# Patient Record
Sex: FEMALE | Race: WHITE | NOT HISPANIC OR LATINO | Employment: UNEMPLOYED | URBAN - METROPOLITAN AREA
[De-identification: names, ages, dates, MRNs, and addresses within clinical notes are randomized per-mention and may not be internally consistent; named-entity substitution may affect disease eponyms.]

---

## 2017-07-12 ENCOUNTER — TELEPHONE (OUTPATIENT)
Dept: FAMILY MEDICINE | Facility: CLINIC | Age: 42
End: 2017-07-12

## 2017-07-12 NOTE — LETTER
July 12, 2017      Elly Ballard  [address] [place] [address]    Dear Ballard,      This letter is to remind you that you are due for your PAP smear.    Please call [phone] to schedule your appointment at your earliest convenience.     If you have completed the tests outside of [facility], please have the results forwarded to our office so we can update our records. You could also send us this info via EasyLink, we typically need the month and year it was done, clinic name and result.     Sincerely,      Care team for HERMINIO Bhatt CNP

## 2017-07-12 NOTE — TELEPHONE ENCOUNTER
Panel Management Review      Patient has the following on her problem list: None      Composite cancer screening  Chart review shows that this patient is due/due soon for the following Pap Smear  Summary:    Patient is due/failing the following:   PAP    Action needed:   Patient needs office visit for pap.    Type of outreach:    Sent letter.    Questions for provider review:    None                                                                                                                                    [facility]         Chart routed to none.

## 2017-08-05 ENCOUNTER — HEALTH MAINTENANCE LETTER (OUTPATIENT)
Age: 42
End: 2017-08-05

## 2018-04-19 ENCOUNTER — OFFICE VISIT (OUTPATIENT)
Dept: FAMILY MEDICINE | Facility: CLINIC | Age: 43
End: 2018-04-19
Payer: COMMERCIAL

## 2018-04-19 VITALS
HEART RATE: 79 BPM | BODY MASS INDEX: 19.62 KG/M2 | SYSTOLIC BLOOD PRESSURE: 115 MMHG | TEMPERATURE: 97.7 F | HEIGHT: 67 IN | WEIGHT: 125 LBS | OXYGEN SATURATION: 99 % | DIASTOLIC BLOOD PRESSURE: 74 MMHG

## 2018-04-19 DIAGNOSIS — Z13.220 LIPID SCREENING: ICD-10-CM

## 2018-04-19 DIAGNOSIS — E55.9 VITAMIN D DEFICIENCY: ICD-10-CM

## 2018-04-19 DIAGNOSIS — F43.9 SITUATIONAL STRESS: Primary | ICD-10-CM

## 2018-04-19 LAB
CHOLEST SERPL-MCNC: 160 MG/DL
ERYTHROCYTE [DISTWIDTH] IN BLOOD BY AUTOMATED COUNT: 12.4 % (ref 10–15)
HCT VFR BLD AUTO: 39.1 % (ref 35–47)
HDLC SERPL-MCNC: 60 MG/DL
HGB BLD-MCNC: 13.1 G/DL (ref 11.7–15.7)
IRON SATN MFR SERPL: 28 % (ref 15–46)
IRON SERPL-MCNC: 87 UG/DL (ref 35–180)
LDLC SERPL CALC-MCNC: 88 MG/DL
MCH RBC QN AUTO: 30.5 PG (ref 26.5–33)
MCHC RBC AUTO-ENTMCNC: 33.5 G/DL (ref 31.5–36.5)
MCV RBC AUTO: 91 FL (ref 78–100)
NONHDLC SERPL-MCNC: 100 MG/DL
PLATELET # BLD AUTO: 206 10E9/L (ref 150–450)
RBC # BLD AUTO: 4.3 10E12/L (ref 3.8–5.2)
TIBC SERPL-MCNC: 315 UG/DL (ref 240–430)
TRIGL SERPL-MCNC: 61 MG/DL
TSH SERPL DL<=0.005 MIU/L-ACNC: 3.38 MU/L (ref 0.4–4)
WBC # BLD AUTO: 9.3 10E9/L (ref 4–11)

## 2018-04-19 PROCEDURE — 83540 ASSAY OF IRON: CPT | Performed by: FAMILY MEDICINE

## 2018-04-19 PROCEDURE — 84443 ASSAY THYROID STIM HORMONE: CPT | Performed by: FAMILY MEDICINE

## 2018-04-19 PROCEDURE — 82306 VITAMIN D 25 HYDROXY: CPT | Performed by: FAMILY MEDICINE

## 2018-04-19 PROCEDURE — 85027 COMPLETE CBC AUTOMATED: CPT | Performed by: FAMILY MEDICINE

## 2018-04-19 PROCEDURE — 99213 OFFICE O/P EST LOW 20 MIN: CPT | Performed by: FAMILY MEDICINE

## 2018-04-19 PROCEDURE — 80061 LIPID PANEL: CPT | Performed by: FAMILY MEDICINE

## 2018-04-19 PROCEDURE — 36415 COLL VENOUS BLD VENIPUNCTURE: CPT | Performed by: FAMILY MEDICINE

## 2018-04-19 PROCEDURE — 83550 IRON BINDING TEST: CPT | Performed by: FAMILY MEDICINE

## 2018-04-19 NOTE — PROGRESS NOTES
HPI      ROS      Physical Exam      SUBJECTIVE:   Elly Ballard is a 42 year old female who presents to clinic today for the following health issues:    Abnormal Mood Symptoms      Duration: x2-3 weeks     Description:  trouble focusing and sleeping   Depression: YES  Anxiety: YES  Panic attacks: no      Accompanying signs and symptoms: see PHQ-9 and JORDON scores    Therapies tried and outcome: none    Additional: pt will like to get some blood work done: Vitamin D and iron.  Reports a h/o anemia many years ago.  Does eat meat.  No dizziness, severe fatigue or palpitations.      Elly discusses ongoing challenges at home with her two daughters, one of which has high-functioning autism and the other of which is dealing with bullying, changing schools and ptsd.  Elly has been struggling with her attention at work, feeling stressed and sleeping.  She denies panic attacks, PTSD symptoms, significant depression or any SI/HI.  In the past she has had some mild mood symptoms that she has managed with lifestyle.  She feels that if she can get back into running, that will help a lot.  In the meantime, she is interested in pursuing therapy.         Problem list and histories reviewed & adjusted, as indicated.  Additional history: as documented    There is no problem list on file for this patient.    History reviewed. No pertinent surgical history.    Social History   Substance Use Topics     Smoking status: Never Smoker     Smokeless tobacco: Never Used     Alcohol use Yes      Comment: 3/week     Family History   Problem Relation Age of Onset     Pulmonary Embolism Mother      Depression Mother      Anxiety Disorder Mother      Hypertension Mother          No current outpatient prescriptions on file.     No Known Allergies    Reviewed and updated as needed this visit by clinical staff  Tobacco  Allergies  Meds  Med Hx  Surg Hx  Fam Hx  Soc Hx      Reviewed and updated as needed this visit by Provider      "    ROS:  Constitutional, HEENT, cardiovascular, pulmonary, gi and gu systems are negative, except as otherwise noted.    OBJECTIVE:     /74 (BP Location: Right arm, Patient Position: Sitting, Cuff Size: Adult Regular)  Pulse 79  Temp 97.7  F (36.5  C) (Tympanic)  Ht 5' 7\" (1.702 m)  Wt 125 lb (56.7 kg)  LMP 04/19/2018 (Exact Date)  SpO2 99%  BMI 19.58 kg/m2  Body mass index is 19.58 kg/(m^2).  GENERAL APPEARANCE: healthy, alert and no distress  EYES: Eyes grossly normal to inspection, PERRL and conjunctivae and sclerae normal  NECK: no adenopathy, no asymmetry, masses, or scars and thyroid normal to palpation  RESP: lungs clear to auscultation - no rales, rhonchi or wheezes  CV: regular rates and rhythm, normal S1 S2, no S3 or S4 and no murmur, click or rub  PSYCH: mentation appears normal and affect flat, mood is anxious, normal eye contact, speech with normal rate and rhythm though sparse.          ASSESSMENT/PLAN:             1. Situational stress  Agree with utilizing exercise, good diet and regular sleep patterns to help manage stress, referral done for therapy, f/u if needed or if wanting to start a medication; at this point I do not think medication is completely necessary.  - MENTAL HEALTH REFERRAL  - Adult; Outpatient Treatment; Individual/Couples/Family/Group Therapy/Health Psychology; [facility]: [facility] [phone]; We will contact you to schedule the appointment or please call with any questions  - CBC with platelets  - TSH with free T4 reflex  - Iron and iron binding capacity--this is more for her h/o anemia and her request that I check this; she appears to be asymptomatic.     2. Vitamin D deficiency    - Vitamin D Deficiency    3. Lipid screening    - Lipid Profile        Quyen Faye MD  [facility]    "

## 2018-04-19 NOTE — MR AVS SNAPSHOT
After Visit Summary   4/19/2018    Elly Ballard    MRN: [id number]           Patient Information     Date Of Birth          1975        Visit Information        Provider Department      4/19/2018 10:00 AM Quyen Faye MD [facility]        Today's Diagnoses     Situational stress    -  1    Vitamin D deficiency        Lipid screening           Follow-ups after your visit        Additional Services     MENTAL HEALTH REFERRAL  - Adult; Outpatient Treatment; Individual/Couples/Family/Group Therapy/Health Psychology; G: [facility] [phone]; We will contact you to schedule the appointment or please call with any questions       All scheduling is subject to the client's specific insurance plan & benefits, provider/location availability, and provider clinical specialities.  Please arrive 15 minutes early for your first appointment and bring your completed paperwork.    Please be aware that coverage of these services is subject to the terms and limitations of your health insurance plan.  Call member services at your health plan with any benefit or coverage questions.                            Your next 10 appointments already scheduled     May 04, 2018  9:00 AM CDT   PHYSICAL with Quyen Faye MD   [facility]    [address] [place] [address] [phone]              Who to contact     If you have questions or need follow up information about today's clinic visit or your schedule please contact [facility] directly at [phone].  Normal or non-critical lab and imaging results will be communicated to you by MyChart, letter or phone within 4 business days after the clinic has received the results. If you do not hear from us within 7 days, please contact the clinic through MyChart or phone. If you have a critical or abnormal lab result, we will notify you by  "phone as soon as possible.  Submit refill requests through [employer] or call your pharmacy and they will forward the refill request to us. Please allow 3 business days for your refill to be completed.          Additional Information About Your Visit        [employer] Information     [employer] gives you secure access to your electronic health record. If you see a primary care provider, you can also send messages to your care team and make appointments. If you have questions, please call your primary care clinic.  If you do not have a primary care provider, please call [phone] and they will assist you.        Care EveryWhere ID     This is your Care EveryWhere ID. This could be used by other organizations to access your [facility] medical records  [id number]        Your Vitals Were     Pulse Temperature Height Last Period Pulse Oximetry BMI (Body Mass Index)    79 97.7  F (36.5  C) (Tympanic) 5' 7\" (1.702 m) 04/19/2018 (Exact Date) 99% 19.58 kg/m2       Blood Pressure from Last 3 Encounters:   04/19/18 115/74   12/20/16 122/72   12/11/16 113/67    Weight from Last 3 Encounters:   04/19/18 125 lb (56.7 kg)   12/20/16 124 lb (56.2 kg)   12/11/16 125 lb (56.7 kg)              We Performed the Following     CBC with platelets     Iron and iron binding capacity     Lipid Profile     MENTAL HEALTH REFERRAL  - Adult; Outpatient Treatment; Individual/Couples/Family/Group Therapy/Health Psychology; [facility]: [facility] [phone]; We will contact you to schedule the appointment or please call with any questions     TSH with free T4 reflex     Vitamin D Deficiency          Today's Medication Changes          These changes are accurate as of 4/19/18 10:30 AM.  If you have any questions, ask your nurse or doctor.               Stop taking these medicines if you haven't already. Please contact your care team if you have questions.     ibuprofen 200 MG capsule   Stopped by:  Quyen Faye MD           " ondansetron 4 MG tablet   Commonly known as:  ZOFRAN   Stopped by:  Quyen Faye MD           oxyCODONE IR 5 MG tablet   Commonly known as:  ROXICODONE   Stopped by:  Quyen Faye MD           polyethylene glycol Packet   Commonly known as:  MIRALAX/GLYCOLAX   Stopped by:  Quyen Faye MD                    Primary Care Provider Office Phone # Fax #    HERMINIO Bhatt [facility] [phone]       [address] [facility] [address]  [facility] [address]        Equal Access to Services     [facility]: Hadii aad ku hadasho Soomaali, waaxda luqadaha, qaybta kaalmada adeegyada, waxay idiin hayaan aditya hill . So [facility] [phone].    ATENCIÓN: Si habla español, tiene a funk disposición servicios gratuitos de asistencia lingüística. [facility] [phone].    We comply with applicable federal civil rights laws and [place] laws. We do not discriminate on the basis of race, color, national origin, age, disability, sex, sexual orientation, or gender identity.            Thank you!     Thank you for choosing [facility]  for your care. Our goal is always to provide you with excellent care. Hearing back from our patients is one way we can continue to improve our services. Please take a few minutes to complete the written survey that you may receive in the mail after your visit with us. Thank you!             Your Updated Medication List - Protect others around you: Learn how to safely use, store and throw away your medicines at www.disposemymeds.org.      Notice  As of 4/19/2018 10:30 AM    You have not been prescribed any medications.

## 2018-04-20 LAB — DEPRECATED CALCIDIOL+CALCIFEROL SERPL-MC: 23 UG/L (ref 20–75)

## 2018-05-04 ENCOUNTER — OFFICE VISIT (OUTPATIENT)
Dept: FAMILY MEDICINE | Facility: CLINIC | Age: 43
End: 2018-05-04
Payer: COMMERCIAL

## 2018-05-04 VITALS
WEIGHT: 124 LBS | BODY MASS INDEX: 19.42 KG/M2 | DIASTOLIC BLOOD PRESSURE: 64 MMHG | SYSTOLIC BLOOD PRESSURE: 101 MMHG | RESPIRATION RATE: 18 BRPM | HEART RATE: 72 BPM | TEMPERATURE: 97.9 F

## 2018-05-04 DIAGNOSIS — Z00.00 ROUTINE GENERAL MEDICAL EXAMINATION AT A HEALTH CARE FACILITY: Primary | ICD-10-CM

## 2018-05-04 PROCEDURE — 87624 HPV HI-RISK TYP POOLED RSLT: CPT | Performed by: FAMILY MEDICINE

## 2018-05-04 PROCEDURE — G0145 SCR C/V CYTO,THINLAYER,RESCR: HCPCS | Performed by: FAMILY MEDICINE

## 2018-05-04 PROCEDURE — 99396 PREV VISIT EST AGE 40-64: CPT | Performed by: FAMILY MEDICINE

## 2018-05-04 NOTE — PROGRESS NOTES
SUBJECTIVE:   CC: Elly Ballard is an 42 year old woman who presents for preventive health visit.     Healthy Habits:    Do you get at least three servings of calcium containing foods daily (dairy, green leafy vegetables, etc.)? yes    Amount of exercise or daily activities, outside of work: 2-3 day(s) per week    Problems taking medications regularly No    Medication side effects: No    Have you had an eye exam in the past two years? no    Do you see a dentist twice per year? yes    Do you have sleep apnea, excessive snoring or daytime drowsiness?no      CV: her mother had a PE; Elly is not sure why but notes an unhealthy lifestyle.  No early CAD.  Elly plans to get back into running this spring  Malignancy: due for a pap.  No breast or colon cancer in the family  No h/o abnormal pap.  Bone health: no risk factors  Immunizations: up to date  Sexual health: no concerns, but notes pms  Depression screen: I recently saw the patient with regards to some mood changes related to stresses at home.  She has an appt with psychology later this month.           Today's PHQ-2 Score: No flowsheet data found.    Abuse: Current or Past(Physical, Sexual or Emotional)- No  Do you feel safe in your environment - Yes    Social History   Substance Use Topics     Smoking status: Never Smoker     Smokeless tobacco: Never Used     Alcohol use Yes      Comment: 3/week     If you drink alcohol do you typically have >3 drinks per day or >7 drinks per week? No                     Reviewed orders with patient.  Reviewed health maintenance and updated orders accordingly - Yes  There is no problem list on file for this patient.    History reviewed. No pertinent surgical history.    Social History   Substance Use Topics     Smoking status: Never Smoker     Smokeless tobacco: Never Used     Alcohol use Yes      Comment: 3/week     Family History   Problem Relation Age of Onset     Pulmonary Embolism Mother      Depression Mother       Anxiety Disorder Mother      Hypertension Mother          Current Outpatient Prescriptions   Medication Sig Dispense Refill     cholecalciferol (VITAMIN D3) 17910 units capsule Take 1 capsule (50,000 Units) by mouth once a week 8 capsule 0     No Known Allergies    Patient under age 50, mutual decision reflected in health maintenance.      Pertinent mammograms are reviewed under the imaging tab.  History of abnormal Pap smear: NO - age 30-65 PAP every 5 years with negative HPV co-testing recommended    Reviewed and updated as needed this visit by clinical staff  Meds         Reviewed and updated as needed this visit by Provider            ROS:  CONSTITUTIONAL: NEGATIVE for fever, chills, change in weight  INTEGUMENTARU/SKIN: NEGATIVE for worrisome rashes, moles or lesions  EYES: NEGATIVE for vision changes or irritation  ENT: NEGATIVE for ear, mouth and throat problems  RESP: NEGATIVE for significant cough or SOB  BREAST: NEGATIVE for masses, tenderness or discharge  CV: NEGATIVE for chest pain, palpitations or peripheral edema  GI: NEGATIVE for nausea, abdominal pain, heartburn, or change in bowel habits  : NEGATIVE for unusual urinary or vaginal symptoms. Periods are regular.  MUSCULOSKELETAL: NEGATIVE for significant arthralgias or myalgia  NEURO: NEGATIVE for weakness, dizziness or paresthesias  PSYCHIATRIC: NEGATIVE for changes in mood or affect    OBJECTIVE:   LMP 04/19/2018 (Exact Date) /64 (BP Location: Left arm, Patient Position: Sitting, Cuff Size: Adult Regular)  Pulse 72  Temp 97.9  F (36.6  C) (Oral)  Resp 18  Wt 124 lb (56.2 kg)  LMP 04/19/2018 (Exact Date)  BMI 19.42 kg/m2    EXAM:  GENERAL: healthy, alert and no distress  EYES: Eyes grossly normal to inspection, PERRL and conjunctivae and sclerae normal  HENT: ear canals and TM's normal, nose and mouth without ulcers or lesions  NECK: no adenopathy, no asymmetry, masses, or scars and thyroid normal to palpation  RESP: lungs clear to  "auscultation - no rales, rhonchi or wheezes  BREAST: normal without masses, tenderness or nipple discharge and no palpable axillary masses or adenopathy  CV: regular rate and rhythm, normal S1 S2, no S3 or S4, no murmur, click or rub, no peripheral edema and peripheral pulses strong  ABDOMEN: soft, nontender, no hepatosplenomegaly, no masses and bowel sounds normal   (female): normal female external genitalia, normal urethral meatus, vaginal mucosa pink, moist, well rugated, and normal cervix/adnexa/uterus without masses or discharge  MS: no gross musculoskeletal defects noted, no edema  SKIN: no suspicious lesions or rashes  NEURO: Normal strength and tone, mentation intact and speech normal  PSYCH: mentation appears normal, affect flat, occasionally distracted or inattentive.    ASSESSMENT/PLAN:   1. Routine general medical examination at a health care facility  CV: no risk factors  Malignancy: pap/hpv done today.  Reviewed recs regarding first mammo at 50.  Colonoscopy at 50  Bone health: no risk factors  Immunizations: up to date  Sexual health: some pms, discussed as needed ssri and/or hormonal treatment; she will consider.    - Pap imaged thin layer screen with HPV - recommended age 30 - 65  - HPV High Risk Types DNA Cervical    COUNSELING:   Reviewed preventive health counseling, as reflected in patient instructions         reports that she has never smoked. She has never used smokeless tobacco.    Estimated body mass index is 19.58 kg/(m^2) as calculated from the following:    Height as of 4/19/18: 5' 7\" (1.702 m).    Weight as of 4/19/18: 125 lb (56.7 kg).       Counseling Resources:  ATP IV Guidelines  Pooled Cohorts Equation Calculator  Breast Cancer Risk Calculator  FRAX Risk Assessment  ICSI Preventive Guidelines  Dietary Guidelines for Americans, 2010  USDA's MyPlate  ASA Prophylaxis  Lung CA Screening    Quyen Faye MD  [facility]  "

## 2018-05-04 NOTE — MR AVS SNAPSHOT
After Visit Summary   5/4/2018    Elly Ballard    MRN: [id number]           Patient Information     Date Of Birth          1975        Visit Information        Provider Department      5/4/2018 9:00 AM Quyen Faye MD [facility]        Today's Diagnoses     Routine general medical examination at a health care facility    -  1      Care Instructions      Preventive Health Recommendations  Female Ages 40 to 49    Yearly exam:     See your health care provider every year in order to  1. Review health changes.   2. Discuss preventive care.    3. Review your medicines if your doctor prescribed any.      Get a Pap test every three years (unless you have an abnormal result and your provider advises testing more often).      If you get Pap tests with HPV test, you only need to test every 5 years, unless you have an abnormal result. You do not need a Pap test if your uterus was removed (hysterectomy) and you have not had cancer.      You should be tested each year for STDs (sexually transmitted diseases), if you're at risk.       Ask your doctor if you should have a mammogram.      Have a colonoscopy (test for colon cancer) if someone in your family has had colon cancer or polyps before age 50.       Have a cholesterol test every 5 years.       Have a diabetes test (fasting glucose) after age 45. If you are at risk for diabetes, you should have this test every 3 years.    Shots: Get a flu shot each year. Get a tetanus shot every 10 years.     Nutrition:     Eat at least 5 servings of fruits and vegetables each day.    Eat whole-grain bread, whole-wheat pasta and brown rice instead of white grains and rice.    Talk to your provider about Calcium and Vitamin D.     Lifestyle    Exercise at least 150 minutes a week (an average of 30 minutes a day, 5 days a week). This will help you control your weight and prevent disease.    Limit alcohol to one drink per day.    No smoking.      Wear sunscreen to prevent skin cancer.    See your dentist every six months for an exam and cleaning.          Follow-ups after your visit        Your next 10 appointments already scheduled     May 31, 2018  1:00 PM CDT   (Arrive by 12:30 PM)   New Visit with [facility] ([facility]  [address]  [facility] [address]   [phone]            Jun 07, 2018 11:00 AM CDT   Return Visit with [facility] ([facility]  [address] [facility]  [facility] [address]   [phone]              Who to contact     If you have questions or need follow up information about today's clinic visit or your schedule please contact [facility] directly at [phone].  Normal or non-critical lab and imaging results will be communicated to you by MyChart, letter or phone within 4 business days after the clinic has received the results. If you do not hear from us within 7 days, please contact the clinic through [employer] or phone. If you have a critical or abnormal lab result, we will notify you by phone as soon as possible.  Submit refill requests through [employer] or call your pharmacy and they will forward the refill request to us. Please allow 3 business days for your refill to be completed.          Additional Information About Your Visit        MyChart Information     [employer] gives you secure access to your electronic health record. If you see a primary care provider, you can also send messages to your care team and make appointments. If you have questions, please call your primary care clinic.  If you do not have a primary care provider, please call [phone] and they will assist you.        Care EveryWhere ID     This is your Care EveryWhere ID. This could be used by other organizations to access your [facility]  records  [id number]        Your Vitals Were     Pulse Temperature Respirations Last Period BMI (Body Mass Index)       72 97.9  F (36.6  C) (Oral) 18 04/19/2018 (Exact Date) 19.42 kg/m2        Blood Pressure from Last 3 Encounters:   05/04/18 101/64   04/19/18 115/74   12/20/16 122/72    Weight from Last 3 Encounters:   05/04/18 124 lb (56.2 kg)   04/19/18 125 lb (56.7 kg)   12/20/16 124 lb (56.2 kg)              We Performed the Following     HPV High Risk Types DNA Cervical     Pap imaged thin layer screen with HPV - recommended age 30 - 65        Primary Care Provider Office Phone # Fax #    Nika LeroyHERMINIO Rios [facility] [phone]       [address] [facility] [address]  [facility] [address]        Equal Access to Services     [facility] : Hadii aad ku hadasho Sobetsy, waaxda luqadaha, qaybta kaalmada adegildayada, [place] . So [facility] [phone].    ATENCIÓN: Si habla español, tiene a funk disposición servicios gratuitos de asistencia lingüística. Llame al [phone].    We comply with applicable federal civil rights laws and [place] laws. We do not discriminate on the basis of race, color, national origin, age, disability, sex, sexual orientation, or gender identity.            Thank you!     Thank you for choosing [facility]  for your care. Our goal is always to provide you with excellent care. Hearing back from our patients is one way we can continue to improve our services. Please take a few minutes to complete the written survey that you may receive in the mail after your visit with us. Thank you!             Your Updated Medication List - Protect others around you: Learn how to safely use, store and throw away your medicines at www.disposemymeds.org.          This list is accurate as of 5/4/18 11:59 PM.  Always use your most recent med list.                   Brand Name Dispense Instructions for use Diagnosis    cholecalciferol 26431 units capsule     VITAMIN D3    8 capsule    Take 1 capsule (50,000 Units) by mouth once a week    Vitamin D deficiency

## 2018-05-08 LAB
COPATH REPORT: NORMAL
PAP: NORMAL

## 2018-05-10 LAB
FINAL DIAGNOSIS: NORMAL
HPV HR 12 DNA CVX QL NAA+PROBE: NEGATIVE
HPV16 DNA SPEC QL NAA+PROBE: NEGATIVE
HPV18 DNA SPEC QL NAA+PROBE: NEGATIVE
SPECIMEN DESCRIPTION: NORMAL
SPECIMEN SOURCE CVX/VAG CYTO: NORMAL

## 2018-06-07 ENCOUNTER — OFFICE VISIT (OUTPATIENT)
Dept: PSYCHOLOGY | Facility: CLINIC | Age: 43
End: 2018-06-07
Attending: FAMILY MEDICINE
Payer: COMMERCIAL

## 2018-06-07 DIAGNOSIS — F41.1 GENERALIZED ANXIETY DISORDER: Primary | ICD-10-CM

## 2018-06-07 DIAGNOSIS — F33.1 MAJOR DEPRESSIVE DISORDER, RECURRENT, MODERATE (H): ICD-10-CM

## 2018-06-07 PROCEDURE — 90834 PSYTX W PT 45 MINUTES: CPT | Performed by: COUNSELOR

## 2018-06-07 ASSESSMENT — ANXIETY QUESTIONNAIRES
2. NOT BEING ABLE TO STOP OR CONTROL WORRYING: SEVERAL DAYS
1. FEELING NERVOUS, ANXIOUS, OR ON EDGE: SEVERAL DAYS
7. FEELING AFRAID AS IF SOMETHING AWFUL MIGHT HAPPEN: NOT AT ALL
6. BECOMING EASILY ANNOYED OR IRRITABLE: SEVERAL DAYS
3. WORRYING TOO MUCH ABOUT DIFFERENT THINGS: SEVERAL DAYS
GAD7 TOTAL SCORE: 6
IF YOU CHECKED OFF ANY PROBLEMS ON THIS QUESTIONNAIRE, HOW DIFFICULT HAVE THESE PROBLEMS MADE IT FOR YOU TO DO YOUR WORK, TAKE CARE OF THINGS AT HOME, OR GET ALONG WITH OTHER PEOPLE: SOMEWHAT DIFFICULT
5. BEING SO RESTLESS THAT IT IS HARD TO SIT STILL: NOT AT ALL

## 2018-06-07 ASSESSMENT — PATIENT HEALTH QUESTIONNAIRE - PHQ9: 5. POOR APPETITE OR OVEREATING: MORE THAN HALF THE DAYS

## 2018-06-07 NOTE — PROGRESS NOTES
Progress Note - Initial Session    Client Name:  Elly Ballard Date: 6/7/2018         Service Type: Individual      Session Start Time: 11:05a  Session End Time: 11:50a      Session Length: 38 - 52      Session #: 1     Attendees: Client attended alone         Diagnostic Assessment in progress.  Unable to complete documentation at the conclusion of the first session due to addressing mental health symptoms as well as life stressors.      Mental Status Assessment:  Appearance:   Appropriate   Eye Contact:   Fair   Psychomotor Behavior: Normal   Attitude:   Cooperative   Orientation:   All  Speech   Rate / Production: Normal    Volume:  Normal   Mood:    Anxious  Depressed  Sad  Tearful   Affect:    Appropriate  Mood congruent   Thought Content:  Clear   Thought Form:  Coherent  Logical  Circumstantial  Insight:    Fair       Safety Issues and Plan for Safety and Risk Management:  Client denies current fears or concerns for personal safety.  Client denies current or recent suicidal ideation or behaviors.  Client denies current or recent homicidal ideation or behaviors.  Client denies current or recent self injurious behavior or ideation.  Client denies other safety concerns.  A safety and risk management plan has not been developed at this time, however client was given the after-hours number / 911 should there be a change in any of these risk factors.  Client reports there are no firearms in the house.      Diagnostic Criteria:  A. Excessive anxiety and worry about a number of events or activities (such as work or school performance).   B. The person finds it difficult to control the worry.  C. Select 3 or more symptoms (required for diagnosis). Only one item is required in children.   - Restlessness or feeling keyed up or on edge.    - Being easily fatigued.    - Difficulty concentrating or mind going blank.    - Irritability.    - Sleep disturbance (difficulty falling or staying asleep, or  restless unsatisfying sleep).   D. The focus of the anxiety and worry is not confined to features of an Axis I disorder.  E. The anxiety, worry, or physical symptoms cause clinically significant distress or impairment in social, occupational, or other important areas of functioning.   F. The disturbance is not due to the direct physiological effects of a substance (e.g., a drug of abuse, a medication) or a general medical condition (e.g., hyperthyroidism) and does not occur exclusively during a Mood Disorder, a Psychotic Disorder, or a Pervasive Developmental Disorder.  A) Recurrent episode(s) - symptoms have been present during the same 2-week period and represent a change from previous functioning 5 or more symptoms (required for diagnosis)   - Depressed mood. Note: In children and adolescents, can be irritable mood.     - Diminished interest or pleasure in all, or almost all, activities.    - Poor appetite.    - Sleep issues.    - Feelings of worthlessness or inappropriate and excessive guilt.    - Diminished ability to think or concentrate, or indecisiveness.   B) The symptoms cause clinically significant distress or impairment in social, occupational, or other important areas of functioning  C) The episode is not attributable to the physiological effects of a substance or to another medical condition  D) The occurence of major depressive episode is not better explained by other thought / psychotic disorders  E) There has never been a manic episode or hypomanic episode      DSM5 Diagnoses: (Sustained by DSM5 Criteria Listed Above)  Diagnoses: 296.32 (F33.1) Major Depressive Disorder, Recurrent Episode, Moderate & 300.02 (F41.1) Generalized Anxiety Disorder  Psychosocial & Contextual Factors: Limited social support, care giving for daughter with MI issues  WHODAS 2.0 (12 item)            This questionnaire asks about difficulties due to health conditions. Health conditions  include  disease or illnesses, other  health problems that may be short or long lasting,  injuries, mental health or emotional problems, and problems with alcohol or drugs.                     Think back over the past 30 days and answer these questions, thinking about how much  difficulty you had doing the following activities. For each question, please [place] only  one response.    S1 Standing for long periods such as 30 minutes? None =         1   S2 Taking care of household responsibilities? Mild =           2   S3 Learning a new task, for example, learning how to get to a new place? None =         1   S4 How much of a problem do you have joining community activities (for example, festivals, [employer] or other activities) in the same way as anyone else can? Mild =           2   S5 How much have you been emotionally affected by your health problems? Moderate =   3     In the past 30 days, how much difficulty did you have in:   S6 Concentrating on doing something for ten minutes? Moderate =   3   S7 Walking a long distance such as a kilometer (or equivalent)? None =         1   S8 Washing your whole body? None =         1   S9 Getting dressed? None =         1   S10 Dealing with people you do not know? Mild =           2   S11 Maintaining a friendship? Mild =           2   S12 Your day to day work? Mild =           2     H1 Overall, in the past 30 days, how many days were these difficulties present? Record number of days 20   H2 In the past 30 days, for how many days were you totally unable to carry out your usual activities or work because of any health condition? Record number of days 2   H3 In the past 30 days, not counting the days that you were totally unable, for how many days did you cut back or reduce your usual activities or work because of any health condition? Record number of days 13       Collateral Reports Completed:  Routed note to PCP      PLAN: (Homework, other):  Client stated that she may follow up for ongoing services with [facility].  Continue to assess mental health symptoms, life stressors, and complete intake.      Wayne Proctor, [facility]

## 2018-06-07 NOTE — MR AVS SNAPSHOT
MRN:[id number]                      After Visit Summary   6/7/2018    Elly Ballard    MRN: [id number]           Visit Information        Provider Department      6/7/2018 11:00 AM Hitesh [facility] Generic      Your next 10 appointments already scheduled     Jun 13, 2018  4:30 PM CDT   Return Visit with Wayne Proctor [facility]  [address] [facility] [address]  [facility] [id number] [phone]            Jun 26, 2018 11:00 AM CDT   Return Visit with Wayne Proctor [facility] ([facility]  [address]  [facility] [id number] [phone]              MyChart Information     [facility] gives you secure access to your electronic health record. If you see a primary care provider, you can also send messages to your care team and make appointments. If you have questions, please call your primary care clinic.  If you do not have a primary care provider, please call [phone] and they will assist you.        Care EveryWhere ID     This is your Care EveryWhere ID. This could be used by other organizations to access your [place] medical records  [id number]        Equal Access to Services     VIC COTTRELL : Sarah lestero Marya, waaxda luqadaha, qaybta kaalmada adeegyada, marino sow. So [facility] [phone].    ATENCIÓN: Si habla español, tiene a funk disposición servicios gratuitos de asistencia lingüística. Llkan al [phone].    We comply with applicable federal civil rights laws and [place] laws. We do not discriminate on the basis of race, color, national origin, age, disability, sex, sexual orientation, or gender identity.

## 2018-06-07 NOTE — Clinical Note
Elly Carey completed first intake appt for therapy. She currently meets criteria for Major Depressive Disorder, Recurrent Episode, Moderate & Generalized Anxiety Disorder. Please contact me with any questions or concerns.   Thank you, Wayne Proctor MA, [facility]

## 2018-06-08 ASSESSMENT — ANXIETY QUESTIONNAIRES: GAD7 TOTAL SCORE: 6

## 2018-06-08 ASSESSMENT — PATIENT HEALTH QUESTIONNAIRE - PHQ9: SUM OF ALL RESPONSES TO PHQ QUESTIONS 1-9: 8

## 2018-06-13 ENCOUNTER — OFFICE VISIT (OUTPATIENT)
Dept: PSYCHOLOGY | Facility: CLINIC | Age: 43
End: 2018-06-13
Payer: COMMERCIAL

## 2018-06-13 DIAGNOSIS — F41.1 GENERALIZED ANXIETY DISORDER: ICD-10-CM

## 2018-06-13 DIAGNOSIS — F33.1 MAJOR DEPRESSIVE DISORDER, RECURRENT, MODERATE (H): Primary | ICD-10-CM

## 2018-06-13 DIAGNOSIS — F43.9 TRAUMA AND STRESSOR-RELATED DISORDER: ICD-10-CM

## 2018-06-13 PROBLEM — T14.90XA TRAUMA: Status: ACTIVE | Noted: 2018-06-13

## 2018-06-13 PROCEDURE — 90791 PSYCH DIAGNOSTIC EVALUATION: CPT | Performed by: COUNSELOR

## 2018-06-13 NOTE — PROGRESS NOTES
"                                                                                                                                                                      Adult Intake Structured Interview  Standard Diagnostic Assessment      CLIENT'S NAME: Elly Ballard  MRN:   [id number]  :   1975  ACCT. NUMBER: [id number]  DATE OF SERVICE: 18      Identifying Information:  Client is a 42 year old, ,  female. Client was referred for counseling by Dr. Faye at [facility]. Client is currently employed part time. Client attended the session alone.       Client's Statement of Presenting Concern:  Client reports the reason for seeking therapy at this time as \"anxiety attacks, trouble focusing/concentrating\". Reports waking up and feeling shaky and crying, racing heart, difficulty breathing, feeling scared, powerless, \"not being able to act on rational brain\". Also reports having premenstrual dysphoric disorder symptoms - \"super on edge\", irritable, trouble concentrating, tearfulness, difficulty letting go of negative interactions 2 weeks prior to getting period. Reports vicarious trauma from 17 yo's life experiences as well (daughter has complex trauma from experiences of bullying, sensory processing issues - difficulties with gait and feeling comfortable in clothes - and food intolerance). Client stated that her symptoms have resulted in the following functional impairments: childcare / parenting, management of the household and or completion of tasks, organization, relationship(s), self-care, social interactions and work / vocational responsibilities.      History of Presenting Concern:  Client reports that these problem(s) began end of May after learning about daughter's bf's trauma history, but client also acknowledged ongoing stress re: daughter's mental health issues for the past 9-10 years. Client has attempted to resolve these concerns in the past " "through trying to increase self-care, some exercising, running. Client reports that other professional(s) are not involved in providing support / services.       Social History:  Client reported she grew up in [place], [place] - attended [employer] for college then decided to stay in [place]. They were the 7 born of 7 children. This is an intact family and parents remain  until mother passes away about 13 years ago from pulmonary embolism. Client reported that her childhood was \"good overall, but I had some invisible special needs not understood at the time\". Recalls being a high achiever, ruler follower, introvert, and bored a lot due to parents dismissing her interests/activities. Mother described her to be \"easy to raise/care for\". States family is conflict and emotionally avoidant and states she is more direct than family. Client described her current relationships with family of origin as supportive overall. States mother struggled with depression which caused anxiety and insecurity for her and father was and continues to be emotionally distant. Reports things are \"very good overall\" with spouse and children although there is less time/energy for younger daughter due to needs of older daughter.     Client reported a history of 1 committed relationships. Client has been  for 20 years. Client reported having 2 children - both described to be on the autism spectrum. Client identified few stable and meaningful social connections. Client reported that she has not been involved with the legal system. Client's highest education level was college graduate. Client did not identify any learning problems. There are no ethnic, cultural or Taoist factors that may be relevant for therapy. Client identified her preferred language to be English. Client reported she does not need the assistance of an  or other support involved in therapy. Modifications will not be used to assist communication in therapy. " Client did not serve in the .     Client reports family history includes Anxiety Disorder in her mother; Depression in her mother; Hypertension in her mother; Pulmonary Embolism in her mother.      Mental Health History:  Client reported the following biological family members or relatives with mental health issues: Mother experienced Bipolar Disorder and Depression, Brothers experienced Depression and Sisters experienced Depression.  Client exhibited symptoms of Anxiety and Depression but had not been formally diagnosed.  Client has not received mental health services in the past.  Hospitalizations: None.  Client is not currently receiving any mental health services.      Chemical Health History:  Client reported the following biological family members or relatives with chemical health issues: Brothers reportedly used alcohol, Mother reportedly used alcohol, Sisters reportedly used alcohol . Client has not received chemical dependency treatment in the past. Client is not currently receiving any chemical dependency treatment. Client reports no problems as a result of their drinking / drug use.      Client Reports:  Client reports using alcohol 1-2 times per day and has 1-2 drinks at a time. Client first started drinking at age 20.  Client denies using tobacco.  Client denies using marijuana.  Client reports using caffeine 2 times per day and drinks 1 at a time. Client started using caffeine at age 22.  Client denies using street drugs.  Client denies the non-medical use of prescription or over the counter drugs.    CAGE: None of the patient's responses to the CAGE screening were positive / Negative CAGE score   Based on the negative Cage-Aid score and clinical interview there are not indications of drug or alcohol abuse.    Discussed the general effects of drugs and alcohol on health and well-being. Therapist gave client printed information about the effects of chemical use on her health and well  being.      Significant Losses / Trauma / Abuse / Neglect Issues:  There are indications or report of significant loss, trauma, abuse or neglect issues related to: mother's sudden death 2014, children having invisible special needs, witnessing their constant experiences of discrimination, bullying, and psychological distress, experiencing backlash for advocating for her daughter by school administrators and other parents, Worship politics conflict co occurring during daughter's school stress.     Issues of possible neglect are not present.      Medical Issues:  Client has had a physical exam to rule out medical causes for current symptoms. Date of last physical exam was within the past year. Symptoms have developed since last physical exam and client was encouraged to follow up with PCP.  . The client has a [place] Primary Care Provider, who is named Quyen Faye. The client reports not having a psychiatrist. Client reports no current medical concerns. The client denies the presence of chronic or episodic pain. There are not significant nutritional concerns.     Client reports current meds as:   Current Outpatient Prescriptions   Medication Sig     cholecalciferol (VITAMIN D3) 85655 units capsule Take 1 capsule (50,000 Units) by mouth once a week     No current facility-administered medications for this visit.        Client Allergies:  No Known Allergies      Medical History:  No past medical history on file.      Medication Adherence:  N/A - Client does not have prescribed psychiatric medications.    Client was provided recommendation to follow-up with prescribing physician.      Mental Status Assessment:  Appearance:   Appropriate   Eye Contact:   Good   Psychomotor Behavior: Normal   Attitude:   Cooperative   Orientation:   All  Speech   Rate / Production: Normal    Volume:  Soft   Mood:    Anxious  Depressed  Sad Tearful  Affect:    Appropriate  Worrisome   Thought Content:  Clear  Rumination   Thought  Form:  Coherent  Logical  Circumstantial  Insight:    Fair       Review of Symptoms:  Depression: Sleep Interest Guilt Concentration Appetite Hopeless Helpless Worthless Ruminations Irritability  Hilaria:  No symptoms  Psychosis: No symptoms  Anxiety: Worries Nervousness  Panic:  Palpitations Tremors Shortness of Breath  Post Traumatic Stress Disorder: Re-experiencing of Trauma Avoid Traumatic Stimuli Increased Arousal Impaired Function Trauma  Obsessive Compulsive Disorder: No symptoms  Eating Disorder: No symptoms  Oppositional Defiant Disorder: No symptoms  ADD / ADHD: No symptoms  Conduct Disorder: No symptoms      Safety Assessment:    History of Safety Concerns:   Client denied a history of suicidal ideation.    Client denied a history of suicide attempts.    Client denied a history of homicidal ideation.    Client denied a history of self-injurious ideation and behaviors.    Client denied a history of personal safety concerns.    Client denied a history of assaultive behaviors.        Current Safety Concerns:  Client denies current suicidal ideation.    Client denies current homicidal ideation and behaviors.  Client denies current self-injurious ideation and behaviors.    Client denies current concerns for personal safety.      Client reports there are no firearms in the house.       Plan for Safety and Risk Management:  A safety and risk management plan has not been developed at this time, however client was given the after-hours number / 911 should there be a change in any of these risk factors.      Client's Strengths and Limitations:  Client identified the following strengths or resources that will help her succeed in counseling: Mormonism, family support, intelligence and self-aware. Client identified the following supports: family, Restorationist / spirituality and sisters. Things that may interfere with the client's success in counseling include: lack of social support, regular crisies with daughter, choppy work  schedule.      Diagnostic Criteria:  A. Excessive anxiety and worry about a number of events or activities (such as work or school performance).   B. The person finds it difficult to control the worry.  C. Select 3 or more symptoms (required for diagnosis). Only one item is required in children.   - Restlessness or feeling keyed up or on edge.    - Being easily fatigued.    - Difficulty concentrating or mind going blank.    - Irritability.    - Sleep disturbance (difficulty falling or staying asleep, or restless unsatisfying sleep).   D. The focus of the anxiety and worry is not confined to features of an Axis I disorder.  E. The anxiety, worry, or physical symptoms cause clinically significant distress or impairment in social, occupational, or other important areas of functioning.   F. The disturbance is not due to the direct physiological effects of a substance (e.g., a drug of abuse, a medication) or a general medical condition (e.g., hyperthyroidism) and does not occur exclusively during a Mood Disorder, a Psychotic Disorder, or a Pervasive Developmental Disorder.  A) Recurrent episode(s) - symptoms have been present during the same 2-week period and represent a change from previous functioning 5 or more symptoms (required for diagnosis)   - Depressed mood. Note: In children and adolescents, can be irritable mood.     - Diminished interest or pleasure in all, or almost all, activities.    - Poor appetite.    - Sleep issues.    - Feelings of worthlessness or inappropriate and excessive guilt.    - Diminished ability to think or concentrate, or indecisiveness.   B) The symptoms cause clinically significant distress or impairment in social, occupational, or other important areas of functioning  C) The episode is not attributable to the physiological effects of a substance or to another medical condition  D) The occurence of major depressive episode is not better explained by other thought / psychotic disorders  E)  There has never been a manic episode or hypomanic episode  A. The person has been exposed to a traumatic event in which both of the following were present:     (1) the person experienced, witnessed, or was confronted with an event or events that involved actual or threatened death or serious injury, or a threat to the physical integrity of self or others  B. The traumatic event is persistently reexperienced in one (or more) of the following ways:     - Recurrent and intrusive distressing recollections of the event, including images, thoughts, or perceptions. Note: In young children, repetitive play may occur in which themes or aspects of the trauma are expressed.      - Recurrent distressing dreams of the event. Note: In children, there may be frightening dreams without recognizable content.      - Physiological reactivity on exposure to internal or external cues that symbolize or resemble an aspect of the traumatic event.   C. Persistent avoidance of stimuli associated with the trauma and numbing of general responsiveness (not present before the trauma), as indicated by three (or more) of the following:     - Efforts to avoid thoughts, feelings, or conversations associated with the trauma.      - Markedly diminished interest or participation in significant activities.   D. Persistent symptoms of increased arousal (not present before the trauma), as indicated by two (or more) of the following:     - Difficulty falling or staying asleep.      - Irritability or outbursts of anger.      - Difficulty concentrating.      - Hypervigilance.   E. Duration of the disturbance is more than 1 month.  F. The disturbance causes clinically significant distress or impairment in social, occupational, or other important areas of functioning.      Functional Status:  Client's symptoms have caused reduced functional status in the following areas: Activities of Daily Living, Occupational / Vocational, Social / Relational.      DSM5  Diagnoses: (Sustained by DSM5 Criteria Listed Above)  Diagnoses: PROVISIONAL 296.32 (F33.1) Major Depressive Disorder, Recurrent Episode, Moderate, 300.02 (F41.1) Generalized Anxiety Disorder, Unspecified Trauma/Stress; RULE OUT Posttraumatic Stress Disorder  Psychosocial & Contextual Factors: Limited social support, care giving for daughter with MI issues, both daughters are on the spectrum, some family stress  WHODAS 2.0 (12 item)                          This questionnaire asks about difficulties due to health conditions. Health conditions                   include                        disease or illnesses, other health problems that may be short or long lasting,                    injuries, mental health or emotional problems, and problems with alcohol or drugs.                              Think back over the past 30 days and answer these questions, thinking about how much              difficulty you had doing the following activities. For each question, please Andreafski only                   one response.     S1 Standing for long periods such as 30 minutes? None =         1   S2 Taking care of household responsibilities? Mild =           2   S3 Learning a new task, for example, learning how to get to a new place? None =         1   S4 How much of a problem do you have joining community activities (for example, festivals, Faith or other activities) in the same way as anyone else can? Mild =           2   S5 How much have you been emotionally affected by your health problems? Moderate =   3      In the past 30 days, how much difficulty did you have in:   S6 Concentrating on doing something for ten minutes? Moderate =   3   S7 Walking a long distance such as a kilometer (or equivalent)? None =         1   S8 Washing your whole body? None =         1   S9 Getting dressed? None =         1   S10 Dealing with people you do not know? Mild =           2   S11 Maintaining a friendship? Mild =           2   S12 Your  day to day work? Mild =           2      H1 Overall, in the past 30 days, how many days were these difficulties present? Record number of days 20   H2 In the past 30 days, for how many days were you totally unable to carry out your usual activities or work because of any health condition? Record number of days 2   H3 In the past 30 days, not counting the days that you were totally unable, for how many days did you cut back or reduce your usual activities or work because of any health condition? Record number of days 13         Attendance Agreement:  Client has signed Attendance Agreement:Yes      Collaboration:  Collaboration with other professionals is not indicated at this time.        Preliminary Treatment Plan:  The client reports no currently identified Evangelical, ethnic or cultural issues relevant to therapy.     services are not indicated.    Modifications to assist communication are not indicated.    The concerns identified by the client will be addressed in therapy.    Initial Treatment will focus on: Depressed Mood, Anxiety, and Trauma.    As a preliminary treatment goal, client will experience a reduction in depressed mood, will develop more effective coping skills to manage depressive symptoms, will develop healthy cognitive patterns and beliefs and will increase ability to function adaptively and will experience a reduction in anxiety, will develop more effective coping skills to manage anxiety symptoms, will develop healthy cognitive patterns and beliefs and will increase ability to function adaptively.    The focus of initial interventions will be to alleviate anxiety, alleviate depressed mood, alleviate lability of mood, facilitate appropriate expression of feelings, increase ability to function adaptively, increase coping skills, process traumas, provide homework to reinforce skill development, teach CBT skills, teach communication skills, teach conflict management skills, teach DBT  skills, teach distress tolerance skills, teach emotional regulation, teach mindfulness skills, teach problem-solving skills, teach relaxation strategies, teach sleep hygiene, teach social skills and teach stress mangement techniques.    Referral to another professional/service is not indicated at this time.    A Release of Information is not needed at this time.    Report to child / adult protection services was NA.    Client will have access to their [facility]' medical record.    TRACY Fritz  June 13, 2018

## 2018-06-13 NOTE — Clinical Note
Hi Elly Sibley completed intake appt for therapy. We will be working on depression, anxiety, and trauma. Please contact me with any questions or concerns.   Thank you, Wayne Proctor MA, [facility]

## 2018-06-26 ENCOUNTER — OFFICE VISIT (OUTPATIENT)
Dept: PSYCHOLOGY | Facility: CLINIC | Age: 43
End: 2018-06-26
Payer: COMMERCIAL

## 2018-06-26 DIAGNOSIS — F41.1 GENERALIZED ANXIETY DISORDER: ICD-10-CM

## 2018-06-26 DIAGNOSIS — F43.9 TRAUMA AND STRESSOR-RELATED DISORDER: ICD-10-CM

## 2018-06-26 DIAGNOSIS — F33.1 MAJOR DEPRESSIVE DISORDER, RECURRENT, MODERATE (H): Primary | ICD-10-CM

## 2018-06-26 PROCEDURE — 90834 PSYTX W PT 45 MINUTES: CPT | Performed by: COUNSELOR

## 2018-06-26 ASSESSMENT — ANXIETY QUESTIONNAIRES
6. BECOMING EASILY ANNOYED OR IRRITABLE: SEVERAL DAYS
IF YOU CHECKED OFF ANY PROBLEMS ON THIS QUESTIONNAIRE, HOW DIFFICULT HAVE THESE PROBLEMS MADE IT FOR YOU TO DO YOUR WORK, TAKE CARE OF THINGS AT HOME, OR GET ALONG WITH OTHER PEOPLE: SOMEWHAT DIFFICULT
1. FEELING NERVOUS, ANXIOUS, OR ON EDGE: SEVERAL DAYS
2. NOT BEING ABLE TO STOP OR CONTROL WORRYING: MORE THAN HALF THE DAYS
7. FEELING AFRAID AS IF SOMETHING AWFUL MIGHT HAPPEN: SEVERAL DAYS
3. WORRYING TOO MUCH ABOUT DIFFERENT THINGS: SEVERAL DAYS
5. BEING SO RESTLESS THAT IT IS HARD TO SIT STILL: SEVERAL DAYS
GAD7 TOTAL SCORE: 9

## 2018-06-26 ASSESSMENT — PATIENT HEALTH QUESTIONNAIRE - PHQ9: 5. POOR APPETITE OR OVEREATING: MORE THAN HALF THE DAYS

## 2018-06-26 NOTE — PROGRESS NOTES
"                                           Progress Note    Client Name: Elly Ballard  Date: 6/25/2018         Service Type: Individual      Session Start Time: 11:05a  Session End Time: 11:50a      Session Length: 45 minutes     Session #: 3     Attendees: Client attended alone    Treatment Plan Last Reviewed: In progress  PHQ-9 / JORDON-7 : 5 & 9       DATA      Progress Since Last Session (Related to Symptoms / Goals / Homework):   Symptoms: Stable, see Epic for PHQ 9 and JORDON 7 updates    Homework: Client will notice and attend to emotion intensity and engage in an action step to alleviate emotion intensity (e.g., feeling upset towards [employer] staff for not following through with daughter's dietary needs vs preparing premade/nonparishable meals for daughter to bring with her).      Episode of Care Goals: Minimal progress - PREPARATION (Decided to change - considering how); Intervened by negotiating a change plan and determining options / strategies for behavior change, identifying triggers, exploring social supports, and working towards setting a date to begin behavior change     Current / Ongoing Stressors and Concerns:   Ongoing stress re: caring and advocating for daughter; expressed feeling like daughter is targeted in all community settings (school, [employer], [employer] group...) and identified feeling hurt, helpless, and hopeless, cleo since adults/parents are not stepping in to support daughter; reported feeling like daughter is not behaving in a way that would make her a target, but acknowledged some oddities about daughter (described daughter to have \"invisble special needs\", but is not formally on the spectrum); acknowledged that she sometimes over worries about daughter and sets high ideals for how her daughter needs should be accommodated; verbalized a need to channel distress and worries into more pragmatic action steps that could improve daughter and her own quality of life.      Treatment " Objective(s) Addressed in This Session:   In progress     Intervention:   CBT: identify self-defeating thoughts and behaviors; challenge and replace with more adaptable thoughts; identify emotions and function of emotions; reinforce here and now living and proactive leisure planning; teach effective/proactive communication/conflict management skills; DBT: teach and reinforce interpersonal effectiveness and boundary setting; teach and reinforce effective communication and self care, teach self-validation skills, teach perspective taking for interpersonal effectiveness; MI: point out discrepancies; evoke change talk, challenge sustain talk, and gauge confidence for change; build on client's self-efficacy        ASSESSMENT: Current Emotional / Mental Status (status of significant symptoms):   Risk status (Self / Other harm or suicidal ideation)   Client denies current fears or concerns for personal safety.   Client denies current or recent suicidal ideation or behaviors.   Client denies current or recent homicidal ideation or behaviors.   Client denies current or recent self injurious behavior or ideation.   Client denies other safety concerns.   A safety and risk management plan has not been developed at this time, however client was given the after-hours number / 911 should there be a change in any of these risk factors.     Appearance:   Appropriate    Eye Contact:   Good    Psychomotor Behavior: Normal    Attitude:   Cooperative    Orientation:   All   Speech    Rate / Production: Normal     Volume:  Normal    Mood:    Depressed  Sad  Tearful    Affect:    Appropriate  Worrisome    Thought Content:  Clear  Rumination    Thought Form:  Coherent  Logical  Circumstantial   Insight:    Good      Medication Review:   No current psychiatric medications prescribed     Medication Compliance:   NA     Changes in Health Issues:   None reported     Chemical Use Review:   Substance Use: Chemical use reviewed, no active  concerns identified      Tobacco Use: No current tobacco use     Collateral Reports Completed:   Not Applicable      PLAN: (Client Tasks / Therapist Tasks / Other)  Start addressing issues identified on treatment plan.        Wayne Proctor, [facility]                                                         ________________________________________________________________________    Treatment Plan    Client's Name: Elly Ballard  YOB: 1975    Date: 6/26/2018    Diagnoses: PROVISIONAL 296.32 (F33.1) Major Depressive Disorder, Recurrent Episode, Moderate, 300.02 (F41.1) Generalized Anxiety Disorder, Unspecified Trauma/Stress; RULE OUT Posttraumatic Stress Disorder  Psychosocial & Contextual Factors: Limited social support, care giving for daughter with MI issues, both daughters are on the spectrum, some family stress  WHODAS: 21    Referral / Collaboration:  Referral to another professional/service is not indicated at this time.    Anticipated number of session or this episode of care: 12      MeasurableTreatment Goal(s) related to diagnosis / functional impairment(s)  Goal 1: Client will improved mood and trauma/stress related symptoms as evidenced by decreased score on PHQ 9 from 8 to 0.    I will know I've met my goal when I am more happy and can form positive memories.      Objective #A (Client Action)    Client will practice emotion regulation skills at least 2x week.  Status: New - Date: 6/26/2018     Intervention(s)  Therapist will assign homework of skill practice; provide educational materials on emotion regulation; role-play effective communication skills; teach emotional recognition/identification.    Objective #B  Client will maintain healthy lifestyle behaviors at least 1x week (exercise/physical activity).  Status: New - Date: 6/26/2018     Intervention(s)  Therapist will assign homework of routine development; role-play conflict management; teach the client how to perform a behavioral chain  "analysis.    Goal 2: Client will better manage anxiety and trauma/stress related symptoms as evidenced by decreased score on JORDON from 6 to 0.    I will know I've met my goal when I feel like I'm not carrying unprocessed stressors.      Objective #A (Client Action)    Client will learn 3 new anxiety management skills.  Status: New - Date: 6/26/2018     Intervention(s)  Therapist will assign homework of skill practice; provide educational materials on anxiety management/relaxation exercises; role-play skills; teach distraction skills.    Objective #B  Client will prioritize her \"own agenda\" (e.g., self care, personal/professional goals) by rejoining book club, completing 1 piece of writing, and scheduling one gathering with girlfriends by the end of this episode of care.    Status: New - Date: 6/26/2018      Intervention(s)  Therapist will assign homework healthy leisure/self-care planning; provide educational materials on what is self care; role-play conflict management; teach about healthy boundaries.      Client has reviewed and agreed to the above plan.      TRACY Fritz  June 26, 2018    "

## 2018-06-26 NOTE — MR AVS SNAPSHOT
MRN:[id number]                      After Visit Summary   6/26/2018    Elly Ballard    MRN: [id number]           Visit Information        Provider Department      6/26/2018 11:00 AM Wayne Proctor [facility]      Your next 10 appointments already scheduled     Jul 17, 2018  2:30 PM CDT   Return Visit with [place] Proctor [facility] ([facility]  [address] [facility]  [facility] [address]   [phone]            Jul 31, 2018 10:00 AM CDT   Return Visit with [place] Hitesh [facility] ([facility]  [address] [place], [place] [address]  [facility] [address]   [phone]            Aug 15, 2018 11:00 AM CDT   Return Visit with Wayne Burnhamong [facility] ([facility]  [address] [place], [place] [address]  [facility] [address]   [phone]              MyChart Information     [employer] gives you secure access to your electronic health record. If you see a primary care provider, you can also send messages to your care team and make appointments. If you have questions, please call your primary care clinic.  If you do not have a primary care provider, please call [phone] and they will assist you.        Care EveryWhere ID     This is your Care EveryWhere ID. This could be used by other organizations to access your [facility] medical records  [id number]        Equal Access to Services     VIC COTTRELL : Hadii wild lestero Soteresaali, waaxda luqadaha, qaybta kaalmada adeegyada, waxay maría elena sow. So [facility] [phone].    ATENCIÓN: Si habla español, tiene a funk disposición servicios gratuitos de asistencia lingüística. Llame al [phone].    We comply with applicable federal civil rights laws and [place] laws. We do not discriminate on the basis of race,  color, national origin, age, disability, sex, sexual orientation, or gender identity.

## 2018-06-27 ASSESSMENT — PATIENT HEALTH QUESTIONNAIRE - PHQ9: SUM OF ALL RESPONSES TO PHQ QUESTIONS 1-9: 5

## 2018-06-27 ASSESSMENT — ANXIETY QUESTIONNAIRES: GAD7 TOTAL SCORE: 9

## 2018-07-17 ENCOUNTER — OFFICE VISIT (OUTPATIENT)
Dept: PSYCHOLOGY | Facility: CLINIC | Age: 43
End: 2018-07-17
Payer: COMMERCIAL

## 2018-07-17 DIAGNOSIS — F33.1 MAJOR DEPRESSIVE DISORDER, RECURRENT, MODERATE (H): Primary | ICD-10-CM

## 2018-07-17 DIAGNOSIS — F43.9 TRAUMA AND STRESSOR-RELATED DISORDER: ICD-10-CM

## 2018-07-17 DIAGNOSIS — F41.1 GENERALIZED ANXIETY DISORDER: ICD-10-CM

## 2018-07-17 PROCEDURE — 90834 PSYTX W PT 45 MINUTES: CPT | Performed by: COUNSELOR

## 2018-07-17 ASSESSMENT — ANXIETY QUESTIONNAIRES
5. BEING SO RESTLESS THAT IT IS HARD TO SIT STILL: SEVERAL DAYS
1. FEELING NERVOUS, ANXIOUS, OR ON EDGE: SEVERAL DAYS
3. WORRYING TOO MUCH ABOUT DIFFERENT THINGS: SEVERAL DAYS
2. NOT BEING ABLE TO STOP OR CONTROL WORRYING: SEVERAL DAYS
6. BECOMING EASILY ANNOYED OR IRRITABLE: SEVERAL DAYS
7. FEELING AFRAID AS IF SOMETHING AWFUL MIGHT HAPPEN: NOT AT ALL
IF YOU CHECKED OFF ANY PROBLEMS ON THIS QUESTIONNAIRE, HOW DIFFICULT HAVE THESE PROBLEMS MADE IT FOR YOU TO DO YOUR WORK, TAKE CARE OF THINGS AT HOME, OR GET ALONG WITH OTHER PEOPLE: SOMEWHAT DIFFICULT
GAD7 TOTAL SCORE: 6

## 2018-07-17 ASSESSMENT — PATIENT HEALTH QUESTIONNAIRE - PHQ9: 5. POOR APPETITE OR OVEREATING: SEVERAL DAYS

## 2018-07-17 NOTE — MR AVS SNAPSHOT
MRN:[id number]                      After Visit Summary   7/17/2018    Elly Ballard    MRN: [id number]           Visit Information        Provider Department      7/17/2018 2:30 PM Wayne Proctor [facility]      Your next 10 appointments already scheduled     Jul 31, 2018 10:00 AM CDT   Return Visit with [place] Proctor [facility] ([facility]  [address] [facility]  [facility] [address]   [phone]            Aug 15, 2018 11:00 AM CDT   Return Visit with [place] Proctor [facility] ([facility]  [address] [facility]  [facility] [address]   [phone]            Aug 28, 2018 10:00 AM CDT   Return Visit with [place] Proctor [facility] ([facility]  [address] [place], [place] [address]  [facility] [address]   [phone]              MyChart Information     [employer] gives you secure access to your electronic health record. If you see a primary care provider, you can also send messages to your care team and make appointments. If you have questions, please call your primary care clinic.  If you do not have a primary care provider, please call [phone] and they will assist you.        Care EveryWhere ID     This is your Care EveryWhere ID. This could be used by other organizations to access your [facility] medical records  [id number]        Equal Access to Services     VIC COTTRELL : Hadii wild jj hadmayao Soteresaali, waaxda luqadaha, qaybta kaalmada adeegyada, waxay maría elena sow. So [facility] [phone].    ATENCIÓN: Si habla español, tiene a funk disposición servicios gratuitos de asistencia lingüística. Llame al [phone].    We comply with applicable federal civil rights laws and [place] laws. We do not discriminate on the basis of race,  color, national origin, age, disability, sex, sexual orientation, or gender identity.

## 2018-07-17 NOTE — PROGRESS NOTES
Progress Note    Client Name: Elly Ballard  Date: 7/17/2018         Service Type: Individual      Session Start Time: 2:35p  Session End Time: 3:15p      Session Length: 40 minutes     Session #: 4     Attendees: Client attended alone    Treatment Plan Last Reviewed: 7/17/2018  PHQ-9 / JORDON-7 : 4 & 6       DATA      Progress Since Last Session (Related to Symptoms / Goals / Homework):   Symptoms: Stable, see Epic for PHQ 9 and JORDON 7 updates    Homework: Partially completed and continued - client will notice and attend to emotion intensity and engage in an action step to alleviate emotion intensity (e.g., feeling upset towards [employer] staff for not following through with daughter's dietary needs vs preparing premade/nonparishable meals for daughter to bring with her). By next appt, client will work to enjoy vacation and identify strategies for decluttering the mind before bed time - to do list.      Episode of Care Goals: Some progress - PREPARATION (Decided to change - considering how); Intervened by negotiating a change plan and determining options / strategies for behavior change, identifying triggers, exploring social supports, and working towards setting a date to begin behavior change     Current / Ongoing Stressors and Concerns:   Reported that daughters are doing well and therefore she seems to feel good these past few weeks; reported learning to dettach emotions from daughters' emotions - at times successful and at other times with difficulty; reported working to find the middle ground for how she supports daughters - over supporting, letting daughters be independent; reported going on vacation next week, but having difficulty having the mindset for trip - described being ruminative about ongoing stressors.      Treatment Objective(s) Addressed in This Session:    Client will practice emotion regulation skills at least 2x week. Client will learn 3 new anxiety  "management skills. Client will prioritize her \"own agenda\" (e.g., self care, personal/professional goals) by rejoining book club, completing 1 piece of writing, and scheduling one gathering with girlfriends by the end of this episode of care.      Intervention:   CBT: identify self-defeating thoughts and behaviors; challenge and replace with more adaptable thoughts; identify emotions and function of emotions; reinforce here and now living and proactive leisure planning; teach effective/proactive communication/conflict management skills; DBT: teach and reinforce interpersonal effectiveness and boundary setting; teach and reinforce effective communication and self care, teach self-validation skills, teach perspective taking for interpersonal effectiveness; MI: point out discrepancies; evoke change talk, challenge sustain talk, and gauge confidence for change; build on client's self-efficacy        ASSESSMENT: Current Emotional / Mental Status (status of significant symptoms):   Risk status (Self / Other harm or suicidal ideation)   Client denies current fears or concerns for personal safety.   Client denies current or recent suicidal ideation or behaviors.   Client denies current or recent homicidal ideation or behaviors.   Client denies current or recent self injurious behavior or ideation.   Client denies other safety concerns.   A safety and risk management plan has not been developed at this time, however client was given the after-hours number / 911 should there be a change in any of these risk factors.     Appearance:   Appropriate    Eye Contact:   Good    Psychomotor Behavior: Normal    Attitude:   Cooperative    Orientation:   All   Speech    Rate / Production: Normal     Volume:  Normal    Mood:    \"Good\"   Affect:    Appropriate     Thought Content:  Clear  Rumination    Thought Form:  Coherent  Logical  Circumstantial   Insight:    Fair     Medication Review:   No current psychiatric medications " prescribed     Medication Compliance:   NA     Changes in Health Issues:   None reported     Chemical Use Review:   Substance Use: Chemical use reviewed, no active concerns identified      Tobacco Use: No current tobacco use     Collateral Reports Completed:   Not Applicable      PLAN: (Client Tasks / Therapist Tasks / Other)  Therapist will assign homework of skill practice; provide educational materials on emotion regulation; role-play effective communication skills; teach emotional recognition/identification.        Wayne Proctor, [facility]                                                         ________________________________________________________________________    Treatment Plan    Client's Name: Elly Ballard  YOB: 1975    Date: 6/26/2018    Diagnoses: PROVISIONAL 296.32 (F33.1) Major Depressive Disorder, Recurrent Episode, Moderate, 300.02 (F41.1) Generalized Anxiety Disorder, Unspecified Trauma/Stress; RULE OUT Posttraumatic Stress Disorder  Psychosocial & Contextual Factors: Limited social support, care giving for daughter with MI issues, both daughters are on the spectrum, some family stress  WHODAS: 21    Referral / Collaboration:  Referral to another professional/service is not indicated at this time.    Anticipated number of session or this episode of care: 12      MeasurableTreatment Goal(s) related to diagnosis / functional impairment(s)  Goal 1: Client will improved mood and trauma/stress related symptoms as evidenced by decreased score on PHQ 9 from 8 to 0.    I will know I've met my goal when I am more happy and can form positive memories.      Objective #A (Client Action)    Client will practice emotion regulation skills at least 2x week.  Status: New - Date: 6/26/2018     Intervention(s)  Therapist will assign homework of skill practice; provide educational materials on emotion regulation; role-play effective communication skills; teach emotional  "recognition/identification.    Objective #B  Client will maintain healthy lifestyle behaviors at least 1x week (exercise/physical activity).  Status: New - Date: 6/26/2018     Intervention(s)  Therapist will assign homework of routine development; role-play conflict management; teach the client how to perform a behavioral chain analysis.    Goal 2: Client will better manage anxiety and trauma/stress related symptoms as evidenced by decreased score on JORDON from 6 to 0.    I will know I've met my goal when I feel like I'm not carrying unprocessed stressors.      Objective #A (Client Action)    Client will learn 3 new anxiety management skills.  Status: New - Date: 6/26/2018     Intervention(s)  Therapist will assign homework of skill practice; provide educational materials on anxiety management/relaxation exercises; role-play skills; teach distraction skills.    Objective #B  Client will prioritize her \"own agenda\" (e.g., self care, personal/professional goals) by rejoining book club, completing 1 piece of writing, and scheduling one gathering with girlfriends by the end of this episode of care.    Status: New - Date: 6/26/2018      Intervention(s)  Therapist will assign homework healthy leisure/self-care planning; provide educational materials on what is self care; role-play conflict management; teach about healthy boundaries.      Client has reviewed and agreed to the above plan.      TRACY Fritz  June 26, 2018    "

## 2018-07-18 ASSESSMENT — ANXIETY QUESTIONNAIRES: GAD7 TOTAL SCORE: 6

## 2018-07-18 ASSESSMENT — PATIENT HEALTH QUESTIONNAIRE - PHQ9: SUM OF ALL RESPONSES TO PHQ QUESTIONS 1-9: 4

## 2018-08-15 ENCOUNTER — OFFICE VISIT (OUTPATIENT)
Dept: PSYCHOLOGY | Facility: CLINIC | Age: 43
End: 2018-08-15
Payer: COMMERCIAL

## 2018-08-15 DIAGNOSIS — F43.9 TRAUMA AND STRESSOR-RELATED DISORDER: ICD-10-CM

## 2018-08-15 DIAGNOSIS — F33.1 MAJOR DEPRESSIVE DISORDER, RECURRENT, MODERATE (H): Primary | ICD-10-CM

## 2018-08-15 DIAGNOSIS — F41.1 GENERALIZED ANXIETY DISORDER: ICD-10-CM

## 2018-08-15 PROCEDURE — 90834 PSYTX W PT 45 MINUTES: CPT | Performed by: COUNSELOR

## 2018-08-15 ASSESSMENT — ANXIETY QUESTIONNAIRES
3. WORRYING TOO MUCH ABOUT DIFFERENT THINGS: SEVERAL DAYS
IF YOU CHECKED OFF ANY PROBLEMS ON THIS QUESTIONNAIRE, HOW DIFFICULT HAVE THESE PROBLEMS MADE IT FOR YOU TO DO YOUR WORK, TAKE CARE OF THINGS AT HOME, OR GET ALONG WITH OTHER PEOPLE: SOMEWHAT DIFFICULT
5. BEING SO RESTLESS THAT IT IS HARD TO SIT STILL: NOT AT ALL
1. FEELING NERVOUS, ANXIOUS, OR ON EDGE: SEVERAL DAYS
6. BECOMING EASILY ANNOYED OR IRRITABLE: SEVERAL DAYS
7. FEELING AFRAID AS IF SOMETHING AWFUL MIGHT HAPPEN: SEVERAL DAYS
2. NOT BEING ABLE TO STOP OR CONTROL WORRYING: SEVERAL DAYS
GAD7 TOTAL SCORE: 6

## 2018-08-15 ASSESSMENT — PATIENT HEALTH QUESTIONNAIRE - PHQ9: 5. POOR APPETITE OR OVEREATING: SEVERAL DAYS

## 2018-08-15 NOTE — PROGRESS NOTES
Progress Note    Client Name: Elly Ballard  Date: 8/15/2018         Service Type: Individual      Session Start Time: 11:05a  Session End Time: 11:45a      Session Length: 40 minutes     Session #: 5     Attendees: Client attended alone    Treatment Plan Last Reviewed: 8/15/2018  PHQ-9 / JORDON-7 : 6 & 6       DATA      Progress Since Last Session (Related to Symptoms / Goals / Homework):   Symptoms: Stable, see Epic for PHQ 9 and JORDON 7 updates    Homework: Partially completed and continued - client will work to enjoy vacation and identify strategies for decluttering the mind before bed time - to do list. By next appt, client will continue to assess appropriate assertiveness modeling for daughter and continue to engage in healthy leisure.       Episode of Care Goals: Some progress - PREPARATION (Decided to change - considering how); Intervened by negotiating a change plan and determining options / strategies for behavior change, identifying triggers, exploring social supports, and working towards setting a date to begin behavior change     Current / Ongoing Stressors and Concerns:   Reported continued stable mood and anxiety due to feeling like summer keeps daughters away from troubles that come with the school year; reported some difficulties with summer activities themselves, but having more positive experiences to offset them; reported one incident of having to support daughter to report work issue, but daughter was minimized and then having to trouble shoot how to support daughter to advocate for herself - overall felt like she did a good  daughter, but questioned if help seeking is traumatic for daughter as daughter is often dismissed; reported enjoying herself on vacation and feeling hopeful that she can still have positive experiences in light of ongoing stressors.      Treatment Objective(s) Addressed in This Session:    Client will practice emotion  "regulation skills at least 2x week. Client will learn 3 new anxiety management skills. Client will prioritize her \"own agenda\" (e.g., self care, personal/professional goals) by rejoining book club, completing 1 piece of writing, and scheduling one gathering with girlfriends by the end of this episode of care.      Intervention:   CBT: identify self-defeating thoughts and behaviors; challenge and replace with more adaptable thoughts; identify emotions and function of emotions; reinforce here and now living and proactive leisure planning; teach effective/proactive communication/conflict management skills, teach healthy modeling skills for self-advocacy; DBT: teach and reinforce interpersonal effectiveness and boundary setting; teach and reinforce effective communication and self care, teach self-validation skills, teach perspective taking for interpersonal effectiveness; MI: point out discrepancies; evoke change talk, challenge sustain talk, and gauge confidence for change; build on client's self-efficacy        ASSESSMENT: Current Emotional / Mental Status (status of significant symptoms):   Risk status (Self / Other harm or suicidal ideation)   Client denies current fears or concerns for personal safety.   Client denies current or recent suicidal ideation or behaviors.   Client denies current or recent homicidal ideation or behaviors.   Client denies current or recent self injurious behavior or ideation.   Client denies other safety concerns.   A safety and risk management plan has not been developed at this time, however client was given the after-hours number / 911 should there be a change in any of these risk factors.     Appearance:   Appropriate    Eye Contact:   Good    Psychomotor Behavior: Normal    Attitude:   Cooperative    Orientation:   All   Speech    Rate / Production: Normal     Volume:  Normal    Mood:    \"Good\"   Affect:    Appropriate     Thought Content:  Clear  Some rumination    Thought " Form:  Coherent  Logical  Circumstantial   Insight:    Fair     Medication Review:   No current psychiatric medications prescribed     Medication Compliance:   NA     Changes in Health Issues:   None reported     Chemical Use Review:   Substance Use: Chemical use reviewed, no active concerns identified      Tobacco Use: No current tobacco use     Collateral Reports Completed:   Not Applicable      PLAN: (Client Tasks / Therapist Tasks / Other)  Therapist will assign homework of skill practice; provide educational materials on emotion regulation; role-play effective communication skills; teach emotional recognition/identification. Continue to reinforce boundary setting and assertiveness skills.         Wayne Proctor [facility]                                                         ________________________________________________________________________    Treatment Plan    Client's Name: Elly Ballard  YOB: 1975    Date: 6/26/2018    Diagnoses: PROVISIONAL 296.32 (F33.1) Major Depressive Disorder, Recurrent Episode, Moderate, 300.02 (F41.1) Generalized Anxiety Disorder, Unspecified Trauma/Stress; RULE OUT Posttraumatic Stress Disorder  Psychosocial & Contextual Factors: Limited social support, care giving for daughter with MI issues, both daughters are on the spectrum, some family stress  WHODAS: 21    Referral / Collaboration:  Referral to another professional/service is not indicated at this time.    Anticipated number of session or this episode of care: 12      MeasurableTreatment Goal(s) related to diagnosis / functional impairment(s)  Goal 1: Client will improved mood and trauma/stress related symptoms as evidenced by decreased score on PHQ 9 from 8 to 0.    I will know I've met my goal when I am more happy and can form positive memories.      Objective #A (Client Action)    Client will practice emotion regulation skills at least 2x week.  Status: New - Date: 6/26/2018     Intervention(s)  Therapist  "will assign homework of skill practice; provide educational materials on emotion regulation; role-play effective communication skills; teach emotional recognition/identification.    Objective #B  Client will maintain healthy lifestyle behaviors at least 1x week (exercise/physical activity).  Status: New - Date: 6/26/2018     Intervention(s)  Therapist will assign homework of routine development; role-play conflict management; teach the client how to perform a behavioral chain analysis.    Goal 2: Client will better manage anxiety and trauma/stress related symptoms as evidenced by decreased score on JORDON from 6 to 0.    I will know I've met my goal when I feel like I'm not carrying unprocessed stressors.      Objective #A (Client Action)    Client will learn 3 new anxiety management skills.  Status: New - Date: 6/26/2018     Intervention(s)  Therapist will assign homework of skill practice; provide educational materials on anxiety management/relaxation exercises; role-play skills; teach distraction skills.    Objective #B  Client will prioritize her \"own agenda\" (e.g., self care, personal/professional goals) by rejoining book club, completing 1 piece of writing, and scheduling one gathering with girlfriends by the end of this episode of care.    Status: New - Date: 6/26/2018      Intervention(s)  Therapist will assign homework healthy leisure/self-care planning; provide educational materials on what is self care; role-play conflict management; teach about healthy boundaries.      Client has reviewed and agreed to the above plan.      TRACY Fritz  June 26, 2018    "

## 2018-08-16 ASSESSMENT — ANXIETY QUESTIONNAIRES: GAD7 TOTAL SCORE: 6

## 2018-08-16 ASSESSMENT — PATIENT HEALTH QUESTIONNAIRE - PHQ9: SUM OF ALL RESPONSES TO PHQ QUESTIONS 1-9: 6

## 2018-08-28 ENCOUNTER — OFFICE VISIT (OUTPATIENT)
Dept: PSYCHOLOGY | Facility: CLINIC | Age: 43
End: 2018-08-28
Payer: COMMERCIAL

## 2018-08-28 DIAGNOSIS — F41.1 GENERALIZED ANXIETY DISORDER: ICD-10-CM

## 2018-08-28 DIAGNOSIS — F43.9 TRAUMA AND STRESSOR-RELATED DISORDER: ICD-10-CM

## 2018-08-28 DIAGNOSIS — F33.1 MAJOR DEPRESSIVE DISORDER, RECURRENT, MODERATE (H): Primary | ICD-10-CM

## 2018-08-28 PROCEDURE — 90834 PSYTX W PT 45 MINUTES: CPT | Performed by: COUNSELOR

## 2018-08-28 ASSESSMENT — ANXIETY QUESTIONNAIRES
GAD7 TOTAL SCORE: 7
3. WORRYING TOO MUCH ABOUT DIFFERENT THINGS: SEVERAL DAYS
7. FEELING AFRAID AS IF SOMETHING AWFUL MIGHT HAPPEN: SEVERAL DAYS
5. BEING SO RESTLESS THAT IT IS HARD TO SIT STILL: SEVERAL DAYS
1. FEELING NERVOUS, ANXIOUS, OR ON EDGE: SEVERAL DAYS
IF YOU CHECKED OFF ANY PROBLEMS ON THIS QUESTIONNAIRE, HOW DIFFICULT HAVE THESE PROBLEMS MADE IT FOR YOU TO DO YOUR WORK, TAKE CARE OF THINGS AT HOME, OR GET ALONG WITH OTHER PEOPLE: SOMEWHAT DIFFICULT
6. BECOMING EASILY ANNOYED OR IRRITABLE: SEVERAL DAYS
2. NOT BEING ABLE TO STOP OR CONTROL WORRYING: SEVERAL DAYS

## 2018-08-28 ASSESSMENT — PATIENT HEALTH QUESTIONNAIRE - PHQ9: 5. POOR APPETITE OR OVEREATING: SEVERAL DAYS

## 2018-08-28 NOTE — MR AVS SNAPSHOT
MRN:[id number]                      After Visit Summary   8/28/2018    Elly Ballard    MRN: [id number]           Visit Information        Provider Department      8/28/2018 10:00 AM Wayne Proctor [facility] Generic      Your next 10 appointments already scheduled     Sep 13, 2018 10:00 AM CDT   Return Visit with TRACY Fritz   [facility] ([facility])    [facility]  [address] [place], [place] [id number]  [facility] [address]   [phone]              MyChart Information     [employer] gives you secure access to your electronic health record. If you see a primary care provider, you can also send messages to your care team and make appointments. If you have questions, please call your primary care clinic.  If you do not have a primary care provider, please call [phone] and they will assist you.        Care EveryWhere ID     This is your Care EveryWhere ID. This could be used by other organizations to access your [place] medical records  [id number]        Equal Access to Services     VIC COTTRELL AH: Hadii wild lestero Soteresaali, waaxda luqadaha, qaybta kaalmada adeegyada, marino sow. So [facility] [phone].    ATENCIÓN: Si habla español, tiene a funk disposición servicios gratuitos de asistencia lingüística. Emilyame al [phone].    We comply with applicable federal civil rights laws and [place] laws. We do not discriminate on the basis of race, color, national origin, age, disability, sex, sexual orientation, or gender identity.

## 2018-08-28 NOTE — PROGRESS NOTES
"                                           Progress Note    Client Name: Elly Ballard  Date: 8/28/2018         Service Type: Individual      Session Start Time: 10:10a  Session End Time: 10:50a      Session Length: 40 minutes     Session #: 6     Attendees: Client attended alone    Treatment Plan Last Reviewed: 8/29/2018  PHQ-9 / JORDON-7 : 5 & 7       DATA      Progress Since Last Session (Related to Symptoms / Goals / Homework):   Symptoms: Stable, see Epic for PHQ 9 and JORDON 7 updates    Homework: Partially completed and continued - client will continue to assess appropriate assertiveness modeling for daughter and continue to engage in healthy leisure. By next appt, client will work to help daughters with routine before and after school and manage anxiety as daughters are starting school as well as give self permission to relax.      Episode of Care Goals: Some progress - ACTION (Actively working towards change); Intervened by reinforcing change plan / affirming steps taken     Current / Ongoing Stressors and Concerns:   Reported anxiety as school is starting and daughters completed orientation; reported feeling hopeful that oldest daughter will have a good year, but is concerned about younger daughter as her close friend moved away; was able to set limits with herself and with support from daughter was able to skip out on orientation due to feeling triggered by school and staff; expressed feeling cautious overall as she has had too many negative experiences with daughters and also reported feeling frustrated and defeated that she is often the one to catch difficulties daughters are having before providers/teachers do.      Treatment Objective(s) Addressed in This Session:    Client will practice emotion regulation skills at least 2x week. Client will learn 3 new anxiety management skills. Client will prioritize her \"own agenda\" (e.g., self care, personal/professional goals) by rejoining book club, completing 1 " piece of writing, and scheduling one gathering with girlfriends by the end of this episode of care.      Intervention:   CBT: identify self-defeating thoughts and behaviors; challenge and replace with more adaptable thoughts; identify emotions and function of emotions; reinforce here and now living and proactive leisure planning; teach effective/proactive communication/conflict management skills, teach healthy modeling skills for self-advocacy; DBT: teach and reinforce interpersonal effectiveness and boundary setting; teach and reinforce effective communication and self care, teach self-validation skills, teach perspective taking for interpersonal effectiveness; MI: point out discrepancies; evoke change talk, challenge sustain talk, and gauge confidence for change; build on client's self-efficacy        ASSESSMENT: Current Emotional / Mental Status (status of significant symptoms):   Risk status (Self / Other harm or suicidal ideation)   Client denies current fears or concerns for personal safety.   Client denies current or recent suicidal ideation or behaviors.   Client denies current or recent homicidal ideation or behaviors.   Client denies current or recent self injurious behavior or ideation.   Client denies other safety concerns.   A safety and risk management plan has not been developed at this time, however client was given the after-hours number / 911 should there be a change in any of these risk factors.     Appearance:   Appropriate    Eye Contact:   Good    Psychomotor Behavior: Normal    Attitude:   Cooperative    Orientation:   All   Speech    Rate / Production: Normal     Volume:  Normal    Mood:    Anxious Sad Tearful   Affect:    Appropriate     Thought Content:  Clear  Rumination    Thought Form:  Coherent  Logical  Circumstantial   Insight:    Fair     Medication Review:   No current psychiatric medications prescribed     Medication Compliance:   NA     Changes in Health Issues:   None  reported     Chemical Use Review:   Substance Use: Chemical use reviewed, no active concerns identified      Tobacco Use: No current tobacco use     Collateral Reports Completed:   Not Applicable      PLAN: (Client Tasks / Therapist Tasks / Other)  Therapist will assign homework of skill practice; provide educational materials on emotion regulation; role-play effective communication skills; teach emotional recognition/identification. Continue to reinforce boundary setting and assertiveness skills. Reinforce coping skills for anxiety, self-care, and limit setting.        Wayne Hitesh, [facility]                                                         ________________________________________________________________________    Treatment Plan    Client's Name: Elly Ballard  YOB: 1975    Date: 6/26/2018    Diagnoses: PROVISIONAL 296.32 (F33.1) Major Depressive Disorder, Recurrent Episode, Moderate, 300.02 (F41.1) Generalized Anxiety Disorder, Unspecified Trauma/Stress; RULE OUT Posttraumatic Stress Disorder  Psychosocial & Contextual Factors: Limited social support, care giving for daughter with MI issues, both daughters are on the spectrum, some family stress  WHODAS: 21    Referral / Collaboration:  Referral to another professional/service is not indicated at this time.    Anticipated number of session or this episode of care: 12      MeasurableTreatment Goal(s) related to diagnosis / functional impairment(s)  Goal 1: Client will improved mood and trauma/stress related symptoms as evidenced by decreased score on PHQ 9 from 8 to 0.    I will know I've met my goal when I am more happy and can form positive memories.      Objective #A (Client Action)    Client will practice emotion regulation skills at least 2x week.  Status: New - Date: 6/26/2018     Intervention(s)  Therapist will assign homework of skill practice; provide educational materials on emotion regulation; role-play effective communication  "skills; teach emotional recognition/identification.    Objective #B  Client will maintain healthy lifestyle behaviors at least 1x week (exercise/physical activity).  Status: New - Date: 6/26/2018     Intervention(s)  Therapist will assign homework of routine development; role-play conflict management; teach the client how to perform a behavioral chain analysis.    Goal 2: Client will better manage anxiety and trauma/stress related symptoms as evidenced by decreased score on JORDON from 6 to 0.    I will know I've met my goal when I feel like I'm not carrying unprocessed stressors.      Objective #A (Client Action)    Client will learn 3 new anxiety management skills.  Status: New - Date: 6/26/2018     Intervention(s)  Therapist will assign homework of skill practice; provide educational materials on anxiety management/relaxation exercises; role-play skills; teach distraction skills.    Objective #B  Client will prioritize her \"own agenda\" (e.g., self care, personal/professional goals) by rejoining book club, completing 1 piece of writing, and scheduling one gathering with girlfriends by the end of this episode of care.    Status: New - Date: 6/26/2018      Intervention(s)  Therapist will assign homework healthy leisure/self-care planning; provide educational materials on what is self care; role-play conflict management; teach about healthy boundaries.      Client has reviewed and agreed to the above plan.      TRACY Fritz  June 26, 2018    "

## 2018-08-29 ASSESSMENT — PATIENT HEALTH QUESTIONNAIRE - PHQ9: SUM OF ALL RESPONSES TO PHQ QUESTIONS 1-9: 5

## 2018-08-29 ASSESSMENT — ANXIETY QUESTIONNAIRES: GAD7 TOTAL SCORE: 7

## 2018-09-13 ENCOUNTER — OFFICE VISIT (OUTPATIENT)
Dept: PSYCHOLOGY | Facility: CLINIC | Age: 43
End: 2018-09-13
Payer: COMMERCIAL

## 2018-09-13 DIAGNOSIS — F43.9 TRAUMA AND STRESSOR-RELATED DISORDER: ICD-10-CM

## 2018-09-13 DIAGNOSIS — F41.1 GENERALIZED ANXIETY DISORDER: ICD-10-CM

## 2018-09-13 DIAGNOSIS — F33.1 MAJOR DEPRESSIVE DISORDER, RECURRENT, MODERATE (H): Primary | ICD-10-CM

## 2018-09-13 PROCEDURE — 90834 PSYTX W PT 45 MINUTES: CPT | Performed by: COUNSELOR

## 2018-09-13 ASSESSMENT — ANXIETY QUESTIONNAIRES
2. NOT BEING ABLE TO STOP OR CONTROL WORRYING: NOT AT ALL
GAD7 TOTAL SCORE: 4
1. FEELING NERVOUS, ANXIOUS, OR ON EDGE: SEVERAL DAYS
3. WORRYING TOO MUCH ABOUT DIFFERENT THINGS: SEVERAL DAYS
IF YOU CHECKED OFF ANY PROBLEMS ON THIS QUESTIONNAIRE, HOW DIFFICULT HAVE THESE PROBLEMS MADE IT FOR YOU TO DO YOUR WORK, TAKE CARE OF THINGS AT HOME, OR GET ALONG WITH OTHER PEOPLE: SOMEWHAT DIFFICULT
7. FEELING AFRAID AS IF SOMETHING AWFUL MIGHT HAPPEN: SEVERAL DAYS
5. BEING SO RESTLESS THAT IT IS HARD TO SIT STILL: NOT AT ALL
6. BECOMING EASILY ANNOYED OR IRRITABLE: NOT AT ALL

## 2018-09-13 ASSESSMENT — PATIENT HEALTH QUESTIONNAIRE - PHQ9: 5. POOR APPETITE OR OVEREATING: SEVERAL DAYS

## 2018-09-13 NOTE — PROGRESS NOTES
"                                           Progress Note    Client Name: Elly Ballard  Date: 9/13/2018         Service Type: Individual      Session Start Time: 10:05a  Session End Time: 10:50a      Session Length: 45 minutes     Session #: 7     Attendees: Client attended alone    Treatment Plan Last Reviewed: 9/13/2018  PHQ-9 / JORDON-7 : 2 & 4       DATA      Progress Since Last Session (Related to Symptoms / Goals / Homework):   Symptoms: Stable, see Epic for PHQ 9 and JORDON 7 updates    Homework: Partially completed and continued - client will work to help daughters with routine before and after school and manage anxiety as daughters are starting school as well as give self permission to relax.      Episode of Care Goals: Some progress - ACTION (Actively working towards change); Intervened by reinforcing change plan / affirming steps taken     Current / Ongoing Stressors and Concerns:   Reported stable mood and anxiety - feeling cautiously hopeful for the school year to go well; reported some difficulties experienced by older daughter, but overall feeling better able to manage emotions and to support daughter; reported engaging in self care by bonding with daughters, staying connected with , and making plans to be outside running; expressed feeling heart broken that a friend is going through a similar experience with her child, but feeling validated that she is not alone.      Treatment Objective(s) Addressed in This Session:    Client will practice emotion regulation skills at least 2x week. Client will learn 3 new anxiety management skills. Client will prioritize her \"own agenda\" (e.g., self care, personal/professional goals) by rejoining book club, completing 1 piece of writing, and scheduling one gathering with girlfriends by the end of this episode of care.      Intervention:   CBT: identify self-defeating thoughts and behaviors; challenge and replace with more adaptable thoughts; identify emotions " "and function of emotions; reinforce here and now living and proactive leisure planning; teach effective/proactive communication/conflict management skills, teach healthy modeling skills for self-advocacy; DBT: teach and reinforce interpersonal effectiveness and boundary setting; teach and reinforce effective communication and self care, teach self-validation skills, teach perspective taking for interpersonal effectiveness and validation skills; MI: point out discrepancies; evoke change talk, challenge sustain talk, and gauge confidence for change; build on client's self-efficacy        ASSESSMENT: Current Emotional / Mental Status (status of significant symptoms):   Risk status (Self / Other harm or suicidal ideation)   Client denies current fears or concerns for personal safety.   Client denies current or recent suicidal ideation or behaviors.   Client denies current or recent homicidal ideation or behaviors.   Client denies current or recent self injurious behavior or ideation.   Client denies other safety concerns.   A safety and risk management plan has not been developed at this time, however client was given the after-hours number / 911 should there be a change in any of these risk factors.     Appearance:   Appropriate    Eye Contact:   Good    Psychomotor Behavior: Normal    Attitude:   Cooperative    Orientation:   All   Speech    Rate / Production: Normal     Volume:  Normal    Mood:    \"Hopeful\"   Affect:    Appropriate     Thought Content:  Clear  Less rumination    Thought Form:  Coherent  Logical  Circumstantial   Insight:    Good     Medication Review:   No current psychiatric medications prescribed     Medication Compliance:   NA     Changes in Health Issues:   None reported     Chemical Use Review:   Substance Use: Chemical use reviewed, no active concerns identified      Tobacco Use: No current tobacco use     Collateral Reports Completed:   Not Applicable      PLAN: (Client Tasks / Therapist Tasks " / Other)  Therapist will assign homework of skill practice; provide educational materials on emotion regulation; role-play effective communication skills; teach emotional recognition/identification. Continue to reinforce boundary setting and assertiveness skills. Reinforce coping skills for anxiety, self-care, and limit setting.        Wayne Proctor [facility]                                                         ________________________________________________________________________    Treatment Plan    Client's Name: Elly Ballard  YOB: 1975    Date: 6/26/2018    Diagnoses: PROVISIONAL 296.32 (F33.1) Major Depressive Disorder, Recurrent Episode, Moderate, 300.02 (F41.1) Generalized Anxiety Disorder, Unspecified Trauma/Stress; RULE OUT Posttraumatic Stress Disorder  Psychosocial & Contextual Factors: Limited social support, care giving for daughter with MI issues, both daughters are on the spectrum, some family stress  WHODAS: 21    Referral / Collaboration:  Referral to another professional/service is not indicated at this time.    Anticipated number of session or this episode of care: 12      MeasurableTreatment Goal(s) related to diagnosis / functional impairment(s)  Goal 1: Client will improved mood and trauma/stress related symptoms as evidenced by decreased score on PHQ 9 from 8 to 0.    I will know I've met my goal when I am more happy and can form positive memories.      Objective #A (Client Action)    Client will practice emotion regulation skills at least 2x week.  Status: New - Date: 6/26/2018     Intervention(s)  Therapist will assign homework of skill practice; provide educational materials on emotion regulation; role-play effective communication skills; teach emotional recognition/identification.    Objective #B  Client will maintain healthy lifestyle behaviors at least 1x week (exercise/physical activity).  Status: New - Date: 6/26/2018     Intervention(s)  Therapist will assign  "homework of routine development; role-play conflict management; teach the client how to perform a behavioral chain analysis.    Goal 2: Client will better manage anxiety and trauma/stress related symptoms as evidenced by decreased score on JORDON from 6 to 0.    I will know I've met my goal when I feel like I'm not carrying unprocessed stressors.      Objective #A (Client Action)    Client will learn 3 new anxiety management skills.  Status: New - Date: 6/26/2018     Intervention(s)  Therapist will assign homework of skill practice; provide educational materials on anxiety management/relaxation exercises; role-play skills; teach distraction skills.    Objective #B  Client will prioritize her \"own agenda\" (e.g., self care, personal/professional goals) by rejoining book club, completing 1 piece of writing, and scheduling one gathering with girlfriends by the end of this episode of care.    Status: New - Date: 6/26/2018      Intervention(s)  Therapist will assign homework healthy leisure/self-care planning; provide educational materials on what is self care; role-play conflict management; teach about healthy boundaries.      Client has reviewed and agreed to the above plan.      Wayne Proctor [facility]ARD[facility]  June 26, 2018    "

## 2018-09-13 NOTE — MR AVS SNAPSHOT
MRN:[id number]                      After Visit Summary   9/13/2018    Elly Ballard    MRN: [id number]           Visit Information        Provider Department      9/13/2018 10:00 AM Wayne Proctor [facility] Generic      Your next 10 appointments already scheduled     Oct 03, 2018  9:00 AM CDT   New Visit with Mabel Gill, SOUMYA   [facility]    [address] [facility] [address] [phone]            Oct 04, 2018 10:00 AM CDT   Return Visit with Wayne Proctor [facility] ([facility])    [facility]  [address] [facility]  [facility] [address] [phone]              MyChart Information     [employer] gives you secure access to your electronic health record. If you see a primary care provider, you can also send messages to your care team and make appointments. If you have questions, please call your primary care clinic.  If you do not have a primary care provider, please call [phone] and they will assist you.        Care EveryWhere ID     This is your Care EveryWhere ID. This could be used by other organizations to access your [facility] medical records  [id number]        Equal Access to Services     VIC COTTRELL : Hadii wild lestero Soteresaali, waaxda luqadaha, qaybta kaalmada adeegyada, marino sow. So [facility] [phone].    ATENCIÓN: Si habla español, tiene a funk disposición servicios gratuitos de asistencia lingüística. Llame al [phone].    We comply with applicable federal civil rights laws and [place] laws. We do not discriminate on the basis of race, color, national origin, age, disability, sex, sexual orientation, or gender identity.

## 2018-09-14 ASSESSMENT — ANXIETY QUESTIONNAIRES: GAD7 TOTAL SCORE: 4

## 2018-09-14 ASSESSMENT — PATIENT HEALTH QUESTIONNAIRE - PHQ9: SUM OF ALL RESPONSES TO PHQ QUESTIONS 1-9: 2

## 2018-10-03 ENCOUNTER — OFFICE VISIT (OUTPATIENT)
Dept: OPTOMETRY | Facility: CLINIC | Age: 43
End: 2018-10-03
Payer: COMMERCIAL

## 2018-10-03 DIAGNOSIS — H52.13 MYOPIA WITH PRESBYOPIA OF BOTH EYES: Primary | ICD-10-CM

## 2018-10-03 DIAGNOSIS — H52.4 MYOPIA WITH PRESBYOPIA OF BOTH EYES: Primary | ICD-10-CM

## 2018-10-03 PROCEDURE — 92004 COMPRE OPH EXAM NEW PT 1/>: CPT | Performed by: OPTOMETRIST

## 2018-10-03 PROCEDURE — 92015 DETERMINE REFRACTIVE STATE: CPT | Performed by: OPTOMETRIST

## 2018-10-03 ASSESSMENT — REFRACTION_MANIFEST
OD_ADD: +1.00
OD_SPHERE: -0.25
OD_SPHERE: -0.50
OS_CYLINDER: SPHERE
OD_CYLINDER: SPHERE
METHOD_AUTOREFRACTION: 1
OS_ADD: +1.00
OS_SPHERE: -0.25
OS_SPHERE: -0.25

## 2018-10-03 ASSESSMENT — CUP TO DISC RATIO
OD_RATIO: 0.2
OS_RATIO: 0.2

## 2018-10-03 ASSESSMENT — EXTERNAL EXAM - LEFT EYE: OS_EXAM: NORMAL

## 2018-10-03 ASSESSMENT — CONF VISUAL FIELD
OS_NORMAL: 1
OD_NORMAL: 1
METHOD: COUNTING FINGERS

## 2018-10-03 ASSESSMENT — EXTERNAL EXAM - RIGHT EYE: OD_EXAM: NORMAL

## 2018-10-03 ASSESSMENT — VISUAL ACUITY
OS_SC+: -1
METHOD: SNELLEN - LINEAR
OD_SC: 20/30-2
OD_SC: 20/20
OS_SC: 20/20
OS_SC: 20/30-1

## 2018-10-03 ASSESSMENT — TONOMETRY
OS_IOP_MMHG: 14
OD_IOP_MMHG: 14
IOP_METHOD: APPLANATION

## 2018-10-03 ASSESSMENT — SLIT LAMP EXAM - LIDS
COMMENTS: NORMAL
COMMENTS: NORMAL

## 2018-10-03 NOTE — PROGRESS NOTES
Chief Complaint   Patient presents with     COMPREHENSIVE EYE EXAM         Last Eye Exam: First Eye exam  Dilated Previously: No, side effects of dilation explained today    What are you currently using to see?  does not use glasses or contacts       Distance Vision Acuity: Satisfied with vision    Near Vision Acuity: Satisfied with vision while reading  Unaided - Pt does find herself holding small print materials further away    Eye Comfort: good  Do you use eye drops? 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       OBJECTIVE: See Ophthalmology exam    ASSESSMENT:    ICD-10-CM    1. Myopia with presbyopia of both eyes H52.13 EYE EXAM (SIMPLE-NONBILLABLE)    H52.4 REFRACTION      PLAN:     Patient Instructions   Use +1.00 over the counter reading glasses as needed for near.    Your eyes may be blurry at near and sensitive to light for several hours from the dilating drops.    Yearly eye exams recommended.    Thank you!

## 2018-10-03 NOTE — LETTER
10/3/2018         RE: Elly Ballard  [address] [place] [address]        Dear Colleague,    Thank you for referring your patient, Elly Ballard, to the [facility]. Please see a copy of my visit note below.    Chief Complaint   Patient presents with     COMPREHENSIVE EYE EXAM         Last Eye Exam: First Eye exam  Dilated Previously: No, side effects of dilation explained today    What are you currently using to see?  does not use glasses or contacts       Distance Vision Acuity: Satisfied with vision    Near Vision Acuity: Satisfied with vision while reading  Unaided - Pt does find herself holding small print materials further away    Eye Comfort: good  Do you use eye drops? 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       OBJECTIVE: See Ophthalmology exam    ASSESSMENT:    ICD-10-CM    1. Myopia with presbyopia of both eyes H52.13 EYE EXAM (SIMPLE-NONBILLABLE)    H52.4 REFRACTION      PLAN:     Patient Instructions   Use +1.00 over the counter reading glasses as needed for near.    Your eyes may be blurry at near and sensitive to light for several hours from the dilating drops.    Yearly eye exams recommended.    Thank you!         Again, thank you for allowing me to participate in the care of your patient.        Sincerely,        Mabel Gill OD

## 2018-10-03 NOTE — MR AVS SNAPSHOT
After Visit Summary   10/3/2018    Elly Ballard    MRN: [id number]           Patient Information     Date Of Birth          1975        Visit Information        Provider Department      10/3/2018 9:00 AM Mabel Gill OD [facility]        Today's Diagnoses     Myopia with presbyopia of both eyes    -  1      Care Instructions    Use +1.00 over the counter reading glasses as needed for near.    Your eyes may be blurry at near and sensitive to light for several hours from the dilating drops.    Yearly eye exams recommended.    Thank you!          Follow-ups after your visit        Your next 10 appointments already scheduled     Oct 04, 2018 10:00 AM CDT   Return Visit with Wayne Proctor [facility] ([facility]  [address] [place], [place] [address]  [facility] [address] [phone]              Who to contact     If you have questions or need follow up information about today's clinic visit or your schedule please contact [facility] directly at [phone].  Normal or non-critical lab and imaging results will be communicated to you by [employer], letter or phone within 4 business days after the clinic has received the results. If you do not hear from us within 7 days, please contact the clinic through bCommunitiest or phone. If you have a critical or abnormal lab result, we will notify you by phone as soon as possible.  Submit refill requests through Wapi or call your pharmacy and they will forward the refill request to us. Please allow 3 business days for your refill to be completed.          Additional Information About Your Visit        MyChart Information     [employer] gives you secure access to your electronic health record. If you see a primary care provider, you can also send messages to your care team and make appointments. If you have questions, please call your primary care clinic.  If you  do not have a primary care provider, please call [phone] and they will assist you.        Care EveryWhere ID     This is your Care EveryWhere ID. This could be used by other organizations to access your [facility] medical records  [id number]         Blood Pressure from Last 3 Encounters:   05/04/18 101/64   04/19/18 115/74   12/20/16 122/72    Weight from Last 3 Encounters:   05/04/18 56.2 kg (124 lb)   04/19/18 56.7 kg (125 lb)   12/20/16 56.2 kg (124 lb)              We Performed the Following     EYE EXAM (SIMPLE-NONBILLABLE)     REFRACTION        Primary Care Provider Office Phone # Fax #    Nika HERMINIO [facility] [phone]       [address] [facility] [address]  [facility] [address]        Equal Access to Services     VIC COTTRELL : Hadii aad ku hadasho Soomaali, waaxda luqadaha, qaybta kaalmada adeegyada, marino ring hayaaconstantino hill . So [facility] [phone].    ATENCIÓN: Si habla español, tiene a funk disposición servicios gratuitos de asistencia lingüística. Emilyame al [phone].    We comply with applicable federal civil rights laws and [place] laws. We do not discriminate on the basis of race, color, national origin, age, disability, sex, sexual orientation, or gender identity.            Thank you!     Thank you for choosing [facility]  for your care. Our goal is always to provide you with excellent care. Hearing back from our patients is one way we can continue to improve our services. Please take a few minutes to complete the written survey that you may receive in the mail after your visit with us. Thank you!             Your Updated Medication List - Protect others around you: Learn how to safely use, store and throw away your medicines at www.disposemymeds.org.          This list is accurate as of 10/3/18 10:09 AM.  Always use your most recent med list.                   Brand Name Dispense Instructions for use Diagnosis    cholecalciferol 62637 units  capsule    VITAMIN D3    8 capsule    Take 1 capsule (50,000 Units) by mouth once a week    Vitamin D deficiency

## 2018-10-03 NOTE — PATIENT INSTRUCTIONS
Use +1.00 over the counter reading glasses as needed for near.    Your eyes may be blurry at near and sensitive to light for several hours from the dilating drops.    Yearly eye exams recommended.    Thank you!

## 2018-10-04 ENCOUNTER — OFFICE VISIT (OUTPATIENT)
Dept: PSYCHOLOGY | Facility: CLINIC | Age: 43
End: 2018-10-04
Payer: COMMERCIAL

## 2018-10-04 DIAGNOSIS — F33.1 MAJOR DEPRESSIVE DISORDER, RECURRENT, MODERATE (H): Primary | ICD-10-CM

## 2018-10-04 DIAGNOSIS — F43.9 TRAUMA AND STRESSOR-RELATED DISORDER: ICD-10-CM

## 2018-10-04 DIAGNOSIS — F41.1 GENERALIZED ANXIETY DISORDER: ICD-10-CM

## 2018-10-04 PROCEDURE — 90834 PSYTX W PT 45 MINUTES: CPT | Performed by: COUNSELOR

## 2018-10-04 ASSESSMENT — ANXIETY QUESTIONNAIRES
2. NOT BEING ABLE TO STOP OR CONTROL WORRYING: SEVERAL DAYS
IF YOU CHECKED OFF ANY PROBLEMS ON THIS QUESTIONNAIRE, HOW DIFFICULT HAVE THESE PROBLEMS MADE IT FOR YOU TO DO YOUR WORK, TAKE CARE OF THINGS AT HOME, OR GET ALONG WITH OTHER PEOPLE: SOMEWHAT DIFFICULT
3. WORRYING TOO MUCH ABOUT DIFFERENT THINGS: SEVERAL DAYS
5. BEING SO RESTLESS THAT IT IS HARD TO SIT STILL: SEVERAL DAYS
1. FEELING NERVOUS, ANXIOUS, OR ON EDGE: SEVERAL DAYS
6. BECOMING EASILY ANNOYED OR IRRITABLE: SEVERAL DAYS
7. FEELING AFRAID AS IF SOMETHING AWFUL MIGHT HAPPEN: NOT AT ALL
GAD7 TOTAL SCORE: 7

## 2018-10-04 ASSESSMENT — PATIENT HEALTH QUESTIONNAIRE - PHQ9: 5. POOR APPETITE OR OVEREATING: MORE THAN HALF THE DAYS
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
## 2018-10-04 NOTE — MR AVS SNAPSHOT
MRN:[id number]                      After Visit Summary   10/4/2018    Elly Ballard    MRN: [id number]           Visit Information        Provider Department      10/4/2018 10:00 AM Hitesh Wayne [facility] Generic      Your next 10 appointments already scheduled     Nov 02, 2018 10:00 AM CDT   Return Visit with Wayne Proctor [facility] ([place] [address] [facility]  [facility] [address] [phone]            Nov 30, 2018  9:00 AM CST   Return Visit with Wayne Proctor [facility] ([facility]  [address]  [facility] [id number] [phone]              MyChart Information     [employer] gives you secure access to your electronic health record. If you see a primary care provider, you can also send messages to your care team and make appointments. If you have questions, please call your primary care clinic.  If you do not have a primary care provider, please call [phone] and they will assist you.        Care EveryWhere ID     This is your Care EveryWhere ID. This could be used by other organizations to access your [facility] medical records  [id number]        Equal Access to Services     VIC COTTRELL : Sarah serrato Sobetsy, waaxda luqadaha, qaybta kaalmada adeegyada, marino sow. So [facility] [phone].    ATENCIÓN: Si habla español, tiene a funk disposición servicios gratuitos de asistencia lingüística. Llkan al [phone].    We comply with applicable federal civil rights laws and [place] laws. We do not discriminate on the basis of race, color, national origin, age, disability, sex, sexual orientation, or gender identity.

## 2018-10-05 ASSESSMENT — PATIENT HEALTH QUESTIONNAIRE - PHQ9: SUM OF ALL RESPONSES TO PHQ QUESTIONS 1-9: 4

## 2018-10-05 ASSESSMENT — ANXIETY QUESTIONNAIRES: GAD7 TOTAL SCORE: 7

## 2018-11-02 ENCOUNTER — OFFICE VISIT (OUTPATIENT)
Dept: PSYCHOLOGY | Facility: CLINIC | Age: 43
End: 2018-11-02
Payer: COMMERCIAL

## 2018-11-02 DIAGNOSIS — F43.9 TRAUMA AND STRESSOR-RELATED DISORDER: ICD-10-CM

## 2018-11-02 DIAGNOSIS — F41.1 GENERALIZED ANXIETY DISORDER: ICD-10-CM

## 2018-11-02 DIAGNOSIS — F33.1 MAJOR DEPRESSIVE DISORDER, RECURRENT, MODERATE (H): Primary | ICD-10-CM

## 2018-11-02 PROCEDURE — 90834 PSYTX W PT 45 MINUTES: CPT | Performed by: COUNSELOR

## 2018-11-02 ASSESSMENT — ANXIETY QUESTIONNAIRES
IF YOU CHECKED OFF ANY PROBLEMS ON THIS QUESTIONNAIRE, HOW DIFFICULT HAVE THESE PROBLEMS MADE IT FOR YOU TO DO YOUR WORK, TAKE CARE OF THINGS AT HOME, OR GET ALONG WITH OTHER PEOPLE: SOMEWHAT DIFFICULT
5. BEING SO RESTLESS THAT IT IS HARD TO SIT STILL: NOT AT ALL
7. FEELING AFRAID AS IF SOMETHING AWFUL MIGHT HAPPEN: SEVERAL DAYS
GAD7 TOTAL SCORE: 6
2. NOT BEING ABLE TO STOP OR CONTROL WORRYING: SEVERAL DAYS
6. BECOMING EASILY ANNOYED OR IRRITABLE: NOT AT ALL
3. WORRYING TOO MUCH ABOUT DIFFERENT THINGS: MORE THAN HALF THE DAYS
1. FEELING NERVOUS, ANXIOUS, OR ON EDGE: SEVERAL DAYS

## 2018-11-02 ASSESSMENT — PATIENT HEALTH QUESTIONNAIRE - PHQ9
SUM OF ALL RESPONSES TO PHQ QUESTIONS 1-9: 10
5. POOR APPETITE OR OVEREATING: SEVERAL DAYS

## 2018-11-02 NOTE — PROGRESS NOTES
"                                           Progress Note    Client Name: Elly Ballard  Date: 11/2/2018         Service Type: Individual      Session Start Time: 10:00a  Session End Time: 10:45a      Session Length: 45 minutes     Session #: 9     Attendees: Client attended alone    Treatment Plan Last Reviewed: 11/2/2018  PHQ-9 / JORDON-7 : 10 & 6       DATA      Progress Since Last Session (Related to Symptoms / Goals / Homework):   Symptoms: Somewhat worsened, see Epic for PHQ 9 and JORDON 7 updates    Homework: Not completed and continued - client will engage in weekly social outing. Client will continue to assess isolation and inactivity.      Episode of Care Goals: Some progress - ACTION (Actively working towards change); Intervened by reinforcing change plan / affirming steps taken     Current / Ongoing Stressors and Concerns:   Reported somewhat worsened mood and anxiety due to witnessing increased depression in daughter as well as being informed about other negative experiences daughter is having at school; reported feeling isolated that friends and family stopped checking in with her about daughter although she can identify that they also have a lot going on in their life; acknowledged needing more support and connection; reported working to see daughter as resilient and to see the positives in life.      Treatment Objective(s) Addressed in This Session:    Client will practice emotion regulation skills at least 2x week. Client will learn 3 new anxiety management skills. Client will prioritize her \"own agenda\" (e.g., self care, personal/professional goals) by rejoining book club, completing 1 piece of writing, and scheduling one gathering with girlfriends by the end of this episode of care.      Intervention:   CBT: identify self-defeating thoughts and behaviors; challenge and replace with more adaptable thoughts; identify emotions and function of emotions; reinforce here and now living and proactive " leisure planning; teach effective/proactive communication/conflict management skills, teach healthy modeling skills for self-advocacy; DBT: teach and reinforce interpersonal effectiveness and boundary setting; teach and reinforce effective communication and self care, teach self-validation skills as well as non-judgmental stance, teach perspective taking for interpersonal effectiveness and validation skills, teach PLEASE skills; MI: point out discrepancies; evoke change talk, challenge sustain talk, and gauge confidence for change; build on client's self-efficacy        ASSESSMENT: Current Emotional / Mental Status (status of significant symptoms):   Risk status (Self / Other harm or suicidal ideation)   Client denies current fears or concerns for personal safety.   Client denies current or recent suicidal ideation or behaviors.   Client denies current or recent homicidal ideation or behaviors.   Client denies current or recent self injurious behavior or ideation.   Client denies other safety concerns.   A safety and risk management plan has not been developed at this time, however client was given the after-hours number / 911 should there be a change in any of these risk factors.     Appearance:   Appropriate    Eye Contact:   Good    Psychomotor Behavior: Normal    Attitude:   Cooperative    Orientation:   All   Speech    Rate / Production: Normal     Volume:  Normal    Mood:    Depressed Sad Tearful    Affect:    Mood congruent    Thought Content:  Clear  Some rumination    Thought Form:  Coherent  Logical  Circumstantial   Insight:    Good     Medication Review:   No current psychiatric medications prescribed     Medication Compliance:   NA     Changes in Health Issues:   None reported     Chemical Use Review:   Substance Use: Chemical use reviewed, no active concerns identified      Tobacco Use: No current tobacco use     Collateral Reports Completed:   Not Applicable      PLAN: (Client Tasks / Therapist Tasks  / Other)  Therapist will assign homework of skill practice; provide educational materials on emotion regulation; role-play effective communication skills; teach emotional recognition/identification. Continue to reinforce boundary setting and assertiveness skills. Reinforce coping skills for anxiety, self-care, and limit setting. Continue to teach healthy social leisure, communication, and strength building.         Wayne Proctor [facility]                                                         ________________________________________________________________________    Treatment Plan    Client's Name: Elly Ballard  YOB: 1975    Date: 6/26/2018    Diagnoses: PROVISIONAL 296.32 (F33.1) Major Depressive Disorder, Recurrent Episode, Moderate, 300.02 (F41.1) Generalized Anxiety Disorder, Unspecified Trauma/Stress; RULE OUT Posttraumatic Stress Disorder  Psychosocial & Contextual Factors: Limited social support, care giving for daughter with MI issues, both daughters are on the spectrum, some family stress  WHODAS: 21    Referral / Collaboration:  Referral to another professional/service is not indicated at this time.    Anticipated number of session or this episode of care: 12      MeasurableTreatment Goal(s) related to diagnosis / functional impairment(s)  Goal 1: Client will improved mood and trauma/stress related symptoms as evidenced by decreased score on PHQ 9 from 8 to 0.    I will know I've met my goal when I am more happy and can form positive memories.      Objective #A (Client Action)    Client will practice emotion regulation skills at least 2x week.  Status: New - Date: 6/26/2018     Intervention(s)  Therapist will assign homework of skill practice; provide educational materials on emotion regulation; role-play effective communication skills; teach emotional recognition/identification.    Objective #B  Client will maintain healthy lifestyle behaviors at least 1x week (exercise/physical  "activity).  Status: New - Date: 6/26/2018     Intervention(s)  Therapist will assign homework of routine development; role-play conflict management; teach the client how to perform a behavioral chain analysis.    Goal 2: Client will better manage anxiety and trauma/stress related symptoms as evidenced by decreased score on JORDON from 6 to 0.    I will know I've met my goal when I feel like I'm not carrying unprocessed stressors.      Objective #A (Client Action)    Client will learn 3 new anxiety management skills.  Status: New - Date: 6/26/2018     Intervention(s)  Therapist will assign homework of skill practice; provide educational materials on anxiety management/relaxation exercises; role-play skills; teach distraction skills.    Objective #B  Client will prioritize her \"own agenda\" (e.g., self care, personal/professional goals) by rejoining book club, completing 1 piece of writing, and scheduling one gathering with girlfriends by the end of this episode of care.    Status: New - Date: 6/26/2018      Intervention(s)  Therapist will assign homework healthy leisure/self-care planning; provide educational materials on what is self care; role-play conflict management; teach about healthy boundaries.      Client has reviewed and agreed to the above plan.      TRACY Fritz  June 26, 2018  "

## 2018-11-02 NOTE — MR AVS SNAPSHOT
MRN:[id number]                      After Visit Summary   11/2/2018    Elly Ballard    MRN: [id number]           Visit Information        Provider Department      11/2/2018 10:00 AM [facility]      Your next 10 appointments already scheduled     Nov 30, 2018  9:00 AM CST   Return Visit with TRACY Fritz   [facility] ([facility])    [facility]  [address] [place], [place] [id number]  [facility] [id number] [phone]              MyChart Information     [employer] gives you secure access to your electronic health record. If you see a primary care provider, you can also send messages to your care team and make appointments. If you have questions, please call your primary care clinic.  If you do not have a primary care provider, please call [phone] and they will assist you.        Care EveryWhere ID     This is your Care EveryWhere ID. This could be used by other organizations to access your [facility] medical records  [id number]        Equal Access to Services     VIC COTTRELL : Hadii wild lestero Sobetsy, waaxda luqadaha, qaybta kaalmada adeegyadeirdre, marino sow. [facility] [phone].    ATENCIÓN: Si habla español, tiene a funk disposición servicios gratuitos de asistencia lingüística. Llame al [phone].    We comply with applicable federal civil rights laws and [place] laws. We do not discriminate on the basis of race, color, national origin, age, disability, sex, sexual orientation, or gender identity.

## 2018-11-03 ASSESSMENT — ANXIETY QUESTIONNAIRES: GAD7 TOTAL SCORE: 6

## 2018-11-09 ENCOUNTER — TELEPHONE (OUTPATIENT)
Dept: TRANSPLANT | Facility: CLINIC | Age: 43
End: 2018-11-09

## 2018-11-09 DIAGNOSIS — Z00.5 TRANSPLANT DONOR EVALUATION: Primary | ICD-10-CM

## 2018-11-09 NOTE — TELEPHONE ENCOUNTER
"[facility] BREEZE  [id number]      LIVING KIDNEY DONOR EVALUATION  Donor First Name Elly Donor MRN    Donor Last Name Elio Completed 2018 7:46 PM    1975 Record ID [id number]   BREEZE Screen PASSED     Intended Recipient  Recipient First Name Iris Recipient MRN    Recipient Last Name Elio Relationship Full Sibling   Recipient  1958 Recipient Diagnosis    Recipient's ABO      Donor Information  Age 43 Gender Female   Ht 170 cm (5' 7'') Race    Wt 58.5 kg (129 lbs) Ethnicity Not /   BMI 20.20 kg/m  Preferred Language English      Required No     Blood Type O   Demographics  Home Address [address] [place] [address] # [phone]   [employer] # [phone]   Zip Code [address] Type    Country [place] Preferred Contact day    Email [email or web address] Preferred Contact time 11:00 AM-1:00 PM   &&   Donor's Medical Information  Medical History Anxiety d/o NOS   Depression   Irritable Bowel Syndrome   Kidney Stones Medications None Reported   Surgical History None Reported Allergies NKDA   Social History EtOH: Occasional (1-2 drinks/week)   Illicit Drug Use: Denies   Tobacco: Denies Self-Reported Functional Status \"I am able to participate in strenuous sports such as swimming, singles tennis, football, basketball, or skiing\"   Family Medical History Cancer (denies)   Diabetes (Sibling, Aunt or Uncle, Cousin)   Heart Disease (denies)   Hypertension (denies)   Kidney Disease (Sibling)   Kidney Stones (denies) Exercise Frequency Exercise (1 X per week)   Review of Organ Systems  Review of Systems Airway or Lungs: No   Blood Disorder: No   Cancer: No   Diabetes,Thyroid,Adrenal,Endocrine Disorder: No   Digestive or Liver: Yes   Female Health: No   Heart or Circulatory System: No   Immune Diseases: No   Kidneys and Bladder: Yes   Muscles,Bones,Joints: No   Neuro: No   Psych: Yes   &&   Donor's Social " Information  Marital Status  Living Accommodation Owns own home/apartment   Level of Education College or baccalaureate degree complete Living Arrangement With spouse   Employment Status Part Time Concerns: health and life insurance No   Employer [employer] Concerns: job security and lost income No   Occupation      Medical Insurance Status Has medical insurance     High Risk Behavior  High Risk Behaviors Blood transfusion < 12 months. (NO)   Commercial sex < 12 months. (NO)   Illicit IV drug use < 5yrs. (NO)   Other high risk sexual contact < 12 months. (NO)   Reason for Donation  Referral Tx Candidate Reason for Donation My older sister's quality of life is diminished by kidney disease.   Permission to Disclose Inquiry Yes Patient Comments    Donor Motivation Level Highly motivated donor     PCP Contact  PCP Name Nika Avon   PCP [facility]   PCP Phone [phone]   Emergency Contact  First Name Fernando First Name Laurie   Last Name Emely Last Name Anthony   Phone # [phone] Phone #    Phone Type Mobile Phone Type Mobile   Relationship Spouse Relationship Friend or Other   Office Use  Reviewed By    Reviewed 11/8/2018 8:45 AM   Admin Folder Accept   Comments 11/8 - Passed Eval   Lost for Followup    Extended Comments    BREEZE ID [email or web address]:[id number] survey status completed   Activity History  Call  Task    Due Date 11/8/2018   Last Modified Date/Time 11/8/2018 12:01 PM   Comments

## 2018-11-09 NOTE — TELEPHONE ENCOUNTER
Is abo O.Hx kidney stones x2 due to taking Ca based lax-had developed stones. Discussed in future we would need to do [employer].Will send Phase 1 orders/pkt.

## 2018-11-20 ENCOUNTER — OFFICE VISIT (OUTPATIENT)
Dept: FAMILY MEDICINE | Facility: CLINIC | Age: 43
End: 2018-11-20

## 2018-11-20 ENCOUNTER — ALLIED HEALTH/NURSE VISIT (OUTPATIENT)
Dept: NURSING | Facility: CLINIC | Age: 43
End: 2018-11-20

## 2018-11-20 VITALS
OXYGEN SATURATION: 100 % | SYSTOLIC BLOOD PRESSURE: 100 MMHG | BODY MASS INDEX: 19.58 KG/M2 | DIASTOLIC BLOOD PRESSURE: 66 MMHG | WEIGHT: 125 LBS | RESPIRATION RATE: 16 BRPM | TEMPERATURE: 97.8 F | HEART RATE: 60 BPM

## 2018-11-20 VITALS — SYSTOLIC BLOOD PRESSURE: 96 MMHG | DIASTOLIC BLOOD PRESSURE: 60 MMHG

## 2018-11-20 DIAGNOSIS — Z00.5 TRANSPLANT DONOR EVALUATION: ICD-10-CM

## 2018-11-20 DIAGNOSIS — I10 HYPERTENSION, UNSPECIFIED TYPE: Primary | ICD-10-CM

## 2018-11-20 LAB
ABO + RH BLD: NORMAL
ABO + RH BLD: NORMAL
ALBUMIN UR-MCNC: NEGATIVE MG/DL
APPEARANCE UR: CLEAR
BACTERIA #/AREA URNS HPF: ABNORMAL /HPF
BILIRUB UR QL STRIP: NEGATIVE
COLOR UR AUTO: YELLOW
CREAT UR-MCNC: 55 MG/DL
GLUCOSE UR STRIP-MCNC: NEGATIVE MG/DL
HGB BLD-MCNC: 12.7 G/DL (ref 11.7–15.7)
HGB UR QL STRIP: NEGATIVE
KETONES UR STRIP-MCNC: NEGATIVE MG/DL
LEUKOCYTE ESTERASE UR QL STRIP: NEGATIVE
NITRATE UR QL: NEGATIVE
NON-SQ EPI CELLS #/AREA URNS LPF: ABNORMAL /LPF
PH UR STRIP: 8 PH (ref 5–7)
PROT UR-MCNC: <0.05 G/L
PROT/CREAT 24H UR: NORMAL G/G CR (ref 0–0.2)
RBC #/AREA URNS AUTO: ABNORMAL /HPF
SOURCE: ABNORMAL
SP GR UR STRIP: 1.01 (ref 1–1.03)
SPECIMEN EXP DATE BLD: NORMAL
UROBILINOGEN UR STRIP-ACNC: 0.2 EU/DL (ref 0.2–1)
WBC #/AREA URNS AUTO: ABNORMAL /HPF

## 2018-11-20 PROCEDURE — 81001 URINALYSIS AUTO W/SCOPE: CPT | Performed by: FAMILY MEDICINE

## 2018-11-20 PROCEDURE — 36415 COLL VENOUS BLD VENIPUNCTURE: CPT | Performed by: TRANSPLANT SURGERY

## 2018-11-20 PROCEDURE — 82043 UR ALBUMIN QUANTITATIVE: CPT | Performed by: TRANSPLANT SURGERY

## 2018-11-20 PROCEDURE — 85018 HEMOGLOBIN: CPT | Performed by: TRANSPLANT SURGERY

## 2018-11-20 PROCEDURE — 84156 ASSAY OF PROTEIN URINE: CPT | Performed by: TRANSPLANT SURGERY

## 2018-11-20 PROCEDURE — 82947 ASSAY GLUCOSE BLOOD QUANT: CPT | Performed by: TRANSPLANT SURGERY

## 2018-11-20 PROCEDURE — 86900 BLOOD TYPING SEROLOGIC ABO: CPT | Performed by: TRANSPLANT SURGERY

## 2018-11-20 PROCEDURE — 82565 ASSAY OF CREATININE: CPT | Performed by: TRANSPLANT SURGERY

## 2018-11-20 PROCEDURE — 99207 ZZC NO BILLABLE SERVICE THIS VISIT: CPT | Performed by: FAMILY MEDICINE

## 2018-11-20 PROCEDURE — 99207 ZZC NO CHARGE NURSE ONLY: CPT

## 2018-11-21 ENCOUNTER — TELEPHONE (OUTPATIENT)
Dept: TRANSPLANT | Facility: CLINIC | Age: 43
End: 2018-11-21

## 2018-11-21 LAB
CREAT SERPL-MCNC: 0.66 MG/DL (ref 0.52–1.04)
CREAT UR-MCNC: 54 MG/DL
GFR SERPL CREATININE-BSD FRML MDRD: >90 ML/MIN/1.7M2
GLUCOSE SERPL-MCNC: 79 MG/DL (ref 70–99)
MICROALBUMIN UR-MCNC: <5 MG/L
MICROALBUMIN/CREAT UR: NORMAL MG/G CR (ref 0–25)

## 2018-11-26 ENCOUNTER — TELEPHONE (OUTPATIENT)
Dept: TRANSPLANT | Facility: CLINIC | Age: 43
End: 2018-11-26

## 2018-11-26 NOTE — TELEPHONE ENCOUNTER
I left a voice mail message asking Elly to call me back to complete an initial psychosocial screening.  She is interested in living kidney donation for her sister.    ROMELIA Manley, [facility]  Clinical  and Independent Donor Advocate  [facility]  Pager:  [phone]  Direct:  [phone]

## 2018-11-26 NOTE — TELEPHONE ENCOUNTER
I spoke with Elly today at length about her interest in living kidney donation.  Elly is  and has two children who are teenagers.  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  Elly is one of 7 children.  Her older sister is the potential recipient.  Elly does not smoke and drinks 2 to 4 servings of alcohol per week at most.  There is no history of alcohol abuse, and Elly denies any illicit drug use or abuse.  Elly does see a counselor regularly to help her with coping with stress and worry.  She tells me that her daughter, who is 17, was the victim of bullying at school and has had her own mental health consequences as a result.  This has been very trying for Elly, so she sees a counselor, does yoga, and runs as ways to cope.  Her daughter is doing well in school now, but has lots of her own mental health needs.  Pete tells me that she and her  are certainly weighing this as she considers living kidney donation.  Pete's  is a respiratory therapist in the [employer] system and works full time as well.      I support Elly's desire to come forward for evaluation as a potential donor for her sister.    ROMELIA Manley, [facility]  Clinical  and Independent Donor Advocate  [facility]  Pager:  [phone]  Direct:  [phone]

## 2018-11-28 ENCOUNTER — TELEPHONE (OUTPATIENT)
Dept: TRANSPLANT | Facility: CLINIC | Age: 43
End: 2018-11-28

## 2018-11-28 NOTE — TELEPHONE ENCOUNTER
Elly wants to cont process. Will now need to do [facility] test since had hx kidney stone. Info sent to her and orders faxed to [facility] [employer]

## 2018-11-28 NOTE — LETTER
REIMBURSEMENT INFORMATION FOR LIVING ORGAN DONORS    LIVING ORGAN DONOR: This form MUST accompany & remain attached to Orders &  given to Provider and/or Healthcare Facility Business Office    PROVIDER/FACILITY INSTRUCTIONS: By accepting to perform these services for living organ  donation, the provider/facility agrees to exclusively bill the [facility] instead of billing  the patient or any insurance provider and agrees to accept the reimbursement, as described below, as  payment in full for services rendered.    PROVIDER BILLING INSTRUCTIONS:  1. [facility] agrees to pay for all authorized testing ordered by our transplant  program that is related to living organ donation. The attached orders/tests are part of the donor  Evaluation.    2. Do not bill the donor or donor's insurance. Send an itemized invoice, claim or statement to:    [facility]  Transplant Finance/Donor Billing  [address] [place] [address]    3. Billing statements must include the patient first and last name, date of birth, the CPT procedure code  and date of service. Please bill service on the ORIGINAL UBO4 or 1500 with appropriate CPT/HCPCS  codes along with W-9 and send to the above address to insure timely reimbursement.    4. Claims should be submitted no later than six months from the date when services are rendered.  Claims denied for late submission should not be billed to the donor or their private insurance carrier.    5. [facility] will reimburse all charges at 100% of the Medicare Fee Schedule as  defined in the Code of Federal Regulations (CFR) 42, Chapter IV. This is to be considered payment  in full. [facility], the patient, and/or the patient's insurance are NOT to  be billed any balance, co-payment, or deductible, per Medicare regulations. **ATTN: Facility  providing services for attached/enclosed Living  Donor Orders; If facility does NOT AGREE to  the reimbursement rate stated above, PLEASE DENY SERVICES & refer Donor/patient back to  their [facility].    6. Patients are NOT to make any payments at the time of service.    Please forward this information to your billing department so that a donor account can be set up with  these instructions.    Should you have any questions, please contact the Donor Billing office at [phone],  Monday - Friday, 8:00 a.m. to 4:00 p.m.   Thank you for your assistance.

## 2018-11-30 ENCOUNTER — OFFICE VISIT (OUTPATIENT)
Dept: PSYCHOLOGY | Facility: CLINIC | Age: 43
End: 2018-11-30
Payer: COMMERCIAL

## 2018-11-30 DIAGNOSIS — F43.9 TRAUMA AND STRESSOR-RELATED DISORDER: ICD-10-CM

## 2018-11-30 DIAGNOSIS — F41.1 GENERALIZED ANXIETY DISORDER: ICD-10-CM

## 2018-11-30 DIAGNOSIS — F33.1 MAJOR DEPRESSIVE DISORDER, RECURRENT, MODERATE (H): Primary | ICD-10-CM

## 2018-11-30 PROCEDURE — 90834 PSYTX W PT 45 MINUTES: CPT | Performed by: COUNSELOR

## 2018-11-30 ASSESSMENT — ANXIETY QUESTIONNAIRES
6. BECOMING EASILY ANNOYED OR IRRITABLE: SEVERAL DAYS
GAD7 TOTAL SCORE: 10
IF YOU CHECKED OFF ANY PROBLEMS ON THIS QUESTIONNAIRE, HOW DIFFICULT HAVE THESE PROBLEMS MADE IT FOR YOU TO DO YOUR WORK, TAKE CARE OF THINGS AT HOME, OR GET ALONG WITH OTHER PEOPLE: SOMEWHAT DIFFICULT
2. NOT BEING ABLE TO STOP OR CONTROL WORRYING: MORE THAN HALF THE DAYS
1. FEELING NERVOUS, ANXIOUS, OR ON EDGE: SEVERAL DAYS
3. WORRYING TOO MUCH ABOUT DIFFERENT THINGS: MORE THAN HALF THE DAYS
7. FEELING AFRAID AS IF SOMETHING AWFUL MIGHT HAPPEN: SEVERAL DAYS
5. BEING SO RESTLESS THAT IT IS HARD TO SIT STILL: SEVERAL DAYS

## 2018-11-30 ASSESSMENT — PATIENT HEALTH QUESTIONNAIRE - PHQ9
SUM OF ALL RESPONSES TO PHQ QUESTIONS 1-9: 11
5. POOR APPETITE OR OVEREATING: MORE THAN HALF THE DAYS

## 2018-11-30 NOTE — PROGRESS NOTES
"                                           Progress Note    Client Name: Elly Ballard  Date: 11/30/2018         Service Type: Individual      Session Start Time: 9:15a  Session End Time: 9:55a      Session Length: 40 minutes     Session #: 10     Attendees: Client attended alone    Treatment Plan Last Reviewed: 11/30/2018  PHQ-9 / JORDON-7 : 11 & 10       DATA      Progress Since Last Session (Related to Symptoms / Goals / Homework):   Symptoms: Somewhat worsened, see Epic for PHQ 9 and JORDON 7 updates    Homework: Partially completed and continued - client will continue to assess isolation and inactivity. She will also work to see the positives and assess commitment to kidney transplant.       Episode of Care Goals: Some progress - ACTION (Actively working towards change); Intervened by reinforcing change plan / affirming steps taken     Current / Ongoing Stressors and Concerns:   Reported somewhat worsened mood and anxiety due to daughter's continued negative experiences at school (\"I can't believe we have to go through this again\"); expressed feeling triggered by this and having her own secondary trauma \"flare up\"; described feeling hopeless/helpless although she was able to attend to some positives (e.g., daughter feeling supported by art teach, daughter's bf is supportive, 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      Treatment Objective(s) Addressed in This Session:    Client will practice emotion regulation skills at least 2x week. Client will learn 3 new anxiety management skills. Client will prioritize her \"own agenda\" (e.g., self care, personal/professional goals) by rejoining book club, completing 1 piece of writing, and scheduling one " gathering with girlfriends by the end of this episode of care.      Intervention:   CBT: identify self-defeating thoughts and behaviors; challenge and replace with more adaptable thoughts; identify emotions and function of emotions; reinforce here and now living and proactive leisure planning; teach effective/proactive communication/conflict management skills, teach healthy modeling skills for self-advocacy; DBT: teach and reinforce interpersonal effectiveness and boundary setting; teach and reinforce effective communication and self care, teach self-validation skills as well as non-judgmental stance, teach perspective taking for interpersonal effectiveness and validation skills, teach PLEASE skills; MI: point out discrepancies; evoke change talk, challenge sustain talk, and gauge confidence for change; build on client's self-efficacy        ASSESSMENT: Current Emotional / Mental Status (status of significant symptoms):   Risk status (Self / Other harm or suicidal ideation)   Client denies current fears or concerns for personal safety.   Client denies current or recent suicidal ideation or behaviors.   Client denies current or recent homicidal ideation or behaviors.   Client denies current or recent self injurious behavior or ideation.   Client denies other safety concerns.   A safety and risk management plan has not been developed at this time, however client was given the after-hours number / 911 should there be a change in any of these risk factors.     Appearance:   Appropriate    Eye Contact:   Good    Psychomotor Behavior: Normal    Attitude:   Cooperative    Orientation:   All   Speech    Rate / Production: Normal     Volume:  Normal    Mood:    Depressed Sad Tearful    Affect:    Mood congruent    Thought Content:  Clear  Some rumination    Thought Form:  Coherent  Logical  Circumstantial   Insight:    Good     Medication Review:   No current psychiatric medications prescribed     Medication  Compliance:   NA     Changes in Health Issues:   None reported     Chemical Use Review:   Substance Use: Chemical use reviewed, no active concerns identified      Tobacco Use: No current tobacco use     Collateral Reports Completed:   Not Applicable      PLAN: (Client Tasks / Therapist Tasks / Other)  Therapist will assign homework of skill practice; provide educational materials on emotion regulation; role-play effective communication skills; teach emotional recognition/identification. Continue to reinforce boundary setting and assertiveness skills. Reinforce coping skills for anxiety, self-care, and limit setting. Continue to teach healthy social leisure, communication, and strength building.         Wayne Proctor [facility]                                                         ________________________________________________________________________    Treatment Plan    Client's Name: Elly Ballard  YOB: 1975    Date: 6/26/2018    Diagnoses: PROVISIONAL 296.32 (F33.1) Major Depressive Disorder, Recurrent Episode, Moderate, 300.02 (F41.1) Generalized Anxiety Disorder, Unspecified Trauma/Stress; RULE OUT Posttraumatic Stress Disorder  Psychosocial & Contextual Factors: Limited social support, care giving for daughter with MI issues, both daughters are on the spectrum, some family stress  WHODAS: 21    Referral / Collaboration:  Referral to another professional/service is not indicated at this time.    Anticipated number of session or this episode of care: 12      MeasurableTreatment Goal(s) related to diagnosis / functional impairment(s)  Goal 1: Client will improved mood and trauma/stress related symptoms as evidenced by decreased score on PHQ 9 from 8 to 0.    I will know I've met my goal when I am more happy and can form positive memories.      Objective #A (Client Action)    Client will practice emotion regulation skills at least 2x week.  Status: New - Date: 6/26/2018  "    Intervention(s)  Therapist will assign homework of skill practice; provide educational materials on emotion regulation; role-play effective communication skills; teach emotional recognition/identification.    Objective #B  Client will maintain healthy lifestyle behaviors at least 1x week (exercise/physical activity).  Status: New - Date: 6/26/2018     Intervention(s)  Therapist will assign homework of routine development; role-play conflict management; teach the client how to perform a behavioral chain analysis.    Goal 2: Client will better manage anxiety and trauma/stress related symptoms as evidenced by decreased score on JORDON from 6 to 0.    I will know I've met my goal when I feel like I'm not carrying unprocessed stressors.      Objective #A (Client Action)    Client will learn 3 new anxiety management skills.  Status: New - Date: 6/26/2018     Intervention(s)  Therapist will assign homework of skill practice; provide educational materials on anxiety management/relaxation exercises; role-play skills; teach distraction skills.    Objective #B  Client will prioritize her \"own agenda\" (e.g., self care, personal/professional goals) by rejoining book club, completing 1 piece of writing, and scheduling one gathering with girlfriends by the end of this episode of care.    Status: New - Date: 6/26/2018      Intervention(s)  Therapist will assign homework healthy leisure/self-care planning; provide educational materials on what is self care; role-play conflict management; teach about healthy boundaries.      Client has reviewed and agreed to the above plan.      TRACY Fritz  June 26, 2018  "

## 2018-11-30 NOTE — MR AVS SNAPSHOT
MRN:[id number]                      After Visit Summary   11/30/2018    Elly Ballard    MRN: [id number]           Visit Information        Provider Department      11/30/2018 9:00 AM Hitesh [facility] Generic      Your next 10 appointments already scheduled     Dec 13, 2018  2:30 PM CST   Return Visit with Wayne Proctor [facility]  [address]  [facility] [id number] [phone]            Dec 31, 2018  2:30 PM CST   Return Visit with Wayne Proctor [facility]  [address]  [facility] [id number] [phone]            Jan 14, 2019  5:30 PM CST   Return Visit with Wayne Proctor [facility]  [address] [place], [place] [id number]  [facility] [id number] [phone]              MyChart Information     [employer] gives you secure access to your electronic health record. If you see a primary care provider, you can also send messages to your care team and make appointments. If you have questions, please call your primary care clinic.  If you do not have a primary care provider, please call [phone] and they will assist you.        Care EveryWhere ID     This is your Care EveryWhere ID. This could be used by other organizations to access your [place] medical records  [id number]        Equal Access to Services     VIC COTTRELL AH: Hadii aad ku hadasho Soteresaali, waaxda luqadaha, qaybta kaalmada adeegyada, waxay maría elena coulter silviagilda sow. So [facility] [phone].    ATENCIÓN: Si habla español, tiene a funk disposición servicios gratuitos de asistencia lingüística. Llame al [phone].    We comply with applicable federal civil rights laws and [place] laws. We do not discriminate on the basis of race,  color, national origin, age, disability, sex, sexual orientation, or gender identity.

## 2018-12-01 ASSESSMENT — ANXIETY QUESTIONNAIRES: GAD7 TOTAL SCORE: 10

## 2018-12-05 ENCOUNTER — TRANSFERRED RECORDS (OUTPATIENT)
Dept: HEALTH INFORMATION MANAGEMENT | Facility: CLINIC | Age: 43
End: 2018-12-05

## 2018-12-13 ENCOUNTER — OFFICE VISIT (OUTPATIENT)
Dept: PSYCHOLOGY | Facility: CLINIC | Age: 43
End: 2018-12-13
Payer: COMMERCIAL

## 2018-12-13 ENCOUNTER — TELEPHONE (OUTPATIENT)
Dept: TRANSPLANT | Facility: CLINIC | Age: 43
End: 2018-12-13

## 2018-12-13 DIAGNOSIS — F33.1 MAJOR DEPRESSIVE DISORDER, RECURRENT, MODERATE (H): Primary | ICD-10-CM

## 2018-12-13 DIAGNOSIS — F43.9 TRAUMA AND STRESSOR-RELATED DISORDER: ICD-10-CM

## 2018-12-13 DIAGNOSIS — F41.1 GENERALIZED ANXIETY DISORDER: ICD-10-CM

## 2018-12-13 PROCEDURE — 90834 PSYTX W PT 45 MINUTES: CPT | Performed by: COUNSELOR

## 2018-12-13 NOTE — PROGRESS NOTES
"                                           Progress Note    Client Name: Elly Ballard  Date: 12/13/2018         Service Type: Individual      Session Start Time: 2:30p  Session End Time: 3:10p      Session Length: 40 minutes     Session #: 11     Attendees: Client attended alone    Treatment Plan Last Reviewed: 12/13/2018  PHQ-9 / JORDON-7 : 11 & 10       DATA      Progress Since Last Session (Related to Symptoms / Goals / Homework):   Symptoms: Stable, see Epic for PHQ 9 and JORDON 7 updates    Homework: Partially completed and continued - she will also work to see the positives and assess commitment to kidney transplant.       Episode of Care Goals: Some progress - ACTION (Actively working towards change); Intervened by reinforcing change plan / affirming steps taken     Current / Ongoing Stressors and Concerns:   Reported stable mood and anxiety although daughter continued to report experiences of discrimination and minimalization to her and she is feeling down as a result; reported working to see positives; was open to discussing medications although she is ambivalent about actually taking medications as she reported wanting to work on health holistically first.      Treatment Objective(s) Addressed in This Session:    Client will practice emotion regulation skills at least 2x week. Client will learn 3 new anxiety management skills. Client will prioritize her \"own agenda\" (e.g., self care, personal/professional goals) by rejoining book club, completing 1 piece of writing, and scheduling one gathering with girlfriends by the end of this episode of care.      Intervention:   CBT: identify self-defeating thoughts and behaviors; challenge and replace with more adaptable thoughts; identify emotions and function of emotions; reinforce here and now living and proactive leisure planning; teach effective/proactive communication/conflict management skills, teach healthy modeling skills for self-advocacy; DBT: teach and " "reinforce interpersonal effectiveness and boundary setting; teach and reinforce effective communication and self care, teach self-validation skills as well as non-judgmental stance, teach perspective taking for interpersonal effectiveness and validation skills, teach PLEASE skills; MI: point out discrepancies; evoke change talk, challenge sustain talk, and gauge confidence for change; build on client's self-efficacy        ASSESSMENT: Current Emotional / Mental Status (status of significant symptoms):   Risk status (Self / Other harm or suicidal ideation)   Client denies current fears or concerns for personal safety.   Client denies current or recent suicidal ideation or behaviors.   Client denies current or recent homicidal ideation or behaviors.   Client denies current or recent self injurious behavior or ideation.   Client denies other safety concerns.   A safety and risk management plan has not been developed at this time, however client was given the after-hours number / 911 should there be a change in any of these risk factors.     Appearance:   Appropriate    Eye Contact:   Good    Psychomotor Behavior: Normal    Attitude:   Cooperative    Orientation:   All   Speech    Rate / Production: Normal     Volume:  Normal    Mood:    Depressed Sad Tearful    Affect:    Mood congruent    Thought Content:  Clear  Some rumination    Thought Form:  Coherent  Logical  Circumstantial   Insight:    Good     Medication Review:   No current psychiatric medications prescribed - wants to work on mental health \"holistically\"      Medication Compliance:   NA     Changes in Health Issues:   None reported     Chemical Use Review:   Substance Use: Chemical use reviewed, no active concerns identified      Tobacco Use: No current tobacco use     Collateral Reports Completed:   Not Applicable      PLAN: (Client Tasks / Therapist Tasks / Other)  Therapist will assign homework of skill practice; provide educational materials on emotion " regulation; role-play effective communication skills; teach emotional recognition/identification. Continue to reinforce boundary setting and assertiveness skills. Reinforce coping skills for anxiety, self-care, and limit setting. Continue to teach healthy social leisure, communication, and strength building.         Wayne Proctor [facility]                                                         ________________________________________________________________________    Treatment Plan    Client's Name: Elly Ballard  YOB: 1975    Date: 6/26/2018    Diagnoses: PROVISIONAL 296.32 (F33.1) Major Depressive Disorder, Recurrent Episode, Moderate, 300.02 (F41.1) Generalized Anxiety Disorder, Unspecified Trauma/Stress; RULE OUT Posttraumatic Stress Disorder  Psychosocial & Contextual Factors: Limited social support, care giving for daughter with MI issues, both daughters are on the spectrum, some family stress  WHODAS: 21    Referral / Collaboration:  Referral to another professional/service is not indicated at this time.    Anticipated number of session or this episode of care: 12      MeasurableTreatment Goal(s) related to diagnosis / functional impairment(s)  Goal 1: Client will improved mood and trauma/stress related symptoms as evidenced by decreased score on PHQ 9 from 8 to 0.    I will know I've met my goal when I am more happy and can form positive memories.      Objective #A (Client Action)    Client will practice emotion regulation skills at least 2x week.  Status: New - Date: 6/26/2018     Intervention(s)  Therapist will assign homework of skill practice; provide educational materials on emotion regulation; role-play effective communication skills; teach emotional recognition/identification.    Objective #B  Client will maintain healthy lifestyle behaviors at least 1x week (exercise/physical activity).  Status: New - Date: 6/26/2018     Intervention(s)  Therapist will assign homework of routine  "development; role-play conflict management; teach the client how to perform a behavioral chain analysis.    Goal 2: Client will better manage anxiety and trauma/stress related symptoms as evidenced by decreased score on JORDON from 6 to 0.    I will know I've met my goal when I feel like I'm not carrying unprocessed stressors.      Objective #A (Client Action)    Client will learn 3 new anxiety management skills.  Status: New - Date: 6/26/2018     Intervention(s)  Therapist will assign homework of skill practice; provide educational materials on anxiety management/relaxation exercises; role-play skills; teach distraction skills.    Objective #B  Client will prioritize her \"own agenda\" (e.g., self care, personal/professional goals) by rejoining book club, completing 1 piece of writing, and scheduling one gathering with girlfriends by the end of this episode of care.    Status: New - Date: 6/26/2018      Intervention(s)  Therapist will assign homework healthy leisure/self-care planning; provide educational materials on what is self care; role-play conflict management; teach about healthy boundaries.      Client has reviewed and agreed to the above plan.      [facility]ne Proctor [facility]  June 26, 2018  "

## 2018-12-13 NOTE — TELEPHONE ENCOUNTER
Reviewed [employer] results with Dr Bright. States she needs to drink more fluids otherwise is OK to cont process. Informed Elly. Wants to go come for eval. Has and watched CD. [place] not left [place] in past 3 months.

## 2018-12-17 DIAGNOSIS — Z00.5 TRANSPLANT DONOR EVALUATION: ICD-10-CM

## 2018-12-31 ENCOUNTER — OFFICE VISIT (OUTPATIENT)
Dept: PSYCHOLOGY | Facility: CLINIC | Age: 43
End: 2018-12-31
Payer: COMMERCIAL

## 2018-12-31 DIAGNOSIS — F43.9 TRAUMA AND STRESSOR-RELATED DISORDER: ICD-10-CM

## 2018-12-31 DIAGNOSIS — F41.1 GENERALIZED ANXIETY DISORDER: ICD-10-CM

## 2018-12-31 DIAGNOSIS — F33.1 MAJOR DEPRESSIVE DISORDER, RECURRENT, MODERATE (H): Primary | ICD-10-CM

## 2018-12-31 PROCEDURE — 90834 PSYTX W PT 45 MINUTES: CPT | Performed by: COUNSELOR

## 2018-12-31 ASSESSMENT — ANXIETY QUESTIONNAIRES
7. FEELING AFRAID AS IF SOMETHING AWFUL MIGHT HAPPEN: SEVERAL DAYS
6. BECOMING EASILY ANNOYED OR IRRITABLE: NOT AT ALL
3. WORRYING TOO MUCH ABOUT DIFFERENT THINGS: SEVERAL DAYS
2. NOT BEING ABLE TO STOP OR CONTROL WORRYING: MORE THAN HALF THE DAYS
GAD7 TOTAL SCORE: 6
IF YOU CHECKED OFF ANY PROBLEMS ON THIS QUESTIONNAIRE, HOW DIFFICULT HAVE THESE PROBLEMS MADE IT FOR YOU TO DO YOUR WORK, TAKE CARE OF THINGS AT HOME, OR GET ALONG WITH OTHER PEOPLE: SOMEWHAT DIFFICULT
1. FEELING NERVOUS, ANXIOUS, OR ON EDGE: SEVERAL DAYS
5. BEING SO RESTLESS THAT IT IS HARD TO SIT STILL: NOT AT ALL

## 2018-12-31 ASSESSMENT — PATIENT HEALTH QUESTIONNAIRE - PHQ9
5. POOR APPETITE OR OVEREATING: SEVERAL DAYS
SUM OF ALL RESPONSES TO PHQ QUESTIONS 1-9: 3

## 2018-12-31 NOTE — PROGRESS NOTES
"                                           Progress Note    Client Name: Elly Ballard  Date: 12/31/2018         Service Type: Individual      Session Start Time: 2:30p  Session End Time: 3:15p      Session Length: 45 minutes     Session #: 12     Attendees: Client attended alone    Treatment Plan Last Reviewed: 12/31/2018  PHQ-9 / JORDON-7 : 3 & 6       DATA      Progress Since Last Session (Related to Symptoms / Goals / Homework):   Symptoms: Stable, see Epic for PHQ 9 and JORDON 7 updates    Homework: Client will work to pull away from daughter's issues, \"prioritize\" herself.      Episode of Care Goals: Some progress - ACTION (Actively working towards change); Intervened by reinforcing change plan / affirming steps taken     Current / Ongoing Stressors and Concerns:   Updated goals (see below); reported the holidays were easier with daughters out of school; noted that oldest daughter often risk takes in social activities and feeling stressed that she has to be there to support daughter when things do not go well; acknowledged ongoing need to pull away from daughter and to prioritize herself; will be completing final transplant assessment on Friday and thinks she will be approved as donor and feeling good about it, \"it's the right thing to do\".     Treatment Objective(s) Addressed in This Session:    Client will practice emotion regulation skills at least 2x week. Client will learn 3 new anxiety management skills. Client will prioritize her \"own agenda\" (e.g., self care, personal/professional goals) by rejoining book club, completing 1 piece of writing, and scheduling one gathering with girlfriends by the end of this episode of care.      Intervention:   CBT: identify self-defeating thoughts and behaviors; challenge and replace with more adaptable thoughts; identify emotions and function of emotions; reinforce here and now living and proactive leisure planning; teach effective/proactive communication/conflict " "management skills, teach healthy modeling skills for self-advocacy; DBT: teach and reinforce interpersonal effectiveness and boundary setting; teach and reinforce effective communication and self care, teach self-validation skills as well as non-judgmental stance, teach perspective taking for interpersonal effectiveness and validation skills, teach PLEASE skills; MI: point out discrepancies; evoke change talk, challenge sustain talk, and gauge confidence for change; build on client's self-efficacy        ASSESSMENT: Current Emotional / Mental Status (status of significant symptoms):   Risk status (Self / Other harm or suicidal ideation)   Client denies current fears or concerns for personal safety.   Client denies current or recent suicidal ideation or behaviors.   Client denies current or recent homicidal ideation or behaviors.   Client denies current or recent self injurious behavior or ideation.   Client denies other safety concerns.   A safety and risk management plan has not been developed at this time, however client was given the after-hours number / 911 should there be a change in any of these risk factors.     Appearance:   Appropriate    Eye Contact:   Good    Psychomotor Behavior: Normal    Attitude:   Cooperative    Orientation:   All   Speech    Rate / Production: Normal     Volume:  Normal    Mood:    Normal    Affect:    Mood congruent    Thought Content:  Clear  Less rumination    Thought Form:  Coherent  Logical  Circumstantial   Insight:    Good     Medication Review:   No current psychiatric medications prescribed - wants to work on mental health \"holistically\"      Medication Compliance:   NA     Changes in Health Issues:   None reported     Chemical Use Review:   Substance Use: Chemical use reviewed, no active concerns identified      Tobacco Use: No current tobacco use     Collateral Reports Completed:   Not Applicable      PLAN: (Client Tasks / Therapist Tasks / Other)  Therapist will assign " homework of skill practice; provide educational materials on emotion regulation; role-play effective communication skills; teach emotional recognition/identification. Continue to reinforce boundary setting and assertiveness skills. Reinforce coping skills for anxiety, self-care, and limit setting. Continue to teach healthy social leisure, communication, and strength building.         Wayne Proctor [facility]                                                         ________________________________________________________________________    Treatment Plan    Client's Name: Elly Ballard  YOB: 1975    Date: 12/31/2018    Diagnoses: PROVISIONAL 296.32 (F33.1) Major Depressive Disorder, Recurrent Episode, Moderate, 300.02 (F41.1) Generalized Anxiety Disorder, Unspecified Trauma/Stress; RULE OUT Posttraumatic Stress Disorder  Psychosocial & Contextual Factors: Limited social support, care giving for daughter with MI issues, both daughters are on the spectrum, some family stress  WHODAS: 21    Referral / Collaboration:  Referral to another professional/service is not indicated at this time.    Anticipated number of session or this episode of care: 12      MeasurableTreatment Goal(s) related to diagnosis / functional impairment(s)  Goal 1: Client will improved mood and trauma/stress related symptoms as evidenced by decreased score on PHQ 9 from 8 to 0.    I will know I've met my goal when I am more happy and can form positive memories.      Objective #A (Client Action)    Client will practice mindfulness and distraction skills to distance from stress/daughter's school stressors at least 2x week (pull back from daughter's issues).  Status: Updated - Date: 12/31/2018    Intervention(s)  Therapist will assign homework of skill practice; provide educational materials on emotion regulation; role-play effective communication skills; teach emotional recognition/identification.    Objective #B  Client will maintain  "healthy lifestyle behaviors at least 1x week (exercise/physical activity).  Status: Continued - Date: 12/31/2018    Intervention(s)  Therapist will assign homework of routine development; role-play conflict management; teach the client how to perform a behavioral chain analysis.    Goal 2: Client will better manage anxiety and trauma/stress related symptoms as evidenced by decreased score on JORDON from 6 to 0.    I will know I've met my goal when I feel like I'm not carrying unprocessed stressors.      Objective #A (Client Action)    Client will learn 3 new anxiety management skills.  Status: Continued - Date: 12/31/2018    Intervention(s)  Therapist will assign homework of skill practice; provide educational materials on anxiety management/relaxation exercises; role-play skills; teach distraction skills.    Objective #B  Client will prioritize her \"own agenda\" (e.g., self care, personal/professional goals) by rejoining book club, completing 1 piece of writing (WROTE ONE POEM FOR DAUGHTER'S BDAY), and scheduling one gathering with girlfriends by the end of this episode of care.   Status: Continued - Date: 12/31/2018    Intervention(s)  Therapist will assign homework healthy leisure/self-care planning; provide educational materials on what is self care; role-play conflict management; teach about healthy boundaries.      Client has reviewed and agreed to the above plan.      TRACY Fritz  12/31/2018    "

## 2019-01-01 ASSESSMENT — ANXIETY QUESTIONNAIRES: GAD7 TOTAL SCORE: 6

## 2019-01-04 ENCOUNTER — OFFICE VISIT (OUTPATIENT)
Dept: NEPHROLOGY | Facility: CLINIC | Age: 44
End: 2019-01-04
Attending: INTERNAL MEDICINE

## 2019-01-04 ENCOUNTER — APPOINTMENT (OUTPATIENT)
Dept: TRANSPLANT | Facility: CLINIC | Age: 44
End: 2019-01-04
Attending: TRANSPLANT SURGERY

## 2019-01-04 ENCOUNTER — ALLIED HEALTH/NURSE VISIT (OUTPATIENT)
Dept: TRANSPLANT | Facility: CLINIC | Age: 44
End: 2019-01-04
Attending: TRANSPLANT SURGERY

## 2019-01-04 ENCOUNTER — ANCILLARY PROCEDURE (OUTPATIENT)
Dept: GENERAL RADIOLOGY | Facility: CLINIC | Age: 44
End: 2019-01-04
Attending: INTERNAL MEDICINE
Payer: COMMERCIAL

## 2019-01-04 ENCOUNTER — ANCILLARY PROCEDURE (OUTPATIENT)
Dept: CT IMAGING | Facility: CLINIC | Age: 44
End: 2019-01-04
Attending: INTERNAL MEDICINE
Payer: COMMERCIAL

## 2019-01-04 ENCOUNTER — OFFICE VISIT (OUTPATIENT)
Dept: TRANSPLANT | Facility: CLINIC | Age: 44
End: 2019-01-04
Attending: TRANSPLANT SURGERY

## 2019-01-04 ENCOUNTER — INFUSION THERAPY VISIT (OUTPATIENT)
Dept: INFUSION THERAPY | Facility: CLINIC | Age: 44
End: 2019-01-04
Attending: INTERNAL MEDICINE

## 2019-01-04 VITALS
BODY MASS INDEX: 19.76 KG/M2 | HEART RATE: 83 BPM | DIASTOLIC BLOOD PRESSURE: 72 MMHG | WEIGHT: 125.9 LBS | TEMPERATURE: 97.7 F | SYSTOLIC BLOOD PRESSURE: 114 MMHG | HEIGHT: 67 IN | OXYGEN SATURATION: 99 %

## 2019-01-04 DIAGNOSIS — Z00.5 TRANSPLANT DONOR EVALUATION: ICD-10-CM

## 2019-01-04 DIAGNOSIS — Z00.5 TRANSPLANT DONOR EVALUATION: Primary | ICD-10-CM

## 2019-01-04 DIAGNOSIS — N20.0 NEPHROLITHIASIS: ICD-10-CM

## 2019-01-04 LAB
ABO + RH BLD: NORMAL
ABO + RH BLD: NORMAL
ALBUMIN SERPL-MCNC: 4.2 G/DL (ref 3.4–5)
ALBUMIN UR-MCNC: NEGATIVE MG/DL
ALP SERPL-CCNC: 44 U/L (ref 40–150)
ALT SERPL W P-5'-P-CCNC: 20 U/L (ref 0–50)
ANION GAP SERPL CALCULATED.3IONS-SCNC: 6 MMOL/L (ref 3–14)
APPEARANCE UR: CLEAR
APTT PPP: 32 SEC (ref 22–37)
AST SERPL W P-5'-P-CCNC: 14 U/L (ref 0–45)
B-HCG SERPL-ACNC: <1 IU/L (ref 0–5)
BACTERIA #/AREA URNS HPF: ABNORMAL /HPF
BILIRUB SERPL-MCNC: 0.5 MG/DL (ref 0.2–1.3)
BILIRUB UR QL STRIP: NEGATIVE
BLD GP AB SCN SERPL QL: NORMAL
BLOOD BANK CMNT PATIENT-IMP: NORMAL
BUN SERPL-MCNC: 8 MG/DL (ref 7–30)
CALCIUM SERPL-MCNC: 8.4 MG/DL (ref 8.5–10.1)
CHLORIDE SERPL-SCNC: 107 MMOL/L (ref 94–109)
CHOLEST SERPL-MCNC: 200 MG/DL
CMV IGG SERPL QL IA: >8 AI (ref 0–0.8)
CO2 SERPL-SCNC: 27 MMOL/L (ref 20–32)
COLOR UR AUTO: YELLOW
CREAT SERPL-MCNC: 0.62 MG/DL (ref 0.52–1.04)
CREAT UR-MCNC: 214 MG/DL
EBV VCA IGG SER QL IA: <0.2 AI (ref 0–0.8)
ERYTHROCYTE [DISTWIDTH] IN BLOOD BY AUTOMATED COUNT: 12.2 % (ref 10–15)
GFR SERPL CREATININE-BSD FRML MDRD: >90 ML/MIN/{1.73_M2}
GLUCOSE SERPL-MCNC: 86 MG/DL (ref 70–99)
GLUCOSE UR STRIP-MCNC: NEGATIVE MG/DL
HBA1C MFR BLD: 5.2 % (ref 0–5.6)
HBV CORE AB SERPL QL IA: NONREACTIVE
HBV SURFACE AB SERPL IA-ACNC: 1.22 M[IU]/ML
HBV SURFACE AG SERPL QL IA: NONREACTIVE
HCT VFR BLD AUTO: 39.7 % (ref 35–47)
HCV AB SERPL QL IA: NONREACTIVE
HDLC SERPL-MCNC: 65 MG/DL
HGB BLD-MCNC: 13.4 G/DL (ref 11.7–15.7)
HGB UR QL STRIP: ABNORMAL
HIV 1+2 AB+HIV1 P24 AG SERPL QL IA: NONREACTIVE
INR PPP: 1.1 (ref 0.86–1.14)
KETONES UR STRIP-MCNC: NEGATIVE MG/DL
LDLC SERPL CALC-MCNC: 115 MG/DL
LEUKOCYTE ESTERASE UR QL STRIP: NEGATIVE
MCH RBC QN AUTO: 30.2 PG (ref 26.5–33)
MCHC RBC AUTO-ENTMCNC: 33.8 G/DL (ref 31.5–36.5)
MCV RBC AUTO: 89 FL (ref 78–100)
MICROALBUMIN UR-MCNC: 14 MG/L
MICROALBUMIN/CREAT UR: 6.31 MG/G CR (ref 0–25)
MUCOUS THREADS #/AREA URNS LPF: PRESENT /LPF
NITRATE UR QL: NEGATIVE
NONHDLC SERPL-MCNC: 135 MG/DL
PH UR STRIP: 5 PH (ref 5–7)
PHOSPHATE SERPL-MCNC: 3.2 MG/DL (ref 2.5–4.5)
PLATELET # BLD AUTO: 223 10E9/L (ref 150–450)
POTASSIUM SERPL-SCNC: 3.6 MMOL/L (ref 3.4–5.3)
PROT SERPL-MCNC: 7.3 G/DL (ref 6.8–8.8)
PROT UR-MCNC: 0.1 G/L
PROT/CREAT 24H UR: 0.05 G/G CR (ref 0–0.2)
RBC # BLD AUTO: 4.44 10E12/L (ref 3.8–5.2)
RBC #/AREA URNS AUTO: 2 /HPF (ref 0–2)
SODIUM SERPL-SCNC: 140 MMOL/L (ref 133–144)
SOURCE: ABNORMAL
SP GR UR STRIP: 1.01 (ref 1–1.03)
SPECIMEN EXP DATE BLD: NORMAL
SQUAMOUS #/AREA URNS AUTO: 2 /HPF (ref 0–1)
T PALLIDUM AB SER QL: NONREACTIVE
TRIGL SERPL-MCNC: 99 MG/DL
URATE SERPL-MCNC: 3.2 MG/DL (ref 2.6–6)
UROBILINOGEN UR STRIP-MCNC: 0 MG/DL (ref 0–2)
WBC # BLD AUTO: 5.5 10E9/L (ref 4–11)
WBC #/AREA URNS AUTO: 1 /HPF (ref 0–5)

## 2019-01-04 PROCEDURE — 86480 TB TEST CELL IMMUN MEASURE: CPT | Performed by: INTERNAL MEDICINE

## 2019-01-04 PROCEDURE — 86704 HEP B CORE ANTIBODY TOTAL: CPT | Performed by: INTERNAL MEDICINE

## 2019-01-04 PROCEDURE — 84100 ASSAY OF PHOSPHORUS: CPT | Performed by: INTERNAL MEDICINE

## 2019-01-04 PROCEDURE — 87340 HEPATITIS B SURFACE AG IA: CPT | Performed by: INTERNAL MEDICINE

## 2019-01-04 PROCEDURE — 86850 RBC ANTIBODY SCREEN: CPT | Performed by: INTERNAL MEDICINE

## 2019-01-04 PROCEDURE — 85730 THROMBOPLASTIN TIME PARTIAL: CPT | Performed by: INTERNAL MEDICINE

## 2019-01-04 PROCEDURE — 36415 COLL VENOUS BLD VENIPUNCTURE: CPT | Performed by: INTERNAL MEDICINE

## 2019-01-04 PROCEDURE — 82542 COL CHROMOTOGRAPHY QUAL/QUAN: CPT | Performed by: INTERNAL MEDICINE

## 2019-01-04 PROCEDURE — G0463 HOSPITAL OUTPT CLINIC VISIT: HCPCS | Mod: ZF

## 2019-01-04 PROCEDURE — 40000866 ZZHCL STATISTIC HIV 1/2 ANTIGEN/ANTIBODY PRETRANSPLANT ONLY: Performed by: INTERNAL MEDICINE

## 2019-01-04 PROCEDURE — 86901 BLOOD TYPING SEROLOGIC RH(D): CPT | Performed by: INTERNAL MEDICINE

## 2019-01-04 PROCEDURE — 86706 HEP B SURFACE ANTIBODY: CPT | Performed by: INTERNAL MEDICINE

## 2019-01-04 PROCEDURE — 80061 LIPID PANEL: CPT | Performed by: INTERNAL MEDICINE

## 2019-01-04 PROCEDURE — 25000128 H RX IP 250 OP 636: Mod: JW,ZF | Performed by: INTERNAL MEDICINE

## 2019-01-04 PROCEDURE — 86665 EPSTEIN-BARR CAPSID VCA: CPT | Performed by: INTERNAL MEDICINE

## 2019-01-04 PROCEDURE — 83036 HEMOGLOBIN GLYCOSYLATED A1C: CPT | Performed by: INTERNAL MEDICINE

## 2019-01-04 PROCEDURE — 86803 HEPATITIS C AB TEST: CPT | Performed by: INTERNAL MEDICINE

## 2019-01-04 PROCEDURE — 86900 BLOOD TYPING SEROLOGIC ABO: CPT | Performed by: INTERNAL MEDICINE

## 2019-01-04 PROCEDURE — 80053 COMPREHEN METABOLIC PANEL: CPT | Performed by: INTERNAL MEDICINE

## 2019-01-04 PROCEDURE — 86780 TREPONEMA PALLIDUM: CPT | Performed by: INTERNAL MEDICINE

## 2019-01-04 PROCEDURE — 86644 CMV ANTIBODY: CPT | Performed by: INTERNAL MEDICINE

## 2019-01-04 PROCEDURE — 84550 ASSAY OF BLOOD/URIC ACID: CPT | Performed by: INTERNAL MEDICINE

## 2019-01-04 PROCEDURE — 85610 PROTHROMBIN TIME: CPT | Performed by: INTERNAL MEDICINE

## 2019-01-04 PROCEDURE — 84702 CHORIONIC GONADOTROPIN TEST: CPT | Performed by: INTERNAL MEDICINE

## 2019-01-04 PROCEDURE — 81001 URINALYSIS AUTO W/SCOPE: CPT | Performed by: INTERNAL MEDICINE

## 2019-01-04 PROCEDURE — 84156 ASSAY OF PROTEIN URINE: CPT | Performed by: INTERNAL MEDICINE

## 2019-01-04 PROCEDURE — 82043 UR ALBUMIN QUANTITATIVE: CPT | Performed by: INTERNAL MEDICINE

## 2019-01-04 PROCEDURE — 85027 COMPLETE CBC AUTOMATED: CPT | Performed by: INTERNAL MEDICINE

## 2019-01-04 RX ORDER — IOPAMIDOL 755 MG/ML
100 INJECTION, SOLUTION INTRAVASCULAR ONCE
Status: COMPLETED | OUTPATIENT
Start: 2019-01-04 | End: 2019-01-04

## 2019-01-04 RX ADMIN — IOPAMIDOL 100 ML: 755 INJECTION, SOLUTION INTRAVASCULAR at 13:42

## 2019-01-04 RX ADMIN — IOHEXOL 5 ML: 300 INJECTION, SOLUTION INTRAVENOUS at 08:20

## 2019-01-04 SDOH — HEALTH STABILITY: PHYSICAL HEALTH: ON AVERAGE, HOW MANY DAYS PER WEEK DO YOU ENGAGE IN MODERATE TO STRENUOUS EXERCISE (LIKE A BRISK WALK)?: 1 DAY

## 2019-01-04 SDOH — HEALTH STABILITY: MENTAL HEALTH
STRESS IS WHEN SOMEONE FEELS TENSE, NERVOUS, ANXIOUS, OR CAN'T SLEEP AT NIGHT BECAUSE THEIR MIND IS TROUBLED. HOW STRESSED ARE YOU?: VERY MUCH

## 2019-01-04 ASSESSMENT — MIFFLIN-ST. JEOR: SCORE: 1258.71

## 2019-01-04 ASSESSMENT — PAIN SCALES - GENERAL: PAINLEVEL: NO PAIN (0)

## 2019-01-04 NOTE — PROGRESS NOTES
Donor Iohexol test    Elly Ballard presents today to [facility] for a Donor Iohexol test.      Progress note:  ID verified by name and .     The following information was verified with the patient:  Female Patients is there any possibility of being pregnant No  Is there a history of allergy (skin rash, swelling, ect) to:   A.  Iodine (except skin reactions to betadine): No   B. Intravenous radio-contrast agents: No   C. Seafood No    R.N. provided patient with educational handout regarding timed test. Yes    20 gauge butterfly placed in Left AC and Iohexol administered over 2 minutes.  Positive blood return verified before and after injection. Butterfly removed.  20 gauge PIV placed in Right AC  for blood draws and CT this afternoon.    Medication administered:  Iohexol (Omnipaque 300mg iodine/ml concentration) 5 mls.    Start time: 824  Stop time: 826    Drug Waste Record    Drug Name: Iohexol  Dose: 5ml  Route administered: IV  NDC #: 0407-1413-10  Amount of waste(mL):5  Reason for waste: Single use vial    Administrations This Visit     iohexol (OMNIPAQUE 300) injection 5 mL     Admin Date  2019 Action  Given Dose  5 mL Route  Intravenous Administered By  Hailee Fernandez RN                    Evaluation nurse in transplant to draw labs at 2 and 4 hours post iohexol administration.  Patient given a slip with the times to get labs drawn and verbalized understanding of the plan.    Patient tolerated the procedure:  Yes    After the infusion patient was discharged to the next appointment.    Hailee Fernandez RN

## 2019-01-04 NOTE — LETTER
1/4/2019       RE: Elly Ballard  [address] [place] [address]     Dear Colleague,    Thank you for referring your patient, Elly Ballard, to the [facility]GAN TRANSPLANT at [facility]. Please see a copy of my visit note below.    ...    Again, thank you for allowing me to participate in the care of your patient.      Sincerely,    Pasha Ellis MD

## 2019-01-04 NOTE — DISCHARGE INSTRUCTIONS

## 2019-01-04 NOTE — NURSING NOTE
"Chief Complaint   Patient presents with     Transplant Donor Evaluation     direct kidney donor eval     Blood pressure 114/72, pulse 83, temperature 97.7  F (36.5  C), temperature source Oral, height 1.702 m (5' 7\"), weight 57.1 kg (125 lb 14.4 oz), SpO2 99 %.    ASHUTOSH MIRAMONTES CMA    "

## 2019-01-04 NOTE — PROGRESS NOTES
Psychosocial Evaluation  Living Organ Donation per OPTN Policy 14.1.A  Organ Type: related, kidney  Presenting Information:  Ms. Elly Ballard presents to the [facility] to complete a psychosocial evaluation since she is interested in becoming a kidney donor for her sister, Iris Ballard, age 60.  Ms. Ballard is a 43 year old , white, American, female.  She is in clinic today with her .  Ms. Carroll is a [place] Citizen.    PERSONAL BACKGROUND:  Current Living Situation: Ms. Ballard lives in a single family home with her  and her two daughters in [place], [place] which is a nearby [facility].    Education/Employment/Financial Situation: Ms. 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  Her  works full time as a respiratory/ECMO therapist for [employer].  Ms. Carroll denies that she would suffer a financial hardship as a result of living kidney donation.  Ms. Ballard's sister is a part of the NLDA study, so I did gather household size and income information.  Ms. Ballard household size is 4 and her annual household income is $130,000.    Health Insurance Status: Ms. Simmonss has health care insurance through her 's employer.    Family History: Ms. Ballard is .  She and her  have 2 daughters.      General Health: Ms. Ballard considers herself to be in good general health.  However, she does report that she has off an on issues with her GI system including constipation.  I encouraged her to discuss this with both the surgeon and the nephrologist.      Mental Health: Ms. Ballard reports that she does see a counselor regularly to help her with coping with stress and worry.  She tells me that her daughter, who is 17, was the victim of bullying at school and has had her own mental health  consequences as a result.  This has been very trying for Elly, so she sees a counselor, does yoga, and runs as ways to cope.  Her daughter is doing well in school now, but has lots of her own mental health needs.  Pete tells me that she and her  are certainly weighing this as she considers living kidney donation.  Ms. Ballard is not on any medications for her mental health and does not see a need for them at this time.  She denies any history of suicidal thinking, plans, or past attempts.  She denies any history of psychiatric hospitalizations.    Alcohol and Drug Use/Abuse/Dependency: Denies any past history of abuse or dependency on alcohol or illicit drugs. Denies any current use of street drugs, including marijuana.  Denies any past history of negative consequences of use of alcohol or drugs such as a DUI, relationship problems, or problems with work or home life.  Ms. Ballard reports that she has 2 to 4 servings of alcohol per week on average.     Cigarette Use: Ms. Ballard does not smoke cigarettes.    Legal: Ms. Ballard denies any legal issues.    Coping with major surgery/associated stress: Ms. Ballard reports that she renetta with stressors by talking things through with her , co-worker, and friends.  She denies concerns about her ability to handle major surgery.      Support System: Ms. Ballard reports that she has an extensive support system, including primary her , who is with her today and will be her primary support.    DONOR SPECIFIC INFORMATION:  Relationship to Recipient: Ms. Ballard wants to donate a kidney to her sister, Ms. Iris Ballard, age 60. They are blood related full siblings.    Decision Process/Motivation to Donate: Ms. Ballard reports that her sister's need for a kidney transplant is related to a congential condition.  She wants her sister to live a long, healthy life.  Ms. Ballard denies feeling any pressure or coercion to be a donor.  She  denies being offered any kind of payment to be a donor.    PREPARATION FOR DONATION, RECOVERY, AND POTENTIAL SHORT-LONG-TERM OUTCOMES:  Understanding of the Living Donation Process:   We discussed the role of the donor  and Independent Donor Advocate.  Short and long term medical and psychosocial risks to both, donor and recipient were reviewed and she is able to articulate these risks.  High risk behaviors as defined by [place] Public Health Services (PHS) 2013 that have potential to increase risk of disease transmission were reviewed and she denies any high risk behaviors. Post surgical restrictions (2 weeks no driving, 6 weeks no lifting over 10 lbs) were reviewed and she appears capable of adhering to the post surgical requirements. The need for a caregiver was discussed and she has her  to provide care for her.  The risk of poor psychosocial outcome including problems with body image, post-surgery depression or anxiety, or feelings of emotional distress or bereavement if recipient experiences any recurrent disease, poor outcome or death was reviewed.  Additionally, potential financial implications, including the risk of having difficulty obtaining health care insurance, life insurance, disability insurance, or long term care insurance were reviewed, as were available donor grants to assist with donor related expenses.      We also discussed some unique issues that arise with paired kidney donation, which include the uncertainty of the timing and the importance of having a employment situation and support system that is able to provide sustained support and flexibility.    Ms. Ballard appears capable of understanding this information and making an informed medical decision.    Impressions/Recommendations:   Ms. Elly Ballard  is highly motivated to donate kidney  to her sister, Iris Ballard, age 60.  Her decision to donate is free of inducement, coercion, or other undue pressure.   Her  housing, finances and employment are stable.  No current/active mental health or chemical abuse issues were identified.  The need for a caregiver was reviewed and she is able to identify a plan to meet her post operative care needs.  She appears capable of making an informed medical decision.  No psychosocial contraindications to living organ donation were identified and  I support Ms. Ballard s desire to donate a kidney to her sister, Iris Ballard, age 60.         Contact Information:   ROMELIA Manley, [facility]  Clinical  and Independent Donor Advocate  [facility]  Pager:  [phone]  Direct:  [phone]    Time Spent: 60 minutes      Living Kidney Donor Consent per OPTN Policy 14.3.A for Independent Living Donor Advocate (EDWIN)    Written assurance has been obtained from the potential donor that he/she:   Is willing to donate  Is free from inducement and coercion  Has been informed that the he/she may decline to donate at any time  Has been informed that transplant centers must:   A) Offer donors an opportunity to discontinue the donor consent or evaluation process in a way that is protected and confidential  B) Provide an independent living donor advocate (EDWIN) to assist the potential donor during this process    The following was presented to the potential donor in a language in which the potential donor is able to engage in meaningful dialogue:   Education and instruction about all phases of the living donation process including:   Consent  Medical and psychosocial evaluation  Information about the surgical procedure  Pre and post operative care  Benefits of post operative follow up  Disclosure that the recovery hospital will take all reasonable precautions to provide confidentiality for the donor/recipient  Disclosure that it is a federal crime for any person to knowingly acquire, obtain or otherwise transfer any human organ for valuable  consideration  Disclosure that the [facility] must provide an independent living donor advocate (EDWIN)  Disclosure that health information obtained during the evaluation is subject to the same regulations as all records and could reveal conditions that must be reported to local, state, or federal public health authorities  Disclosure that the [facility] is required to report living donor follow up information at 6 months, 1 year, and 2 years, and that the potential donor must commit to post operative follow up testing coordinated by the [facility]    Disclosure has been provided that these risks may be transient or permanent & include but are not limited to:  Potential psychosocial risks:  Problems with body image  Post-surgery depression or anxiety  Feelings of emotional distress or bereavement if recipient experiences any recurrent disease or in the event of the recipient s death  Impact of donation on the donor s lifestyle    Potential financial impacts:  Personal expenses of travel, housing, , lost wages related to donation might not be reimbursed. However, resources may be available to defray some donation-related expenses   Need for life-long follow up at the donor s expense  Loss of employment or income  Negative impact on the ability to obtain future employment  Negative impact on the ability to obtain, maintain, or afford health, disability, and life insurance  Future health problems experienced by living donors following donation may not be covered by the recipient s insurance    Contact Information:  ROMELIA Manley, [facility]  Clinical  and Independent Donor Advocate  [facility]  Pager:  [phone]  Direct:  [phone]      Time Spent: 60 minutes

## 2019-01-04 NOTE — PROGRESS NOTES
Assessment and Plan:  1. Prospective kidney transplant donor with a few issues that need to be addressed prior to donation. Patient's blood pressure is acceptable at this visit, kidney function appears to be acceptable with Iohexol pending, and urinalysis is bland.    A. History of renal stone, litholink was completed. Will need to review   B. Anxiety an depression, followed by a therapist and will need to make sure she is cleared for donation and to ensure close follow up after donation     Discussed the risks of donating a kidney, including the surgical risk and the possible risks of living with one kidney.    Education about expected post-donation kidney function and how chronic kidney disease (CKD) and end stage kidney disease (ESKD) might potentially impact the donor in the future, include, but not limited to:       - On average, donors will have 25-35% permanent loss of kidney function at donation.       - Baseline risk of ESKD may slightly exceed that of members of the general population with the same demographic profile.       - Donor risks must be interpreted in light of known epidemiology of both CKD or ESKD, such as that CKD generally develops in midlife (40-50 years old) and ESKD generally develops after age 60.       - Medical evaluation of young potential donors cannot predict lifetime risk of CKD or ESKD.       - Donors may be at higher risk for CKD if they sustain damage to the remaining kidney.       - Development of CKD and progression of ESKD may be more rapid with only 1 kidney.       - Some type of kidney replacement therapy, either kidney transplant or dialysis, is required when reaching ESKD.    Potential medical or surgical risks include, but not limited to:       - Death.       - Scars, pain, fatigue, and other consequences typical of any surgical procedure.       - Decreased kidney function.       - Abdominal or bowel symptoms, such as bloating and nausea, and developing bowel  obstruction.       - Kidney failure (ESKD) and the need for a kidney transplant or dialysis for the donor.       - Impact of obesity, hypertension, or other donor-specific medical conditions on morbidity and mortality of the potential donor.    Patients overall evaluation will be discussed with the transplant team and a final recommendation on the patients' suitability to be a kidney transplant donor will be made at that time.    Consult:  Elly Ballard was seen in consultation at the request of Dr. Pasha Ellis for evaluation as a potential kidney transplant donor.    Reason for Visit:  Elly Ballard is a 43 year old female who presents for a kidney donor evaluation.  Patient would like to donate to a Sister.    HPI:      Elly is here for evaluation as a donor.  She is interested in donation to her sister who was diagnosed with congenital renal disease and obstructive uropathy.  Elly feels generally healthy.  She has history of renal stones and she had completed a litho-link that will need to be reviewed.  She currently reports no symptoms regarding stones or other issues.  In 2016, she was hospitalized with UTI and her creatinine lakeisha to 0.8 mg/dL.  Subsequently, this had resolved.  She has situational depression for which she is followed by therapist and she attributes that to her ongoing stress due to her daughter's behavioral diagnoses.  She works part-time, she exercises at least once a week.  Her  is very supportive of her decision.           Kidney Disease Hx:       h/o Kidney Problems: No  Family h/o Genetic Kidney Disease: No       h/o HTN: No    Usual BP: 110/70       h/o Protein in Urine: No  h/o Blood in Urine: No       h/o Kidney Stones: Yes   h/o Kidney Injury: very mild in 2016 due to UTI       h/o Recurrent UTI: No  h/o Genitourinary Problems: No       h/o Chronic NSAID Use: No         Other Medical Hx:       h/o DM: No   h/o Gestational DM: No       h/o Preeclampsia:  No          h/o Gastrointestinal, Pancreas or Liver Problems: Yes: IBS questionable 2008       h/o Lung or Heart Problems: No       h/o Hematologic Problems: No  h/o Bleeding or Clotting Problems: No       h/o Cancer: No       h/o Infection Problems: No         Skin Cancer Risk:       h/o more than 50 moles: No       h/o extensive sun exposure: Yes        h/o melanoma: No       Family h/o melanoma: No         Mental Health Assessment:       h/o Depression: Yes: controlled not on medications situational        h/o Psychiatric Illness: No       h/o Suicidal Attempt(s): No    ROS:   A comprehensive review of systems was obtained and negative, except as noted in the HPI or PMH.  IBS related nausea     PMH:   History was taken from the patient as noted below.  Past Medical History:   Diagnosis Date     Anxiety     controlled not on medications     IBS (irritable bowel syndrome)        PSH:   Past Surgical History:   Procedure Laterality Date     NO HISTORY OF SURGERY       Personal or family history of anesthesia problems: No    Family Hx:   Family History   Problem Relation Age of Onset     Pulmonary Embolism Mother      Depression Mother      Anxiety Disorder Mother      Hypertension Mother      Family History Negative Father      Chronic Kidney Disease Sister         anatomical      Family History Negative Brother      Glaucoma No family hx of      Macular Degeneration No family hx of           Specific Family Hx:       FH of DM: Yes        FH of CAD: No  FH of HTN: Yes        FH of Cancer: prostate cancer   FH of Kidney Cancer: No    Personal Hx:   Social History     Socioeconomic History     Marital status:      Spouse name: Not on file     Number of children: Not on file     Years of education: Not on file     Highest education level: Not on file   Social Needs     Financial resource strain: Not on file     Food insecurity - worry: Not on file     Food insecurity - inability: Not on file     Transportation  "needs - medical: Not on file     Transportation needs - non-medical: Not on file   Occupational History     Comment:  for a non profit orgnization    Tobacco Use     Smoking status: Never Smoker     Smokeless tobacco: Never Used   Substance and Sexual Activity     Alcohol use: Yes     Comment: 3/week     Drug use: No     Sexual activity: Yes     Partners: Male   Other Topics Concern     Parent/sibling w/ CABG, MI or angioplasty before 65F 55M? Not Asked   Social History Narrative     Not on file          Specific Social Hx:       Health Insurance Status: Yes       Employment Status: Part time  Occupation: manages a P orgnization                       Living Arrangements: lives with their spouse       Social Support: Yes       Presence of increased risk for disease transmission behaviors as defined by PHS guidelines: No        Allergies:  Allergies   Allergen Reactions     Seasonal Allergies        Medications:  Prior to Admission medications    Medication Sig Start Date End Date Taking? Authorizing Provider   cholecalciferol (VITAMIN D3) 26328 units capsule Take 1 capsule (50,000 Units) by mouth once a week 4/20/18   Quyen Faye MD       Vitals:  Vital Signs 1/4/2019 1/4/2019 1/4/2019   Systolic 101 108 114   Diastolic 63 71 72   Pulse 83 - -   Temperature 97.7 - -   Respirations - - -   Weight (LB) 125 lb 14.4 oz - -   Height 5' 7\" - -   BMI (Calculated) 19.76 - -   Pain - - -   O2 99 - -       Exam:   GENERAL APPEARANCE: alert and no distress  HENT: mouth without ulcers or lesions  LYMPHATICS: no cervical or supraclavicular nodes  RESP: lungs clear to auscultation - no rales, rhonchi or wheezes  CV: regular rhythm, normal rate, no rub, no murmur  EDEMA: no LE edema bilaterally  ABDOMEN: soft, nondistended, nontender, bowel sounds normal  MS: extremities normal - no gross deformities noted, no evidence of inflammation in joints, no muscle tenderness  SKIN: no rash    Results:   Labs and " imaging were ordered for this visit and reviewed by me.  Recent Results (from the past 336 hour(s))   Uric acid    Collection Time: 01/04/19  7:49 AM   Result Value Ref Range    Uric Acid 3.2 2.6 - 6.0 mg/dL   Partial thromboplastin time    Collection Time: 01/04/19  7:49 AM   Result Value Ref Range    PTT 32 22 - 37 sec   CBC with platelets    Collection Time: 01/04/19  7:49 AM   Result Value Ref Range    WBC 5.5 4.0 - 11.0 10e9/L    RBC Count 4.44 3.8 - 5.2 10e12/L    Hemoglobin 13.4 11.7 - 15.7 g/dL    Hematocrit 39.7 35.0 - 47.0 %    MCV 89 78 - 100 fl    MCH 30.2 26.5 - 33.0 pg    MCHC 33.8 31.5 - 36.5 g/dL    RDW 12.2 10.0 - 15.0 %    Platelet Count 223 150 - 450 10e9/L   INR    Collection Time: 01/04/19  7:49 AM   Result Value Ref Range    INR 1.10 0.86 - 1.14   Hemoglobin A1c    Collection Time: 01/04/19  7:49 AM   Result Value Ref Range    Hemoglobin A1C 5.2 0 - 5.6 %   Phosphorus    Collection Time: 01/04/19  7:49 AM   Result Value Ref Range    Phosphorus 3.2 2.5 - 4.5 mg/dL   HCG quantitative pregnancy    Collection Time: 01/04/19  7:49 AM   Result Value Ref Range    HCG Quantitative Serum <1 0 - 5 IU/L   Comprehensive metabolic panel    Collection Time: 01/04/19  7:49 AM   Result Value Ref Range    Sodium 140 133 - 144 mmol/L    Potassium 3.6 3.4 - 5.3 mmol/L    Chloride 107 94 - 109 mmol/L    Carbon Dioxide 27 20 - 32 mmol/L    Anion Gap 6 3 - 14 mmol/L    Glucose 86 70 - 99 mg/dL    Urea Nitrogen 8 7 - 30 mg/dL    Creatinine 0.62 0.52 - 1.04 mg/dL    GFR Estimate >90 >60 mL/min/[1.73_m2]    GFR Estimate If Black >90 >60 mL/min/[1.73_m2]    Calcium 8.4 (L) 8.5 - 10.1 mg/dL    Bilirubin Total 0.5 0.2 - 1.3 mg/dL    Albumin 4.2 3.4 - 5.0 g/dL    Protein Total 7.3 6.8 - 8.8 g/dL    Alkaline Phosphatase 44 40 - 150 U/L    ALT 20 0 - 50 U/L    AST 14 0 - 45 U/L   Lipid Profile    Collection Time: 01/04/19  7:49 AM   Result Value Ref Range    Cholesterol 200 (H) <200 mg/dL    Triglycerides 99 <150 mg/dL     HDL Cholesterol 65 >49 mg/dL    LDL Cholesterol Calculated 115 (H) <100 mg/dL    Non HDL Cholesterol 135 (H) <130 mg/dL   ABO/Rh type and screen    Collection Time: 01/04/19  7:50 AM   Result Value Ref Range    ABO O     RH(D) Pos     Antibody Screen Neg     Test Valid Only At          [facility]    Specimen Expires 01/07/2019    Protein  random urine with Creat Ratio    Collection Time: 01/04/19  7:59 AM   Result Value Ref Range    Protein Random Urine 0.10 g/L    Protein Total Urine g/gr Creatinine 0.05 0 - 0.2 g/g Cr   Albumin Random Urine Quantitative with Creat Ratio    Collection Time: 01/04/19  7:59 AM   Result Value Ref Range    Creatinine Urine 214 mg/dL    Albumin Urine mg/L 14 mg/L    Albumin Urine mg/g Cr 6.31 0 - 25 mg/g Cr   Routine UA with microscopic    Collection Time: 01/04/19  7:59 AM   Result Value Ref Range    Color Urine Yellow     Appearance Urine Clear     Glucose Urine Negative NEG^Negative mg/dL    Bilirubin Urine Negative NEG^Negative    Ketones Urine Negative NEG^Negative mg/dL    Specific Gravity Urine 1.015 1.003 - 1.035    Blood Urine Moderate (A) NEG^Negative    pH Urine 5.0 5.0 - 7.0 pH    Protein Albumin Urine Negative NEG^Negative mg/dL    Urobilinogen mg/dL 0.0 0.0 - 2.0 mg/dL    Nitrite Urine Negative NEG^Negative    Leukocyte Esterase Urine Negative NEG^Negative    Source Midstream Urine     WBC Urine 1 0 - 5 /HPF    RBC Urine 2 0 - 2 /HPF    Bacteria Urine Few (A) NEG^Negative /HPF    Squamous Epithelial /HPF Urine 2 (H) 0 - 1 /HPF    Mucous Urine Present (A) NEG^Negative /LPF

## 2019-01-04 NOTE — LETTER
1/4/2019      RE: Elly Ballard  [address] [place] [address]       ...    Pasha Ellis MD

## 2019-01-04 NOTE — PROGRESS NOTES
Outpatient MNT: Kidney Donor Evaluation    Current BMI: 19.6 (HT 67 in,  lbs/57 kg)  BMI is within criteria of <30 for kidney donation    8 Year Risk of Diabetes  </= 3%     Time Spent: 15 minutes  Visit Type: Initial  Referring Physician: Dr Delgado  Pt accompanied by: her , Fernando     Nutrition Assessment  Vitamins, Supplements, Pertinent Meds: none  Herbal Medicines/Supplements: none    Diet Recall  Breakfast Yogurt, string cheese, pastry    Lunch Leftovers or may get fast food 2x/week at most while at work    Dinner Tater tot hot dish, walleye with rice    Snacks Nuts, fruit, cheese, sweets, veggies with dip    Beverages Hot tea, 3-4 c coffee/day, occasional soda/juice, some water    Alcohol 1-2 drinks/week    Dining out 3x/week at most      Physical Activity  Runs or does yoga 1x/week      Anthropometrics  Height:   67 in   BMI:    19.6    Weight Status:Normal BMI   Weight:  125 lbs            IBW (lb): 135  % IBW: 93    Wt Hx: No major changes     Adj/dosing BW: 125 lbs/57 kg       Labs  Recent Labs   Lab Test 01/04/19  0749 04/19/18  1040   CHOL 200* 160   HDL 65 60   * 88   TRIG 99 61       FBG = 86  A1C = 5.2  BP = wnl/low end     Prediction of Incident Diabetes Mellitus in Middle-aged Adults: The [place] Offspring Study  Orville Saldaña MD; James B. Meigs, MD, MPH; Enedelia Campo, PhD; Brenda Heard MD, MPH; Demetrio Braga MD; Giuseppe Torrez Sr,   PhD  Pt's estimated risk for T2DM (per Table 6 above)  Pt received points for the following criteria: none  Total points: 0  8-Year risk of T2DM: </= 3%    Estimated Nutrition Needs  Energy  0837-9081    (25-30 kcal/kg for maintenance)     Protein  46-57    (0.8-1 g/kg for maintenance)           Fluid  1 ml/kcal or per MD     Nutrition Diagnosis  Food and nutrition related knowledge deficit r/t pre transplant donor eval pt AEB pt verbalized not hearing pre/post transplant diet guidelines.    Nutrition Intervention  Nutrition  education provided:  Reviewed overall healthy diet guidelines for pre and post kidney donation. Discussed maintenance of a healthy weight and Na+ intake <3000 mg/day (<2000 mg/day if HTN).    Avoid the following post op d/t unknown effects on the organs:  - Herbal, Chinese, holistic, chiropractic, natural, alternative medicines and supplements  - Detoxes and cleanses  - Weight loss pills  - Protein powders or other products with extracts or herbs (ie green tea extract)    Patient Understanding: Pt verbalized understanding of education provided.  Expected Compliance: Good  Follow-Up Plans: PRN     Nutrition Goals  Pt to verbalize understanding of education provided     Provided pt with contact info.   Sia Blandon RD, LD  [facility] [phone]

## 2019-01-07 LAB
GAMMA INTERFERON BACKGROUND BLD IA-ACNC: 0.03 IU/ML
M TB IFN-G BLD-IMP: NEGATIVE
M TB IFN-G CD4+ BCKGRND COR BLD-ACNC: 9.29 IU/ML
MITOGEN IGNF BCKGRD COR BLD-ACNC: 0 IU/ML
MITOGEN IGNF BCKGRD COR BLD-ACNC: 0 IU/ML

## 2019-01-08 LAB
BSA: 1.64 M2
INTERPRETATION ECG - MUSE: NORMAL
IOHEXOL CL UR+SERPL-VRATE: 102 ML/MIN
IOHEXOL CL UR+SERPL-VRATE: 107 /1.73 M2
IOHEXOL CL UR+SERPL-VRATE: 3.81 MG/DL
IOHEXOL CL UR+SERPL-VRATE: 7.94 MG/DL

## 2019-01-09 ENCOUNTER — TELEPHONE (OUTPATIENT)
Dept: TRANSPLANT | Facility: CLINIC | Age: 44
End: 2019-01-09

## 2019-01-09 ENCOUNTER — COMMITTEE REVIEW (OUTPATIENT)
Dept: TRANSPLANT | Facility: CLINIC | Age: 44
End: 2019-01-09

## 2019-01-09 ENCOUNTER — ANCILLARY PROCEDURE (OUTPATIENT)
Dept: MAMMOGRAPHY | Facility: CLINIC | Age: 44
End: 2019-01-09

## 2019-01-09 DIAGNOSIS — Z00.5 TRANSPLANT DONOR EVALUATION: ICD-10-CM

## 2019-01-09 DIAGNOSIS — Z12.31 VISIT FOR SCREENING MAMMOGRAM: ICD-10-CM

## 2019-01-09 DIAGNOSIS — Z00.5 TRANSPLANT DONOR EVALUATION: Primary | ICD-10-CM

## 2019-01-09 NOTE — COMMITTEE REVIEW
Abdominal Committee Review Note     Evaluation Date:   Committee Review Date: 1/9/2019    Organ being evaluated for: Kidney    Transplant Phase:   Transplant Status:     Transplant Coordinator: No matching coordinators  Transplant Surgeon:       Referring Physician: Referred Self    Primary Diagnosis:   Secondary Diagnosis:     Committee Review Members:  Nephrology Thanh Tam MD, Roberto Guerrero MD   Nutrition Sia Blandon, RD   Pharmacy Eren Salcedo, [facility]    - Clinical Shital Hernandez, [facility], Michell Peterson, [facility]   Transplant Savannah Shawanda Troy, RN, Carolyn Lugo, NEYDA, Kristopher Delgado MD, Daisy Goodwin, LPN, Itzel Dickson, NEYDA, Radha Bauman MD       Transplant Eligibility: Acceptable Mental Health, UA, MRI, ultrasound,  review lithlinks    Committee Review Decision: Needs Re-presentation    Relative Contraindications: None    Absolute Contraindications: None    Committee Chair Radha Bauman MD verbally attested to the committee's decision.    Committee Discussion Details: I spoke to Elly about her donor evaluation dn we made a plan.  Repeat UA at [place].  Have MRI for liver lesions.  Have pelvic ultrasound with her own DrKeshav For pelvic congestion.  Have Dr. Guerrero look at litho links.   Elly has sotne in left kidney.  Left kidney to be donated. No echo for EKG.  I will ask schedulers to schedule MRI and repeat UA.  She was at the end of her period         Reviewed Elly's abnormal evaluation findings:     Needs  MRI for liver lesions,  Pelvic ultrasound for pelvic conjestion,  Repeat UA, Left kidney per Mitul MICHEL Reviewed and decision to use LEFT kidney for donation.       Next Steps:    Call patient to discuss results/reccomendations     If patient interested in continuing in donor eval, send appropriate orders/billing info

## 2019-01-09 NOTE — TELEPHONE ENCOUNTER
I spoke to Elly about her donor evaluation dn we made a plan.  Repeat UA at [place].  Ha have MRI for liver lesions.  Have pelvic ultrasound with her own DrKeshav For pelvic congestion.  Have Dr. Guerrero look at litho links.   Elly has sotne in left kidney.  Left kidney to be donated. No echo for EKG.  I will ask schedulers to schedule MRI and repeat UA.  She was at the end of her period.

## 2019-01-09 NOTE — COMMITTEE REVIEW
Abdominal Committee Review Note     Evaluation Date:   Committee Review Date: 1/9/2019    Organ being evaluated for: Kidney    Transplant Phase:   Transplant Status:     Transplant Coordinator: No matching coordinators  Transplant Surgeon:       Referring Physician: Referred Self    Primary Diagnosis:   Secondary Diagnosis:     Committee Review Members:  Nephrology Thanh Tam MD, Ced Guerrero MD   Nutrition Sia Blandon, RD   Pharmacy Eren Salcedo, [facility]    - Clinical Shital Hernandez, [facility], Michell Peterson, [facility]   Transplant Savannah Shawanda Troy, RN, Carolyn Lugo, NEYDA, Kristopher Delgado MD, Daisy Goodwin LPN, Itzel Dickson, NEYDA, Radha Bauman MD       Transplant Eligibility: Acceptable Mental Health, repeat UA left kidney stone, Liver MRI and pelvic ultasound    Committee Review Decision: Needs Re-presentation    Relative Contraindications: None    Absolute Contraindications: None    Committee Chair Radha Bauman MD verbally attested to the committee's decision.    Committee Discussion Details:      Reviewed Salvatore's abnormal evaluation findings:     REPEAT UA, PELVIC ULTRASOUND WITH OB, MRI HERE FOR LIVER LESIONS, REVIEW LITHO LINKS WITH CED       CT Reviewed and decision to use LEFT kidney for donation.       Next Steps:    Call patient to discuss results/reccomendations     If patient interested in continuing in donor eval, send appropriate orders/billing info

## 2019-01-10 ENCOUNTER — TELEPHONE (OUTPATIENT)
Dept: TRANSPLANT | Facility: CLINIC | Age: 44
End: 2019-01-10

## 2019-01-14 ENCOUNTER — TELEPHONE (OUTPATIENT)
Dept: FAMILY MEDICINE | Facility: CLINIC | Age: 44
End: 2019-01-14

## 2019-01-14 NOTE — TELEPHONE ENCOUNTER
Nika    this is ordered under PCP for F/U for abnormal findings    Pelvic congestion see the results on the CT ordered 1/4/19, copied part of the report  4. 1.5 cm mildly enhancing indeterminate hypodensity in the right  hepatic lobe which has slightly increased in size from 12/11/2016.  Findings likely represent an atypical hemangioma though are  indeterminant. Consider ultrasound or MRI for further evaluation.     I have personally reviewed the examination and initial interpretation  and I agree with the findings.     LAINEY VARGAS MD    Non urgent per transplant group, spoke with Savannah Lugo RN    US cued   Pt understands provider is out of clinic today and is expected to return to clinic tomorrow    Sindy Castellon RN   [facility]

## 2019-01-15 NOTE — TELEPHONE ENCOUNTER
patient has not sen AP in a couple of years   this should go to the provider who ordered the CT and  Or her current PCP

## 2019-01-15 NOTE — TELEPHONE ENCOUNTER
"I called patient back after calling transplant unit/ getting more time to review CT   in fact the ULTRASOUND requested is not an abdominal ULTRASOUND but the possible need for a pelvic ULTRASOUND  due to \" pelvic congestion\"  I called , patient  She will either see her PCP ( Dr Faye ) or an GYN for a pelvic exam and see if a pelvic ULTRASOUND is indicated  "

## 2019-01-15 NOTE — TELEPHONE ENCOUNTER
Route to provider [place]  balbir    See referral requested    Sindy Castellon, RN   [facility]

## 2019-01-16 ENCOUNTER — COMMITTEE REVIEW (OUTPATIENT)
Dept: TRANSPLANT | Facility: CLINIC | Age: 44
End: 2019-01-16

## 2019-01-16 ENCOUNTER — HOSPITAL ENCOUNTER (OUTPATIENT)
Dept: MRI IMAGING | Facility: CLINIC | Age: 44
Discharge: HOME | End: 2019-01-16
Admitting: SURGERY

## 2019-01-16 DIAGNOSIS — Z00.5 TRANSPLANT DONOR EVALUATION: ICD-10-CM

## 2019-01-16 LAB
ALBUMIN UR-MCNC: NEGATIVE MG/DL
APPEARANCE UR: CLEAR
BILIRUB UR QL STRIP: NEGATIVE
COLOR UR AUTO: NORMAL
GLUCOSE UR STRIP-MCNC: NEGATIVE MG/DL
HGB UR QL STRIP: NEGATIVE
KETONES UR STRIP-MCNC: NEGATIVE MG/DL
LEUKOCYTE ESTERASE UR QL STRIP: NEGATIVE
NITRATE UR QL: NEGATIVE
PH UR STRIP: 7 PH (ref 5–7)
RBC #/AREA URNS AUTO: <1 /HPF (ref 0–2)
SOURCE: NORMAL
SP GR UR STRIP: 1 (ref 1–1.03)
UROBILINOGEN UR STRIP-MCNC: NORMAL MG/DL (ref 0–2)
WBC #/AREA URNS AUTO: 0 /HPF (ref 0–5)

## 2019-01-16 PROCEDURE — 81001 URINALYSIS AUTO W/SCOPE: CPT | Performed by: SURGERY

## 2019-01-16 PROCEDURE — 25000128 H RX IP 250 OP 636: Performed by: SURGERY

## 2019-01-16 PROCEDURE — 74183 MRI ABD W/O CNTR FLWD CNTR: CPT

## 2019-01-16 PROCEDURE — A9581 GADOXETATE DISODIUM INJ: HCPCS | Performed by: SURGERY

## 2019-01-16 PROCEDURE — 25500064 ZZH RX 255 OP 636: Performed by: SURGERY

## 2019-01-16 RX ADMIN — GADOXETATE DISODIUM 2 ML: 181.43 INJECTION, SOLUTION INTRAVENOUS at 08:05

## 2019-01-16 RX ADMIN — GADOXETATE DISODIUM 10 ML: 181.43 INJECTION, SOLUTION INTRAVENOUS at 08:05

## 2019-01-16 RX ADMIN — SODIUM CHLORIDE 40 ML: 9 INJECTION, SOLUTION INTRAVENOUS at 08:05

## 2019-01-16 NOTE — COMMITTEE REVIEW
Abdominal Committee Review Note     Evaluation Date:   Committee Review Date: 1/16/2019    Organ being evaluated for: Kidney    Transplant Phase:   Transplant Status:     Transplant Coordinator: No matching coordinators  Transplant Surgeon:       Referring Physician: Referred Self    Primary Diagnosis:   Secondary Diagnosis:     Committee Review Members:  Nephrology Thanh Tam MD, Viral Deangelo Waldrop MD   Nutrition Sia Blandon, RD   Pharmacy Eren Salcedo, [facility]    - Clinical Shital Hernandez, [facility], Michell Peterson, [facility]   Transplant Savannah Shawanda Troy, RN, Carolyn Lugo, RN, Daisy Goodwin LPN, Itzel Dickson, NEYDA, Radha Bauman MD, Yuval Ramirez MD       Transplant Eligibility: Acceptable Mental Health, review MRI    Committee Review Decision: Needs Re-presentation    Relative Contraindications: None    Absolute Contraindications: None    Committee Chair Radha Bauman MD verbally attested to the committee's decision.    Committee Discussion Details:      Reviewed Elly's abnormal evaluation findings:        Also of note: Litho link approved by committee.  Left kidney to be donated due to stone.  Need neg MRI and UA results and then can be approved.  Will send Virtual crossmatch. NEED PELVIC ULTRASOUND  NEG URINE 1-16-19     CT Reviewed and decision to use RIGHT KIDNEY WITH STONE kidney for donation.       Next Steps:    Call patient to discuss results/reccomendations     If patient interested in continuing in donor eval, send appropriate orders/billing info

## 2019-01-21 ENCOUNTER — OFFICE VISIT (OUTPATIENT)
Dept: FAMILY MEDICINE | Facility: CLINIC | Age: 44
End: 2019-01-21
Payer: COMMERCIAL

## 2019-01-21 VITALS
BODY MASS INDEX: 19.72 KG/M2 | RESPIRATION RATE: 16 BRPM | HEART RATE: 64 BPM | SYSTOLIC BLOOD PRESSURE: 94 MMHG | OXYGEN SATURATION: 100 % | WEIGHT: 125.9 LBS | DIASTOLIC BLOOD PRESSURE: 60 MMHG | TEMPERATURE: 98.5 F

## 2019-01-21 DIAGNOSIS — R14.0 BLOATED ABDOMEN: Primary | ICD-10-CM

## 2019-01-21 DIAGNOSIS — Z00.5 TRANSPLANT DONOR EVALUATION: ICD-10-CM

## 2019-01-21 DIAGNOSIS — N81.6 RECTOCELE: ICD-10-CM

## 2019-01-21 PROCEDURE — 99213 OFFICE O/P EST LOW 20 MIN: CPT | Performed by: NURSE PRACTITIONER

## 2019-01-21 NOTE — PATIENT INSTRUCTIONS
Patient Education     Pelvic Organ Prolapse  Pelvic organ prolapse is when 1 or more organs inside the pelvis slip from their normal places. The pelvis is found between the waist and thighs. Normally, muscles and tissues in the pelvic region support the pelvic organs and hold them in place.  What is a normal pelvis?     Cutaway view of pelvis showing the small intestine, bladder, pubic bone, urethra, pelvic floor muscles, uterus, vagina, and rectum.   A. The small intestine absorbs nutrients from food.  B. The bladder collects and holds urine.  C. The pubic bone helps protect the pelvic organs.  D. The urethra is the tube that carries urine out of the body.  E. The pelvic floor muscles support organs and other structures in the pelvis.  F. The uterus is where the baby develops when a women is pregnant.  G. The vagina is the canal from the uterus to the outside of the body.  H. The rectum stores stool until a bowel movement occurs.  What causes pelvic organ prolapse?   There are several causes of pelvic organ prolapse including:    Vaginal childbirth    Hereditary (genetic) factors    Connective tissue disorders    Getting older    Constant coughing (such as with bronchitis or smoking)    Heavy lifting    Chronic straining (such as with constipation)    Being overweight  What are the symptoms of pelvic organ prolapse?  The symptoms of pelvic organ prolapse include:    A feeling of fullness or pressure in your pelvis    A sense that a ball or lump is sticking out from the vagina    Problems passing urine or having a bowel movement    Urine leakage when you cough or use stairs. (But this can happen even without prolapse.)     Pain or pressure in your low back    Pain when having sex  Date Last Reviewed: 6/1/2017 2000-2018 [employer]. [address] [place] [address], [place] [address]. All rights reserved. This information is not intended as a substitute for professional medical care. Always follow your  healthcare professional's instructions.

## 2019-01-21 NOTE — PROGRESS NOTES
SUBJECTIVE:   Elly Ballard is a 43 year old female who presents to clinic today for the following health issues:    In the process of becoming a kidney donor for her sister they said her lady parts look congested. Possible fibroids and cystic ovaries. They wanted it investigated further. Pt no history that she is aware of, never had imaging. Does feel bloated often, no pain but also has difficulty with bowel movements at times. Not really monitoring fiber and water intake, her sister is hoping to avoid dialysis so she would like this eval done asap.     Problem list and histories reviewed & adjusted, as indicated.  Additional history: as documented    Patient Active Problem List   Diagnosis     Major depressive disorder, recurrent, moderate (H)     Generalized anxiety disorder     Trauma and stressor-related disorder     Transplant donor evaluation     Past Surgical History:   Procedure Laterality Date     NO HISTORY OF SURGERY         Social History     Tobacco Use     Smoking status: Never Smoker     Smokeless tobacco: Never Used   Substance Use Topics     Alcohol use: Yes     Comment: 3/week     Family History   Problem Relation Age of Onset     Pulmonary Embolism Mother      Depression Mother      Anxiety Disorder Mother      Hypertension Mother      Family History Negative Father      Chronic Kidney Disease Sister         anatomical      Family History Negative Brother      Glaucoma No family hx of      Macular Degeneration No family hx of          Current Outpatient Medications   Medication Sig Dispense Refill     cholecalciferol (VITAMIN D3) 83673 units capsule Take 1 capsule (50,000 Units) by mouth once a week (Patient not taking: Reported on 1/21/2019) 8 capsule 0     Allergies   Allergen Reactions     Seasonal Allergies      Recent Labs   Lab Test 01/04/19  0749 11/20/18  0859 04/19/18  1040 12/11/16  1246   A1C 5.2  --   --   --    *  --  88  --    HDL 65  --  60  --    TRIG 99  --  61  --     ALT 20  --   --  16   CR 0.62 0.66  --  0.80   GFRESTIMATED >90 >90  --  78   GFRESTBLACK >90 >90  --  >90  African American GFR Calc     POTASSIUM 3.6  --   --  3.5   TSH  --   --  3.38  --       BP Readings from Last 3 Encounters:   01/21/19 94/60   01/04/19 114/72   11/20/18 96/60    Wt Readings from Last 3 Encounters:   01/21/19 57.1 kg (125 lb 14.4 oz)   01/04/19 57.1 kg (125 lb 14.4 oz)   11/20/18 56.7 kg (125 lb)                  Labs reviewed in EPIC    Reviewed and updated as needed this visit by clinical staff  Tobacco  Allergies  Meds       Reviewed and updated as needed this visit by Provider         ROS:  Constitutional, HEENT, cardiovascular, pulmonary, gi and gu systems are negative, except as otherwise noted.    OBJECTIVE:     BP 94/60   Pulse 64   Temp 98.5  F (36.9  C) (Temporal)   Resp 16   Wt 57.1 kg (125 lb 14.4 oz)   SpO2 100%   BMI 19.72 kg/m    Body mass index is 19.72 kg/m .  GENERAL: healthy, alert and no distress  RESP: Respiratory rate regular and breathing easily   CV: No abnormal color and extremities warm   ABDOMEN: soft, nontender, no hepatosplenomegaly, no masses and bowel sounds normal   (female): normal female external genitalia, normal urethral meatus , vaginal mucosa pink, moist, well rugated, normal cervix, adnexae, and uterus without masses. and rectocele present small vs scar tissue from old tear?  MS: no gross musculoskeletal defects noted, no edema  SKIN: no suspicious lesions or rashes  NEURO: Normal strength and tone, mentation intact and speech normal    Diagnostic Test Results:  Future     ASSESSMENT/PLAN:         ICD-10-CM    1. Bloated abdomen R14.0 US Transvaginal Non OB   2. Transplant donor evaluation Z00.5    3. Rectocele N81.6    rectocele present small vs scar tissue from old tear? Pt reports with her first child had bad tear she doesn't remember details. Work on fiber and water intake, stool softener or laxative prn   Will do us to rule out fibroids  and ovarian cysts per request of transplant team, pt work up for donor for sister, follow up with ObGyn    Patient Instructions       Patient Education     Pelvic Organ Prolapse  Pelvic organ prolapse is when 1 or more organs inside the pelvis slip from their normal places. The pelvis is found between the waist and thighs. Normally, muscles and tissues in the pelvic region support the pelvic organs and hold them in place.  What is a normal pelvis?     Cutaway view of pelvis showing the small intestine, bladder, pubic bone, urethra, pelvic floor muscles, uterus, vagina, and rectum.   A. The small intestine absorbs nutrients from food.  B. The bladder collects and holds urine.  C. The pubic bone helps protect the pelvic organs.  D. The urethra is the tube that carries urine out of the body.  E. The pelvic floor muscles support organs and other structures in the pelvis.  F. The uterus is where the baby develops when a women is pregnant.  G. The vagina is the canal from the uterus to the outside of the body.  H. The rectum stores stool until a bowel movement occurs.  What causes pelvic organ prolapse?   There are several causes of pelvic organ prolapse including:    Vaginal childbirth    Hereditary (genetic) factors    Connective tissue disorders    Getting older    Constant coughing (such as with bronchitis or smoking)    Heavy lifting    Chronic straining (such as with constipation)    Being overweight  What are the symptoms of pelvic organ prolapse?  The symptoms of pelvic organ prolapse include:    A feeling of fullness or pressure in your pelvis    A sense that a ball or lump is sticking out from the vagina    Problems passing urine or having a bowel movement    Urine leakage when you cough or use stairs. (But this can happen even without prolapse.)     Pain or pressure in your low back    Pain when having sex  Date Last Reviewed: 6/1/2017 2000-2018 [employer]. [address] [place]  [address]. All rights reserved. This information is not intended as a substitute for professional medical care. Always follow your healthcare professional's instructions.               HERMINIO Perrin [facility]

## 2019-01-21 NOTE — LETTER
My Depression Action Plan  Name: Elly Ballard   Date of Birth 1975  Date: 1/21/2019    My doctor: Nika Briscoe   My clinic: [address] [address] [phone]          GREEN    ZONE   Good Control    What it looks like:     Things are going generally well. You have normal up s and down s. You may even feel depressed from time to time, but bad moods usually last less than a day.   What you need to do:  1. Continue to care for yourself (see self care plan)  2. Check your depression survival kit and update it as needed  3. Follow your physician s recommendations including any medication.  4. Do not stop taking medication unless you consult with your physician first.           YELLOW         ZONE Getting Worse    What it looks like:     Depression is starting to interfere with your life.     It may be hard to get out of bed; you may be starting to isolate yourself from others.    Symptoms of depression are starting to last most all day and this has happened for several days.     You may have suicidal thoughts but they are not constant.   What you need to do:     1. Call your care team, your response to treatment will improve if you keep your care team informed of your progress. Yellow periods are signs an adjustment may need to be made.     2. Continue your self-care, even if you have to fake it!    3. Talk to someone in your support network    4. Open up your depression survival kit           RED    ZONE Medical Alert - Get Help    What it looks like:     Depression is seriously interfering with your life.     You may experience these or other symptoms: You can t get out of bed most days, can t work or engage in other necessary activities, you have trouble taking care of basic hygiene, or basic responsibilities, thoughts of suicide or death that will not go away, self-injurious behavior.     What you need to do:  1. Call  your care team and request a same-day appointment. If they are not available (weekends or after hours) call your local crisis line, emergency room or 911.            Depression Self Care Plan / Survival Kit    Self-Care for Depression  Here s the deal. Your body and mind are really not as separate as most people think.  What you do and think affects how you feel and how you feel influences what you do and think. This means if you do things that people who feel good do, it will help you feel better.  Sometimes this is all it takes.  There is also a place for medication and therapy depending on how severe your depression is, so be sure to consult with your medical provider and/ or Behavioral Health Consultant if your symptoms are worsening or not improving.     In order to better manage my stress, I will:    Exercise  Get some form of exercise, every day. This will help reduce pain and release endorphins, the  feel good  chemicals in your brain. This is almost as good as taking antidepressants!  This is not the same as joining a gym and then never going! (they count on that by the way ) It can be as simple as just going for a walk or doing some gardening, anything that will get you moving.      Hygiene   Maintain good hygiene (Get out of bed in the morning, Make your bed, Brush your teeth, Take a shower, and Get dressed like you were going to work, even if you are unemployed).  If your clothes don't fit try to get ones that do.    Diet  I will strive to eat foods that are good for me, drink plenty of water, and avoid excessive sugar, caffeine, alcohol, and other mood-altering substances.  Some foods that are helpful in depression are: complex carbohydrates, B vitamins, flaxseed, fish or fish oil, fresh fruits and vegetables.    Psychotherapy  I agree to participate in Individual Therapy (if recommended).    Medication  If prescribed medications, I agree to take them.  Missing doses can result in serious side effects.   I understand that drinking alcohol, or other illicit drug use, may cause potential side effects.  I will not stop my medication abruptly without first discussing it with my provider.    Staying Connected With Others  I will stay in touch with my friends, family members, and my primary care provider/team.    Use your imagination  Be creative.  We all have a creative side; it doesn t matter if it s oil painting, sand castles, or mud pies! This will also kick up the endorphins.    Witness Beauty  (AKA stop and smell the roses) Take a look outside, even in mid-winter. Notice colors, textures. Watch the squirrels and birds.     Service to others  Be of service to others.  There is always someone else in need.  By helping others we can  get out of ourselves  and remember the really important things.  This also provides opportunities for practicing all the other parts of the program.    Humor  Laugh and be silly!  Adjust your TV habits for less news and crime-drama and more comedy.    Control your stress  Try breathing deep, massage therapy, biofeedback, and meditation. Find time to relax each day.     My support system    Clinic Contact:  Phone number:    Contact 1:  Phone number:    Contact 2:  Phone number:    [employer]/:  Phone number:    Therapist:  Phone number:    [facility] crisis center:    Phone number:    Other community support:  Phone number:

## 2019-01-23 ENCOUNTER — DOCUMENTATION ONLY (OUTPATIENT)
Dept: TRANSPLANT | Facility: CLINIC | Age: 44
End: 2019-01-23

## 2019-01-23 ENCOUNTER — COMMITTEE REVIEW (OUTPATIENT)
Dept: TRANSPLANT | Facility: CLINIC | Age: 44
End: 2019-01-23

## 2019-01-23 NOTE — PHARMACY-MEDICATION REGIMEN REVIEW
Pharmacy Living Kidney Donor Medication Evaluation     This patient is a 43 year old female being considered for living kidney donation. As part of the kidney pre-donation patient evaluation, pharmacy has screened this patient's electronic medical record for medication related concerns.    Assessment / Plan    No significant potential medication related issues are expected for this patient post surgery, based on the medical record medication list review.  Pharmacy will continue to participate in this patient's care throughout the surgery course.  Please contact pharmacy with any further medication related questions or concerns.       WASHINGTON Cam[place]illo.Ph.  [facility]  [phone] pager

## 2019-01-23 NOTE — COMMITTEE REVIEW
Abdominal Committee Review Note     Evaluation Date:   Committee Review Date: 1/23/2019    Organ being evaluated for: Kidney    Transplant Phase:   Transplant Status:     Transplant Coordinator: No matching coordinators  Transplant Surgeon:       Referring Physician: Referred Self    Primary Diagnosis:   Secondary Diagnosis:     Committee Review Members:  Nephrology Thanh Tam MD, Roberto Guerrero MD   Nutrition Sia Blandon,    Pharmacy Eren Salcedo, [facility]    - Clinical Shital Hernandez, [facility], Michell Peterson, [facility]   Transplant Carolyn Lugo, NEYDA, Daisy Goodwin LPN, Itzel Dickson, NEYDA, Radha Bauman MD, Yuval Ramirez MD       Transplant Eligibility: Acceptable Mental Health, pelvic ultrasound on 1-24 for pelvic conjestion    Committee Review Decision:     Relative Contraindications: Other    Absolute Contraindications: Other    Committee Chair Radha Bauman MD verbally attested to the committee's decision.    Committee Discussion Details:      Reviewed Elly's abnormal evaluation findings:        Also of note: Pelvic ultrasound on 1-24    NeedS Mammogram neg mamogram 1-2019    Neg pelvic ultrasound.  Donor is approved     CT Reviewed and decision to use LEFT kidney for donation.       Next Steps:    Call patient to discuss results/reccomendations     If patient interested in continuing in donor eval, send appropriate orders/billing info

## 2019-01-24 ENCOUNTER — HOSPITAL ENCOUNTER (OUTPATIENT)
Dept: ULTRASOUND IMAGING | Facility: CLINIC | Age: 44
Discharge: HOME | End: 2019-01-24
Admitting: NURSE PRACTITIONER
Payer: COMMERCIAL

## 2019-01-24 DIAGNOSIS — R14.0 BLOATED ABDOMEN: ICD-10-CM

## 2019-01-24 PROCEDURE — 76830 TRANSVAGINAL US NON-OB: CPT

## 2019-01-25 ENCOUNTER — TELEPHONE (OUTPATIENT)
Dept: TRANSPLANT | Facility: CLINIC | Age: 44
End: 2019-01-25

## 2019-01-25 DIAGNOSIS — Z00.5 TRANSPLANT DONOR EVALUATION: Primary | ICD-10-CM

## 2019-01-30 LAB
A* LOCUS: NORMAL
A*: NORMAL
ABTEST METHOD: NORMAL
B* LOCUS: NORMAL
B*: NORMAL
BW-1: NORMAL
BW-2: NORMAL
C* LOCUS: NORMAL
C*: NORMAL
DPA1* NMDP: NORMAL
DPA1*: NORMAL
DPB1* NMDP: NORMAL
DPB1*: NORMAL
DQA1*LOCUS: NORMAL
DQB1* LOCUS: NORMAL
DQB1*: NORMAL
DRB1* LOCUS: NORMAL
DRB1*: NORMAL
DRB5* LOCUS: NORMAL
DRSSO TEST METHOD: NORMAL

## 2019-02-11 ENCOUNTER — OFFICE VISIT (OUTPATIENT)
Dept: PSYCHOLOGY | Facility: CLINIC | Age: 44
End: 2019-02-11
Payer: COMMERCIAL

## 2019-02-11 DIAGNOSIS — F43.9 TRAUMA AND STRESSOR-RELATED DISORDER: ICD-10-CM

## 2019-02-11 DIAGNOSIS — F41.1 GENERALIZED ANXIETY DISORDER: ICD-10-CM

## 2019-02-11 DIAGNOSIS — F33.1 MAJOR DEPRESSIVE DISORDER, RECURRENT, MODERATE (H): Primary | ICD-10-CM

## 2019-02-11 PROCEDURE — 90834 PSYTX W PT 45 MINUTES: CPT | Performed by: COUNSELOR

## 2019-02-11 ASSESSMENT — PATIENT HEALTH QUESTIONNAIRE - PHQ9
SUM OF ALL RESPONSES TO PHQ QUESTIONS 1-9: 4
5. POOR APPETITE OR OVEREATING: SEVERAL DAYS

## 2019-02-11 ASSESSMENT — ANXIETY QUESTIONNAIRES
GAD7 TOTAL SCORE: 5
1. FEELING NERVOUS, ANXIOUS, OR ON EDGE: SEVERAL DAYS
IF YOU CHECKED OFF ANY PROBLEMS ON THIS QUESTIONNAIRE, HOW DIFFICULT HAVE THESE PROBLEMS MADE IT FOR YOU TO DO YOUR WORK, TAKE CARE OF THINGS AT HOME, OR GET ALONG WITH OTHER PEOPLE: SOMEWHAT DIFFICULT
2. NOT BEING ABLE TO STOP OR CONTROL WORRYING: SEVERAL DAYS
7. FEELING AFRAID AS IF SOMETHING AWFUL MIGHT HAPPEN: SEVERAL DAYS
5. BEING SO RESTLESS THAT IT IS HARD TO SIT STILL: NOT AT ALL
6. BECOMING EASILY ANNOYED OR IRRITABLE: NOT AT ALL
3. WORRYING TOO MUCH ABOUT DIFFERENT THINGS: SEVERAL DAYS

## 2019-02-11 NOTE — PROGRESS NOTES
"                                           Progress Note    Client Name: Elly Ballard  Date: 2/11/2019         Service Type: Individual      Session Start Time: 10:10a   Session End Time: 10:50a      Session Length: 40 minutes     Session #: 13     Attendees: Client attended alone    Treatment Plan Last Reviewed: 2/11/2019  PHQ-9 / JORDON-7 : 4 & 5       DATA      Progress Since Last Session (Related to Symptoms / Goals / Homework):   Symptoms: Stable, see Epic for PHQ 9 and JORDON 7 updates    Homework: Client will work to pull away from daughter's issues, \"prioritize\" herself - partially completed and continued. By next appt, client will work to \"ground in good habits\" before transplant procedure.      Episode of Care Goals: Some progress - ACTION (Actively working towards change); Intervened by reinforcing change plan / affirming steps taken     Current / Ongoing Stressors and Concerns:   Reported slow down with daughter's schedule and acknowledged need to do for leisure activities for herself during this time especially such she has been approved to be a transplant donor; ongoing concerns for oldest daughter - \"things with her are always going to be intense\"; acknowledged resiliency and skillfulness in daughter, but not trusting those around daughter as she has not had a strong social support network; reported one supportive and kind interaction with daughter's old teacher acknowledging wrongdoings in the past, but not totally trusting things to change - \"we are just scratching the surface\".      Treatment Objective(s) Addressed in This Session:    Client will practice emotion regulation skills at least 2x week. Client will learn 3 new anxiety management skills. Client will prioritize her \"own agenda\" (e.g., self care, personal/professional goals) by rejoining book club, completing 1 piece of writing, and scheduling one gathering with girlfriends by the end of this episode of care.      Intervention:   CBT: " "identify self-defeating thoughts and behaviors; challenge and replace with more adaptable thoughts; identify emotions and function of emotions; reinforce here and now living and proactive leisure planning; teach effective/proactive communication/conflict management skills, teach healthy modeling skills for self-advocacy; DBT: teach and reinforce interpersonal effectiveness and boundary setting; teach and reinforce effective communication and self care, teach self-validation skills as well as non-judgmental stance, teach perspective taking for interpersonal effectiveness and validation skills, teach PLEASE skills; MI: point out discrepancies; evoke change talk, challenge sustain talk, and gauge confidence for change; build on client's self-efficacy        ASSESSMENT: Current Emotional / Mental Status (status of significant symptoms):   Risk status (Self / Other harm or suicidal ideation)   Client denies current fears or concerns for personal safety.   Client denies current or recent suicidal ideation or behaviors.   Client denies current or recent homicidal ideation or behaviors.   Client denies current or recent self injurious behavior or ideation.   Client denies other safety concerns.   A safety and risk management plan has not been developed at this time, however client was given the after-hours number / 911 should there be a change in any of these risk factors.     Appearance:   Appropriate    Eye Contact:   Good    Psychomotor Behavior: Normal    Attitude:   Cooperative    Orientation:   All   Speech    Rate / Production: Normal     Volume:  Normal    Mood:    Normal Mild depression and anxiety   Affect:    Mood congruent    Thought Content:  Clear  Rumination    Thought Form:  Coherent  Logical  Circumstantial   Insight:    Good     Medication Review:   No current psychiatric medications prescribed - wants to work on mental health \"holistically\"      Medication Compliance:   NA     Changes in Health " Issues:   None reported     Chemical Use Review:   Substance Use: Chemical use reviewed, no active concerns identified      Tobacco Use: No current tobacco use     Collateral Reports Completed:   Not Applicable      PLAN: (Client Tasks / Therapist Tasks / Other)  Therapist will assign homework of skill practice; provide educational materials on emotion regulation; role-play effective communication skills; teach emotional recognition/identification. Continue to reinforce boundary setting and assertiveness skills. Reinforce coping skills for anxiety, self-care, and limit setting. Continue to teach healthy social leisure, communication, and strength building.         Wayne Proctor [facility]                                                         ________________________________________________________________________    Treatment Plan    Client's Name: Elly Ballard  YOB: 1975    Date: 12/31/2018    Diagnoses: PROVISIONAL 296.32 (F33.1) Major Depressive Disorder, Recurrent Episode, Moderate, 300.02 (F41.1) Generalized Anxiety Disorder, Unspecified Trauma/Stress; RULE OUT Posttraumatic Stress Disorder  Psychosocial & Contextual Factors: Limited social support, care giving for daughter with MI issues, both daughters are on the spectrum, some family stress  WHODAS: 21    Referral / Collaboration:  Referral to another professional/service is not indicated at this time.    Anticipated number of session or this episode of care: 12      MeasurableTreatment Goal(s) related to diagnosis / functional impairment(s)  Goal 1: Client will improved mood and trauma/stress related symptoms as evidenced by decreased score on PHQ 9 from 8 to 0.    I will know I've met my goal when I am more happy and can form positive memories.      Objective #A (Client Action)    Client will practice mindfulness and distraction skills to distance from stress/daughter's school stressors at least 2x week (pull back from daughter's  "issues).  Status: Updated - Date: 12/31/2018    Intervention(s)  Therapist will assign homework of skill practice; provide educational materials on emotion regulation; role-play effective communication skills; teach emotional recognition/identification.    Objective #B  Client will maintain healthy lifestyle behaviors at least 1x week (exercise/physical activity).  Status: Continued - Date: 12/31/2018    Intervention(s)  Therapist will assign homework of routine development; role-play conflict management; teach the client how to perform a behavioral chain analysis.    Goal 2: Client will better manage anxiety and trauma/stress related symptoms as evidenced by decreased score on JORDON from 6 to 0.    I will know I've met my goal when I feel like I'm not carrying unprocessed stressors.      Objective #A (Client Action)    Client will learn 3 new anxiety management skills.  Status: Continued - Date: 12/31/2018    Intervention(s)  Therapist will assign homework of skill practice; provide educational materials on anxiety management/relaxation exercises; role-play skills; teach distraction skills.    Objective #B  Client will prioritize her \"own agenda\" (e.g., self care, personal/professional goals) by rejoining book club, completing 1 piece of writing (WROTE ONE POEM FOR DAUGHTER'S BDAY), and scheduling one gathering with girlfriends by the end of this episode of care.   Status: Continued - Date: 12/31/2018    Intervention(s)  Therapist will assign homework healthy leisure/self-care planning; provide educational materials on what is self care; role-play conflict management; teach about healthy boundaries.      Client has reviewed and agreed to the above plan.      TRACY Fritz  12/31/2018  "

## 2019-02-12 ASSESSMENT — ANXIETY QUESTIONNAIRES: GAD7 TOTAL SCORE: 5

## 2019-03-04 ENCOUNTER — OFFICE VISIT (OUTPATIENT)
Dept: PSYCHOLOGY | Facility: CLINIC | Age: 44
End: 2019-03-04
Payer: COMMERCIAL

## 2019-03-04 DIAGNOSIS — F43.9 TRAUMA AND STRESSOR-RELATED DISORDER: ICD-10-CM

## 2019-03-04 DIAGNOSIS — F33.1 MAJOR DEPRESSIVE DISORDER, RECURRENT, MODERATE (H): ICD-10-CM

## 2019-03-04 DIAGNOSIS — F41.1 GENERALIZED ANXIETY DISORDER: Primary | ICD-10-CM

## 2019-03-04 PROCEDURE — 90834 PSYTX W PT 45 MINUTES: CPT | Performed by: COUNSELOR

## 2019-03-04 ASSESSMENT — PATIENT HEALTH QUESTIONNAIRE - PHQ9
SUM OF ALL RESPONSES TO PHQ QUESTIONS 1-9: 4
5. POOR APPETITE OR OVEREATING: SEVERAL DAYS

## 2019-03-04 ASSESSMENT — ANXIETY QUESTIONNAIRES
2. NOT BEING ABLE TO STOP OR CONTROL WORRYING: SEVERAL DAYS
5. BEING SO RESTLESS THAT IT IS HARD TO SIT STILL: SEVERAL DAYS
IF YOU CHECKED OFF ANY PROBLEMS ON THIS QUESTIONNAIRE, HOW DIFFICULT HAVE THESE PROBLEMS MADE IT FOR YOU TO DO YOUR WORK, TAKE CARE OF THINGS AT HOME, OR GET ALONG WITH OTHER PEOPLE: SOMEWHAT DIFFICULT
3. WORRYING TOO MUCH ABOUT DIFFERENT THINGS: SEVERAL DAYS
GAD7 TOTAL SCORE: 5
1. FEELING NERVOUS, ANXIOUS, OR ON EDGE: SEVERAL DAYS
6. BECOMING EASILY ANNOYED OR IRRITABLE: NOT AT ALL
7. FEELING AFRAID AS IF SOMETHING AWFUL MIGHT HAPPEN: NOT AT ALL

## 2019-03-04 NOTE — PROGRESS NOTES
"                                           Progress Note    Client Name: Elly Ballard  Date: 3/4/2019         Service Type: Individual   Video Visit: No     Session Start Time: 2:35p   Session End Time: 3:15p      Session Length: 40 minutes     Session #: 14     Attendees: Client attended alone    Treatment Plan Last Reviewed: 3/4/2019  PHQ-9 / JORDON-7 : 4 & 5       DATA  Interactive Complexity: No  Crisis: No       Progress Since Last Session (Related to Symptoms / Goals / Homework):   Symptoms: Stable, see Epic for PHQ 9 and JORDON 7 updates    Homework: Partially completed and continued - client will work to \"ground in good habits\" before transplant procedure.      Episode of Care Goals: Some progress - ACTION (Actively working towards change); Intervened by reinforcing change plan / affirming steps taken     Current / Ongoing Stressors and Concerns:   -Came to the realization that she needs to attend more to youngest daughter's needs, identified feeling guilty as a result, was hesitant to secure therapy support for daughter  -Acknowledged feeling stressed, but struggling to make time for self care, debated if she wanted to follow through with self care getaway, somewhat minimized the amount of support she is giving to [facility]     Treatment Objective(s) Addressed in This Session:    Client will practice emotion regulation skills at least 2x week. Client will learn 3 new anxiety management skills. Client will prioritize her \"own agenda\" (e.g., self care, personal/professional goals) by rejoining book club, completing 1 piece of writing, and scheduling one gathering with girlfriends by the end of this episode of care.      Intervention:   CBT: identify self-defeating thoughts and behaviors; challenge and replace with more adaptable thoughts; identify emotions and function of emotions; reinforce here and now living and proactive leisure planning; teach effective/proactive communication/conflict management skills, " "teach healthy modeling skills for self-advocacy; DBT: teach and reinforce interpersonal effectiveness and boundary setting; teach and reinforce effective communication and self care, teach self-validation skills as well as non-judgmental stance, teach perspective taking for interpersonal effectiveness and validation skills, teach PLEASE skills; MI: point out discrepancies; evoke change talk, challenge sustain talk, and gauge confidence for change; build on client's self-efficacy        ASSESSMENT: Current Emotional / Mental Status (status of significant symptoms):   Risk status (Self / Other harm or suicidal ideation)   Client denies current fears or concerns for personal safety.   Client denies current or recent suicidal ideation or behaviors.   Client denies current or recent homicidal ideation or behaviors.   Client denies current or recent self injurious behavior or ideation.   Client denies other safety concerns.   A safety and risk management plan has not been developed at this time, however client was given the after-hours number / 911 should there be a change in any of these risk factors.  Client reports there has been no change in risk factors since their last session.     Client reports there has been no change in protective factors since their last session.       Appearance:   Appropriate    Eye Contact:   Good    Psychomotor Behavior: Normal    Attitude:   Cooperative    Orientation:   All   Speech    Rate / Production: Normal     Volume:  Normal    Mood:    Normal Mild depression and anxiety   Affect:    Mood congruent    Thought Content:  Clear     Thought Form:  Coherent  Logical  Circumstantial   Insight:    Fair     Medication Review:   No current psychiatric medications prescribed - wants to work on mental health \"holistically\"      Medication Compliance:   NA     Changes in Health Issues:   None reported     Chemical Use Review:   Substance Use: Chemical use reviewed, no active concerns identified "      Tobacco Use: No current tobacco use     Collateral Reports Completed:   Not Applicable      Diagnoses:   Generalized Anxiety Disorder      PLAN: (Client Tasks / Therapist Tasks / Other)  Therapist will assign homework of skill practice; provide educational materials on emotion regulation; role-play effective communication skills; teach emotional recognition/identification. Continue to reinforce boundary setting and assertiveness skills. Reinforce coping skills for anxiety, self-care, and limit setting. Continue to teach healthy social leisure, communication, and strength building.     By next appt, client will follow through with self care getaway.        Wayne Proctor [facility]                                                         ________________________________________________________________________    Treatment Plan    Client's Name: Elly Ballard  YOB: 1975    Date: 12/31/2018    Diagnoses: PROVISIONAL 296.32 (F33.1) Major Depressive Disorder, Recurrent Episode, Moderate, 300.02 (F41.1) Generalized Anxiety Disorder, Unspecified Trauma/Stress; RULE OUT Posttraumatic Stress Disorder  Psychosocial & Contextual Factors: Limited social support, care giving for daughter with MI issues, both daughters are on the spectrum, some family stress  WHODAS: 21    Referral / Collaboration:  Referral to another professional/service is not indicated at this time.    Anticipated number of session or this episode of care: 12      MeasurableTreatment Goal(s) related to diagnosis / functional impairment(s)  Goal 1: Client will improved mood and trauma/stress related symptoms as evidenced by decreased score on PHQ 9 from 8 to 0.    I will know I've met my goal when I am more happy and can form positive memories.      Objective #A (Client Action)    Client will practice mindfulness and distraction skills to distance from stress/daughter's school stressors at least 2x week (pull back from daughter's  "issues).  Status: Updated - Date: 12/31/2018    Intervention(s)  Therapist will assign homework of skill practice; provide educational materials on emotion regulation; role-play effective communication skills; teach emotional recognition/identification.    Objective #B  Client will maintain healthy lifestyle behaviors at least 1x week (exercise/physical activity).  Status: Continued - Date: 12/31/2018    Intervention(s)  Therapist will assign homework of routine development; role-play conflict management; teach the client how to perform a behavioral chain analysis.    Goal 2: Client will better manage anxiety and trauma/stress related symptoms as evidenced by decreased score on JORDON from 6 to 0.    I will know I've met my goal when I feel like I'm not carrying unprocessed stressors.      Objective #A (Client Action)    Client will learn 3 new anxiety management skills.  Status: Continued - Date: 12/31/2018    Intervention(s)  Therapist will assign homework of skill practice; provide educational materials on anxiety management/relaxation exercises; role-play skills; teach distraction skills.    Objective #B  Client will prioritize her \"own agenda\" (e.g., self care, personal/professional goals) by rejoining book club, completing 1 piece of writing (WROTE ONE POEM FOR DAUGHTER'S BDAY), and scheduling one gathering with girlfriends by the end of this episode of care.   Status: Continued - Date: 12/31/2018    Intervention(s)  Therapist will assign homework healthy leisure/self-care planning; provide educational materials on what is self care; role-play conflict management; teach about healthy boundaries.      Client has reviewed and agreed to the above plan.      TRACY Fritz  12/31/2018  "

## 2019-03-05 ASSESSMENT — ANXIETY QUESTIONNAIRES: GAD7 TOTAL SCORE: 5

## 2019-03-14 ENCOUNTER — TELEPHONE (OUTPATIENT)
Dept: TRANSPLANT | Facility: CLINIC | Age: 44
End: 2019-03-14

## 2019-03-14 NOTE — TELEPHONE ENCOUNTER
Talked with Elly and set the date of April 16 for surgery and April 8th for for pre op with [facility].

## 2019-03-18 ENCOUNTER — OFFICE VISIT (OUTPATIENT)
Dept: PSYCHOLOGY | Facility: CLINIC | Age: 44
End: 2019-03-18
Payer: COMMERCIAL

## 2019-03-18 DIAGNOSIS — F33.1 MAJOR DEPRESSIVE DISORDER, RECURRENT, MODERATE (H): Primary | ICD-10-CM

## 2019-03-18 DIAGNOSIS — F43.9 TRAUMA AND STRESSOR-RELATED DISORDER: ICD-10-CM

## 2019-03-18 DIAGNOSIS — F41.1 GENERALIZED ANXIETY DISORDER: ICD-10-CM

## 2019-03-18 PROCEDURE — 90834 PSYTX W PT 45 MINUTES: CPT | Performed by: COUNSELOR

## 2019-03-18 ASSESSMENT — ANXIETY QUESTIONNAIRES
6. BECOMING EASILY ANNOYED OR IRRITABLE: NOT AT ALL
2. NOT BEING ABLE TO STOP OR CONTROL WORRYING: SEVERAL DAYS
7. FEELING AFRAID AS IF SOMETHING AWFUL MIGHT HAPPEN: NOT AT ALL
3. WORRYING TOO MUCH ABOUT DIFFERENT THINGS: SEVERAL DAYS
1. FEELING NERVOUS, ANXIOUS, OR ON EDGE: SEVERAL DAYS
5. BEING SO RESTLESS THAT IT IS HARD TO SIT STILL: NOT AT ALL
IF YOU CHECKED OFF ANY PROBLEMS ON THIS QUESTIONNAIRE, HOW DIFFICULT HAVE THESE PROBLEMS MADE IT FOR YOU TO DO YOUR WORK, TAKE CARE OF THINGS AT HOME, OR GET ALONG WITH OTHER PEOPLE: SOMEWHAT DIFFICULT
GAD7 TOTAL SCORE: 4

## 2019-03-18 ASSESSMENT — PATIENT HEALTH QUESTIONNAIRE - PHQ9
SUM OF ALL RESPONSES TO PHQ QUESTIONS 1-9: 2
5. POOR APPETITE OR OVEREATING: SEVERAL DAYS

## 2019-03-18 NOTE — PROGRESS NOTES
"                                           Progress Note    Client Name: Elly Ballard  Date: 3/18/2019         Service Type: Individual   Video Visit: No     Session Start Time: 10:05a   Session End Time: 10:50a      Session Length: 40 minutes     Session #: 15     Attendees: Client attended alone    Treatment Plan Last Reviewed: 3/18/2019  PHQ-9 / JORDON-7 : 2 & 4       DATA  Interactive Complexity: No  Crisis: No       Progress Since Last Session (Related to Symptoms / Goals / Homework):   Symptoms: Stable, see Epic for PHQ 9 and JORDON 7 updates    Homework: Completed - client will follow through with self care getaway      Episode of Care Goals: Some progress - ACTION (Actively working towards change); Intervened by reinforcing change plan / affirming steps taken     Current / Ongoing Stressors and Concerns:   -Transplant surgery scheduled for 4/16/19, continued to feel god about decision, but working to physically prepare herself and to line up logistics with , acknowledged needing to have a willingness to ask for help after surgery    -Reflected on oldest daughter's progress over the school year, noted appreciation for support from some of daughter's teachers, but continued to feel sad about daughter's lack of friends, overall reported feeling like daughter's lows are not as low, expressed feeling cautious about daughter's progress     Treatment Objective(s) Addressed in This Session:    Client will practice emotion regulation skills at least 2x week. Client will learn 3 new anxiety management skills. Client will prioritize her \"own agenda\" (e.g., self care, personal/professional goals) by rejoining book club, completing 1 piece of writing, and scheduling one gathering with girlfriends by the end of this episode of care.      Intervention:   CBT: identify self-defeating thoughts and behaviors; challenge and replace with more adaptable thoughts; identify emotions and function of emotions; reinforce here " "and now living and proactive leisure planning; teach effective/proactive communication/conflict management skills, teach healthy modeling skills for self-advocacy, teach proactive help seeking; DBT: teach and reinforce interpersonal effectiveness and boundary setting; teach and reinforce effective communication and self care, teach self-validation skills as well as non-judgmental stance, teach perspective taking for interpersonal effectiveness and validation skills, teach PLEASE skills; MI: point out discrepancies; evoke change talk, challenge sustain talk, and gauge confidence for change; build on client's self-efficacy        ASSESSMENT: Current Emotional / Mental Status (status of significant symptoms):   Risk status (Self / Other harm or suicidal ideation)   Client denies current fears or concerns for personal safety.   Client denies current or recent suicidal ideation or behaviors.   Client denies current or recent homicidal ideation or behaviors.   Client denies current or recent self injurious behavior or ideation.   Client denies other safety concerns.   A safety and risk management plan has not been developed at this time, however client was given the after-hours number / 911 should there be a change in any of these risk factors.  Client reports there has been no change in risk factors since their last session.     Client reports there has been no change in protective factors since their last session.       Appearance:   Appropriate    Eye Contact:   Good    Psychomotor Behavior: Normal    Attitude:   Cooperative    Orientation:   All   Speech    Rate / Production: Normal     Volume:  Normal    Mood:    Normal Mild anxiousness   Affect:    Mood congruent    Thought Content:  Clear     Thought Form:  Coherent  Logical  Goal oriented    Insight:    Fair     Medication Review:   No current psychiatric medications prescribed - wants to work on mental health \"holistically\"      Medication " Compliance:   NA     Changes in Health Issues:   None reported     Chemical Use Review:   Substance Use: Chemical use reviewed, no active concerns identified      Tobacco Use: No current tobacco use     Collateral Reports Completed:   Not Applicable      Diagnoses:   Generalized Anxiety Disorder      PLAN: (Client Tasks / Therapist Tasks / Other)  Therapist will assign homework of skill practice; provide educational materials on emotion regulation; role-play effective communication skills; teach emotional recognition/identification. Continue to reinforce boundary setting and assertiveness skills. Reinforce coping skills for anxiety, self-care, and limit setting. Continue to teach healthy social leisure, communication, and strength building.     By next appt, client will prep for transplant surgery - working planning, identifying needs and to dos for  to support her with.        Wayne Proctor, [facility]                                                         ________________________________________________________________________    Treatment Plan    Client's Name: Elly Ballard  YOB: 1975    Date: 12/31/2018    Diagnoses: UPDATE Unspecified Depression, 300.02 (F41.1) Generalized Anxiety Disorder, Unspecified Trauma/Stress; RULE OUT Posttraumatic Stress Disorder  Psychosocial & Contextual Factors: Limited social support, care giving for daughter with MI issues, both daughters are on the spectrum, some family stress  WHODAS: 21    Referral / Collaboration:  Referral to another professional/service is not indicated at this time.    Anticipated number of session or this episode of care: 12      MeasurableTreatment Goal(s) related to diagnosis / functional impairment(s)  Goal 1: Client will improved mood and trauma/stress related symptoms as evidenced by decreased score on PHQ 9 from 8 to 0.    I will know I've met my goal when I am more happy and can form positive memories.      Objective #A (Client  "Action)    Client will practice mindfulness and distraction skills to distance from stress/daughter's school stressors at least 2x week (pull back from daughter's issues).  Status: Updated - Date: 12/31/2018    Intervention(s)  Therapist will assign homework of skill practice; provide educational materials on emotion regulation; role-play effective communication skills; teach emotional recognition/identification.    Objective #B  Client will maintain healthy lifestyle behaviors at least 1x week (exercise/physical activity).  Status: Continued - Date: 12/31/2018    Intervention(s)  Therapist will assign homework of routine development; role-play conflict management; teach the client how to perform a behavioral chain analysis.    Goal 2: Client will better manage anxiety and trauma/stress related symptoms as evidenced by decreased score on JORDON from 6 to 0.    I will know I've met my goal when I feel like I'm not carrying unprocessed stressors.      Objective #A (Client Action)    Client will learn 3 new anxiety management skills.  Status: Continued - Date: 12/31/2018    Intervention(s)  Therapist will assign homework of skill practice; provide educational materials on anxiety management/relaxation exercises; role-play skills; teach distraction skills.    Objective #B  Client will prioritize her \"own agenda\" (e.g., self care, personal/professional goals) by rejoining book club, completing 1 piece of writing (WROTE ONE POEM FOR DAUGHTER'S BDAY), and scheduling one gathering with girlfriends by the end of this episode of care.   Status: Continued - Date: 12/31/2018    Intervention(s)  Therapist will assign homework healthy leisure/self-care planning; provide educational materials on what is self care; role-play conflict management; teach about healthy boundaries.      Client has reviewed and agreed to the above plan.      TRACY Fritz  12/31/2018  "

## 2019-03-19 ASSESSMENT — ANXIETY QUESTIONNAIRES: GAD7 TOTAL SCORE: 4

## 2019-03-22 ENCOUNTER — TELEPHONE (OUTPATIENT)
Dept: TRANSPLANT | Facility: CLINIC | Age: 44
End: 2019-03-22

## 2019-03-28 ENCOUNTER — DOCUMENTATION ONLY (OUTPATIENT)
Dept: TRANSPLANT | Facility: CLINIC | Age: 44
End: 2019-03-28

## 2019-04-05 DIAGNOSIS — Z00.5 TRANSPLANT DONOR EVALUATION: Primary | ICD-10-CM

## 2019-04-08 ENCOUNTER — OFFICE VISIT (OUTPATIENT)
Dept: TRANSPLANT | Facility: CLINIC | Age: 44
End: 2019-04-08
Attending: TRANSPLANT SURGERY

## 2019-04-08 ENCOUNTER — APPOINTMENT (OUTPATIENT)
Dept: TRANSPLANT | Facility: CLINIC | Age: 44
End: 2019-04-08
Attending: TRANSPLANT SURGERY

## 2019-04-08 ENCOUNTER — ALLIED HEALTH/NURSE VISIT (OUTPATIENT)
Dept: TRANSPLANT | Facility: CLINIC | Age: 44
End: 2019-04-08
Attending: TRANSPLANT SURGERY

## 2019-04-08 VITALS
TEMPERATURE: 98.2 F | HEART RATE: 64 BPM | BODY MASS INDEX: 20.11 KG/M2 | OXYGEN SATURATION: 99 % | SYSTOLIC BLOOD PRESSURE: 97 MMHG | HEIGHT: 67 IN | DIASTOLIC BLOOD PRESSURE: 62 MMHG | WEIGHT: 128.1 LBS

## 2019-04-08 DIAGNOSIS — Z00.5 TRANSPLANT DONOR EVALUATION: Primary | ICD-10-CM

## 2019-04-08 DIAGNOSIS — Z00.5 TRANSPLANT DONOR EVALUATION: ICD-10-CM

## 2019-04-08 DIAGNOSIS — Z52.4 ENCOUNTER FOR DONATION OF KIDNEY: Primary | ICD-10-CM

## 2019-04-08 LAB
ALBUMIN UR-MCNC: NEGATIVE MG/DL
APPEARANCE UR: CLEAR
B-HCG SERPL-ACNC: <1 IU/L (ref 0–5)
BILIRUB UR QL STRIP: NEGATIVE
COLOR UR AUTO: NORMAL
CREAT SERPL-MCNC: 0.59 MG/DL (ref 0.52–1.04)
GFR SERPL CREATININE-BSD FRML MDRD: >90 ML/MIN/{1.73_M2}
GLUCOSE UR STRIP-MCNC: NEGATIVE MG/DL
HBV CORE AB SERPL QL IA: NONREACTIVE
HBV SURFACE AG SERPL QL IA: NONREACTIVE
HCV AB SERPL QL IA: NONREACTIVE
HGB BLD-MCNC: 12.6 G/DL (ref 11.7–15.7)
HGB UR QL STRIP: NEGATIVE
HIV 1+2 AB+HIV1 P24 AG SERPL QL IA: NONREACTIVE
KETONES UR STRIP-MCNC: NEGATIVE MG/DL
LEUKOCYTE ESTERASE UR QL STRIP: NEGATIVE
NITRATE UR QL: NEGATIVE
PH UR STRIP: 7 PH (ref 5–7)
SOURCE: NORMAL
SP GR UR STRIP: 1 (ref 1–1.03)
UROBILINOGEN UR STRIP-MCNC: 0 MG/DL (ref 0–2)

## 2019-04-08 PROCEDURE — 86665 EPSTEIN-BARR CAPSID VCA: CPT | Performed by: TRANSPLANT SURGERY

## 2019-04-08 PROCEDURE — 36415 COLL VENOUS BLD VENIPUNCTURE: CPT | Performed by: TRANSPLANT SURGERY

## 2019-04-08 PROCEDURE — G0463 HOSPITAL OUTPT CLINIC VISIT: HCPCS | Mod: ZF

## 2019-04-08 PROCEDURE — 87798 DETECT AGENT NOS DNA AMP: CPT | Performed by: TRANSPLANT SURGERY

## 2019-04-08 PROCEDURE — 86803 HEPATITIS C AB TEST: CPT | Performed by: TRANSPLANT SURGERY

## 2019-04-08 PROCEDURE — 87521 HEPATITIS C PROBE&RVRS TRNSC: CPT | Performed by: TRANSPLANT SURGERY

## 2019-04-08 PROCEDURE — 87535 HIV-1 PROBE&REVERSE TRNSCRPJ: CPT | Performed by: TRANSPLANT SURGERY

## 2019-04-08 PROCEDURE — 86850 RBC ANTIBODY SCREEN: CPT | Performed by: TRANSPLANT SURGERY

## 2019-04-08 PROCEDURE — 86704 HEP B CORE ANTIBODY TOTAL: CPT | Performed by: TRANSPLANT SURGERY

## 2019-04-08 PROCEDURE — 81003 URINALYSIS AUTO W/O SCOPE: CPT | Performed by: TRANSPLANT SURGERY

## 2019-04-08 PROCEDURE — 86901 BLOOD TYPING SEROLOGIC RH(D): CPT | Performed by: TRANSPLANT SURGERY

## 2019-04-08 PROCEDURE — 87516 HEPATITIS B DNA AMP PROBE: CPT | Performed by: TRANSPLANT SURGERY

## 2019-04-08 PROCEDURE — 87340 HEPATITIS B SURFACE AG IA: CPT | Performed by: TRANSPLANT SURGERY

## 2019-04-08 PROCEDURE — 86900 BLOOD TYPING SEROLOGIC ABO: CPT | Performed by: TRANSPLANT SURGERY

## 2019-04-08 PROCEDURE — 82565 ASSAY OF CREATININE: CPT | Performed by: TRANSPLANT SURGERY

## 2019-04-08 PROCEDURE — 84702 CHORIONIC GONADOTROPIN TEST: CPT | Performed by: TRANSPLANT SURGERY

## 2019-04-08 PROCEDURE — 85018 HEMOGLOBIN: CPT | Performed by: TRANSPLANT SURGERY

## 2019-04-08 PROCEDURE — 40000866 ZZHCL STATISTIC HIV 1/2 ANTIGEN/ANTIBODY PRETRANSPLANT ONLY: Performed by: TRANSPLANT SURGERY

## 2019-04-08 ASSESSMENT — PAIN SCALES - GENERAL: PAINLEVEL: NO PAIN (0)

## 2019-04-08 ASSESSMENT — MIFFLIN-ST. JEOR: SCORE: 1268.69

## 2019-04-08 NOTE — LETTER
4/8/2019       RE: Elly Ballard  [address] [id number]     Dear Colleague,    Thank you for referring your patient, Elly Ballard, to the [facility] at [facility]. Please see a copy of my visit note below.    See surgeon     Again, thank you for allowing me to participate in the care of your patient.      Sincerely,    Lourdes Irizarry NP

## 2019-04-08 NOTE — NURSING NOTE
"Chief Complaint   Patient presents with     Pre-Op Exam     Day- 8 donor     Blood pressure 97/62, pulse 64, temperature 98.2  F (36.8  C), temperature source Oral, height 1.702 m (5' 7\"), weight 58.1 kg (128 lb 1.6 oz), SpO2 99 %.    Ryan Muro CMA on 4/8/2019 at 12:39 PM    "

## 2019-04-08 NOTE — NURSING NOTE
Chief Complaint   Patient presents with     Pre-Op Exam     Day - 8     Ryan Muro CMA on 4/8/2019 at 12:44 PM

## 2019-04-08 NOTE — LETTER
4/8/2019       RE: Elly Ballard  [address] [address]     Dear Colleague,    Thank you for referring your patient, Elly Ballard, to the [facility] SOLID ORGAN TRANSPLANT at [facility]. Please see a copy of my visit note below.    Transplant Surgery H&P                                                        HPI:                                                      Ms. Ballard is a 43 year old female who comes to clinic today for preop prior to planned laparoscopic kidney donation surgery. The patient was previously reviewed by the living donor multidisciplinary selection committee and found to be medically and psychosocially appropriate for voluntary kidney donation. The patient denies any feelings of being pressured to proceed with kidney donation.  Health events since donor evaluation: None    Special considerations:   Constipation: yes  PONV: no  History of Urinary retention? no  Significant neck/back/joint concerns for lateral decubitus positioning?: No  Anabaptist: No    MEDICAL HISTORY:                                                      Patient Active Problem List    Diagnosis Date Noted     Transplant donor evaluation 12/17/2018     Priority: Medium     Trauma and stressor-related disorder 06/13/2018     Priority: Medium     Major depressive disorder, recurrent, moderate (H) 06/07/2018     Priority: Medium     Generalized anxiety disorder 06/07/2018     Priority: Medium      Past Medical History:   Diagnosis Date     Anxiety     controlled not on medications     IBS (irritable bowel syndrome)      Past Surgical History:   Procedure Laterality Date     NO HISTORY OF SURGERY       Current Outpatient Medications   Medication Sig Dispense Refill     cholecalciferol (VITAMIN D3) 62504 units capsule Take 1 capsule (50,000 Units) by mouth once a week (Patient not taking: Reported on 1/21/2019) 8 capsule 0     OTC products: None, except as noted  above  Allergies   Allergen Reactions     Seasonal Allergies       Social History     Tobacco Use     Smoking status: Never Smoker     Smokeless tobacco: Never Used   Substance Use Topics     Alcohol use: Yes     Comment: 3/week     History   Drug Use No       REVIEW OF SYSTEMS:                                                    CONSTITUTIONAL: NEGATIVE for fever, chills, change in weight  INTEGUMENTARY/SKIN: NEGATIVE for worrisome rashes, moles or lesions  EYES: NEGATIVE for vision changes or irritation  ENT/MOUTH: NEGATIVE for ear, mouth and throat problems  RESP: NEGATIVE for significant cough or SOB  BREAST: NEGATIVE for masses, tenderness or discharge  CV: NEGATIVE for chest pain, palpitations or peripheral edema  GI: NEGATIVE for nausea, abdominal pain, heartburn, or change in bowel habits  : NEGATIVE for frequency, dysuria, or hematuria  MUSCULOSKELETAL: NEGATIVE for significant arthralgias or myalgia  NEURO: NEGATIVE for weakness, dizziness or paresthesias  ENDOCRINE: NEGATIVE for temperature intolerance, skin/hair changes  HEME: NEGATIVE for bleeding problems  PSYCHIATRIC: NEGATIVE for changes in mood or affect    EXAM:                                                    There were no vitals taken for this visit.    GENERAL APPEARANCE: healthy, alert and no distress     EYES: EOMI, PERRL     HENT: ear canals and TM's normal and nose and mouth without ulcers or lesions     NECK: no adenopathy, no asymmetry, masses, or scars and thyroid normal to palpation     RESP: lungs clear to auscultation - no rales, rhonchi or wheezes     CV: regular rates and rhythm, normal S1 S2, no S3 or S4 and no murmur, click or rub     ABDOMEN:  soft, nontender, no HSM or masses and bowel sounds normal     MS: extremities normal- no gross deformities noted, no evidence of inflammation in joints, FROM in all extremities.     SKIN: no suspicious lesions or rashes     NEURO: Normal strength and tone, sensory exam grossly normal,  mentation intact and speech normal     PSYCH: mentation appears normal. and affect normal/bright     LYMPHATICS: No cervical adenopathy    DIAGNOSTICS:                                                      EKG: appears normal, NSR, Right Bundle Branch Block  Chest XRay  Labs Resulted Today:   Results for orders placed or performed in visit on 04/08/19   Creatinine   Result Value Ref Range    Creatinine 0.59 0.52 - 1.04 mg/dL    GFR Estimate >90 >60 mL/min/[1.73_m2]    GFR Estimate If Black >90 >60 mL/min/[1.73_m2]   Hemoglobin   Result Value Ref Range    Hemoglobin 12.6 11.7 - 15.7 g/dL   HCG quantitative pregnancy   Result Value Ref Range    HCG Quantitative Serum <1 0 - 5 IU/L   UA reflex to Microscopic   Result Value Ref Range    Color Urine Straw     Appearance Urine Clear     Glucose Urine Negative NEG^Negative mg/dL    Bilirubin Urine Negative NEG^Negative    Ketones Urine Negative NEG^Negative mg/dL    Specific Gravity Urine 1.003 1.003 - 1.035    Blood Urine Negative NEG^Negative    pH Urine 7.0 5.0 - 7.0 pH    Protein Albumin Urine Negative NEG^Negative mg/dL    Urobilinogen mg/dL 0.0 0.0 - 2.0 mg/dL    Nitrite Urine Negative NEG^Negative    Leukocyte Esterase Urine Negative NEG^Negative    Source Midstream Urine    ABO/Rh type and screen   Result Value Ref Range    ABO PENDING     Antibody Screen PENDING     Test Valid Only At          [facility]    Specimen Expires 04/11/2019      Labs Drawn and in Process:   Unresulted Labs Ordered in the Past 30 Days of this Admission     Date and Time Order Name Status Description    4/8/2019 1054 EBV CAPSID ANTIBODY IGG In process     4/8/2019 1054 HLA FINAL CROSSMATCH DONOR In process     4/8/2019 1054 ABO/RH TYPE AND SCREEN In process     4/8/2019 1054 HEPATITIS B CORE ANTIBODY In process     4/8/2019 1054 HEPATITIS B SURFACE ANTIGEN In process     4/8/2019 1054 HEPATITIS C ANTIBODY In process     4/8/2019 1054 HIV  ANTIGEN ANTIBODY COMBO PRETRANSPLANT In process     4/8/2019 1054 HBV HCV HIV WNV BY HARSHIL In process         Recent Labs   Lab Test 04/08/19  1110 01/04/19  0749  04/19/18  1040 12/11/16  1246   HGB 12.6 13.4   < > 13.1 12.8   PLT  --  223  --  206 199   INR  --  1.10  --   --   --    NA  --  140  --   --  143   POTASSIUM  --  3.6  --   --  3.5   CR 0.59 0.62   < >  --  0.80   A1C  --  5.2  --   --   --     < > = values in this interval not displayed.      Assessment:                                                    Healthy voluntary kidney donor    There have been no significant intercurrent medical problems or change of intent in proceeding with kidney donation as scheduled on 4/16/2019.    1. Labs reviewed and within normal limits: No  2. EKG (1/4/2019): appears normal, NSR, RBBB  3. Paired Exchange case: No  4. ABO= PENDING  5. Laterality: left  6. Outstanding issues: final ABO and cross match    Plan:                                                      1. Consent: Done  2. Outstanding issues: Final ABO and cross match     Signed Electronically by: Lourdes Irizarry    Ms. Ballard was seen and evaluated today as part of a shared APRN/PA visit.     I personally reviewed past medical and surgical history, vital signs, medications and labs, present and past medical history,and significant physical exam findings with the advanced practice provider.    My key findings include:  The left kidney has a small nonobstructing stone and 2 renal arteries.  Given the presence of the stone and an unremarkable prior workup for predisposition for stone formation I feel it is acceptable to move forward with a left-sided donor nephrectomy.  This will likely result in 2 arteries.  The smaller artery is approximately 2 mm.    The risks benefits and alternatives of the procedure were discussed in detail with the patient and accompanying family.  They acknowledged these risks and wished to proceed.  Cali Pérez M.D.,  Ph.D.    Again, thank you for allowing me to participate in the care of your patient.      Sincerely,    Cali Pérez MD

## 2019-04-08 NOTE — NURSING NOTE
Saw Elly and her  Fernando in clinic for pre-op.  Surgery is scheduled for  4-16-19.  I reviewed expectations for day of surgery.  I thanked donor for their gift. I reviewed the importance of donor follow up.   I gave the donor parking passes and donor blanket.  I answered all questions.    Provide information about ERAS and provided ensure clear and incentive spirometer.  Answered all questions.

## 2019-04-08 NOTE — PROGRESS NOTES
Transplant Surgery H&P                                                        HPI:                                                      Ms. Ballard is a 43 year old female who comes to clinic today for preop prior to planned laparoscopic kidney donation surgery. The patient was previously reviewed by the living donor multidisciplinary selection committee and found to be medically and psychosocially appropriate for voluntary kidney donation. The patient denies any feelings of being pressured to proceed with kidney donation.  Health events since donor evaluation: None    Special considerations:   Constipation: yes  PONV: no  History of Urinary retention? no  Significant neck/back/joint concerns for lateral decubitus positioning?: No  Islam: No    MEDICAL HISTORY:                                                      Patient Active Problem List    Diagnosis Date Noted     Transplant donor evaluation 12/17/2018     Priority: Medium     Trauma and stressor-related disorder 06/13/2018     Priority: Medium     Major depressive disorder, recurrent, moderate (H) 06/07/2018     Priority: Medium     Generalized anxiety disorder 06/07/2018     Priority: Medium      Past Medical History:   Diagnosis Date     Anxiety     controlled not on medications     IBS (irritable bowel syndrome)      Past Surgical History:   Procedure Laterality Date     NO HISTORY OF SURGERY       Current Outpatient Medications   Medication Sig Dispense Refill     cholecalciferol (VITAMIN D3) 27291 units capsule Take 1 capsule (50,000 Units) by mouth once a week (Patient not taking: Reported on 1/21/2019) 8 capsule 0     OTC products: None, except as noted above  Allergies   Allergen Reactions     Seasonal Allergies       Social History     Tobacco Use     Smoking status: Never Smoker     Smokeless tobacco: Never Used   Substance Use Topics     Alcohol use: Yes     Comment: 3/week     History   Drug Use No       REVIEW OF SYSTEMS:                                                     CONSTITUTIONAL: NEGATIVE for fever, chills, change in weight  INTEGUMENTARY/SKIN: NEGATIVE for worrisome rashes, moles or lesions  EYES: NEGATIVE for vision changes or irritation  ENT/MOUTH: NEGATIVE for ear, mouth and throat problems  RESP: NEGATIVE for significant cough or SOB  BREAST: NEGATIVE for masses, tenderness or discharge  CV: NEGATIVE for chest pain, palpitations or peripheral edema  GI: NEGATIVE for nausea, abdominal pain, heartburn, or change in bowel habits  : NEGATIVE for frequency, dysuria, or hematuria  MUSCULOSKELETAL: NEGATIVE for significant arthralgias or myalgia  NEURO: NEGATIVE for weakness, dizziness or paresthesias  ENDOCRINE: NEGATIVE for temperature intolerance, skin/hair changes  HEME: NEGATIVE for bleeding problems  PSYCHIATRIC: NEGATIVE for changes in mood or affect    EXAM:                                                    There were no vitals taken for this visit.    GENERAL APPEARANCE: healthy, alert and no distress     EYES: EOMI, PERRL     HENT: ear canals and TM's normal and nose and mouth without ulcers or lesions     NECK: no adenopathy, no asymmetry, masses, or scars and thyroid normal to palpation     RESP: lungs clear to auscultation - no rales, rhonchi or wheezes     CV: regular rates and rhythm, normal S1 S2, no S3 or S4 and no murmur, click or rub     ABDOMEN:  soft, nontender, no HSM or masses and bowel sounds normal     MS: extremities normal- no gross deformities noted, no evidence of inflammation in joints, FROM in all extremities.     SKIN: no suspicious lesions or rashes     NEURO: Normal strength and tone, sensory exam grossly normal, mentation intact and speech normal     PSYCH: mentation appears normal. and affect normal/bright     LYMPHATICS: No cervical adenopathy    DIAGNOSTICS:                                                      EKG: appears normal, NSR, Right Bundle Branch Block  Chest XRay  Labs Resulted Today:    Results for orders placed or performed in visit on 04/08/19   Creatinine   Result Value Ref Range    Creatinine 0.59 0.52 - 1.04 mg/dL    GFR Estimate >90 >60 mL/min/[1.73_m2]    GFR Estimate If Black >90 >60 mL/min/[1.73_m2]   Hemoglobin   Result Value Ref Range    Hemoglobin 12.6 11.7 - 15.7 g/dL   HCG quantitative pregnancy   Result Value Ref Range    HCG Quantitative Serum <1 0 - 5 IU/L   UA reflex to Microscopic   Result Value Ref Range    Color Urine Straw     Appearance Urine Clear     Glucose Urine Negative NEG^Negative mg/dL    Bilirubin Urine Negative NEG^Negative    Ketones Urine Negative NEG^Negative mg/dL    Specific Gravity Urine 1.003 1.003 - 1.035    Blood Urine Negative NEG^Negative    pH Urine 7.0 5.0 - 7.0 pH    Protein Albumin Urine Negative NEG^Negative mg/dL    Urobilinogen mg/dL 0.0 0.0 - 2.0 mg/dL    Nitrite Urine Negative NEG^Negative    Leukocyte Esterase Urine Negative NEG^Negative    Source Midstream Urine    ABO/Rh type and screen   Result Value Ref Range    ABO PENDING     Antibody Screen PENDING     Test Valid Only At          [facility]    Specimen Expires 04/11/2019      Labs Drawn and in Process:   Unresulted Labs Ordered in the Past 30 Days of this Admission     Date and Time Order Name Status Description    4/8/2019 1054 EBV CAPSID ANTIBODY IGG In process     4/8/2019 1054 HLA FINAL CROSSMATCH DONOR In process     4/8/2019 1054 ABO/RH TYPE AND SCREEN In process     4/8/2019 1054 HEPATITIS B CORE ANTIBODY In process     4/8/2019 1054 HEPATITIS B SURFACE ANTIGEN In process     4/8/2019 1054 HEPATITIS C ANTIBODY In process     4/8/2019 1054 HIV ANTIGEN ANTIBODY COMBO PRETRANSPLANT In process     4/8/2019 1054 HBV HCV HIV WNV BY HARSHIL In process         Recent Labs   Lab Test 04/08/19  1110 01/04/19  0749  04/19/18  1040 12/11/16  1246   HGB 12.6 13.4   < > 13.1 12.8   PLT  --  223  --  206 199   INR  --  1.10  --   --   --    NA  --  140   --   --  143   POTASSIUM  --  3.6  --   --  3.5   CR 0.59 0.62   < >  --  0.80   A1C  --  5.2  --   --   --     < > = values in this interval not displayed.      Assessment:                                                    Healthy voluntary kidney donor    There have been no significant intercurrent medical problems or change of intent in proceeding with kidney donation as scheduled on 4/16/2019.    1. Labs reviewed and within normal limits: No  2. EKG (1/4/2019): appears normal, NSR, RBBB  3. Paired Exchange case: No  4. ABO= PENDING  5. Laterality: left  6. Outstanding issues: final ABO and cross match    Plan:                                                      1. Consent: Done  2. Outstanding issues: Final ABO and cross match     Signed Electronically by: Lourdes Irizarry    Ms. Ballard was seen and evaluated today as part of a shared APRN/PA visit.     I personally reviewed past medical and surgical history, vital signs, medications and labs, present and past medical history,and significant physical exam findings with the advanced practice provider.    My key findings include:  The left kidney has a small nonobstructing stone and 2 renal arteries.  Given the presence of the stone and an unremarkable prior workup for predisposition for stone formation I feel it is acceptable to move forward with a left-sided donor nephrectomy.  This will likely result in 2 arteries.  The smaller artery is approximately 2 mm.    The risks benefits and alternatives of the procedure were discussed in detail with the patient and accompanying family.  They acknowledged these risks and wished to proceed.  Cali Pérez M.D., Ph.D.

## 2019-04-09 LAB
EBV VCA IGG SER QL IA: <0.2 AI (ref 0–0.8)
HLA FINAL CROSSMATCH DONOR: NORMAL

## 2019-04-10 LAB
MPX SERIES: NONREACTIVE
WNV RNA SPEC QL NAA+PROBE: NONREACTIVE

## 2019-04-15 ENCOUNTER — ANESTHESIA EVENT (OUTPATIENT)
Dept: SURGERY | Facility: CLINIC | Age: 44
DRG: 661 | End: 2019-04-15

## 2019-04-16 ENCOUNTER — ANESTHESIA (OUTPATIENT)
Dept: SURGERY | Facility: CLINIC | Age: 44
DRG: 661 | End: 2019-04-16

## 2019-04-16 ENCOUNTER — HOSPITAL ENCOUNTER (INPATIENT)
Facility: CLINIC | Age: 44
LOS: 3 days | Discharge: HOME | DRG: 661 | End: 2019-04-19
Attending: TRANSPLANT SURGERY | Admitting: TRANSPLANT SURGERY

## 2019-04-16 DIAGNOSIS — Z52.4 ENCOUNTER FOR DONATION OF KIDNEY: ICD-10-CM

## 2019-04-16 DIAGNOSIS — Z00.5 TRANSPLANT DONOR EVALUATION: Primary | ICD-10-CM

## 2019-04-16 LAB
ABO + RH BLD: NORMAL
ABO + RH BLD: NORMAL
BLD GP AB SCN SERPL QL: NORMAL
BLOOD BANK CMNT PATIENT-IMP: NORMAL
BLOOD BANK CMNT PATIENT-IMP: NORMAL
CREAT SERPL-MCNC: 0.62 MG/DL (ref 0.52–1.04)
GFR SERPL CREATININE-BSD FRML MDRD: >90 ML/MIN/{1.73_M2}
GLUCOSE BLDC GLUCOMTR-MCNC: 94 MG/DL (ref 70–99)
HCG UR QL: NEGATIVE
HCT VFR BLD AUTO: 33.6 % (ref 35–47)
HGB BLD-MCNC: 11.2 G/DL (ref 11.7–15.7)
HGB BLD-MCNC: 12.6 G/DL (ref 11.7–15.7)
POTASSIUM SERPL-SCNC: 3.2 MMOL/L (ref 3.4–5.3)
SPECIMEN EXP DATE BLD: NORMAL

## 2019-04-16 PROCEDURE — 25000132 ZZH RX MED GY IP 250 OP 250 PS 637: Performed by: NURSE PRACTITIONER

## 2019-04-16 PROCEDURE — 85018 HEMOGLOBIN: CPT | Performed by: ANESTHESIOLOGY

## 2019-04-16 PROCEDURE — 37000008 ZZH ANESTHESIA TECHNICAL FEE, 1ST 30 MIN: Performed by: TRANSPLANT SURGERY

## 2019-04-16 PROCEDURE — 71000014 ZZH RECOVERY PHASE 1 LEVEL 2 FIRST HR: Performed by: TRANSPLANT SURGERY

## 2019-04-16 PROCEDURE — 25000565 ZZH ISOFLURANE, EA 15 MIN: Performed by: TRANSPLANT SURGERY

## 2019-04-16 PROCEDURE — 25000132 ZZH RX MED GY IP 250 OP 250 PS 637: Performed by: STUDENT IN AN ORGANIZED HEALTH CARE EDUCATION/TRAINING PROGRAM

## 2019-04-16 PROCEDURE — 36000064 ZZH SURGERY LEVEL 4 EA 15 ADDTL MIN - UMMC: Performed by: TRANSPLANT SURGERY

## 2019-04-16 PROCEDURE — 12000026 ZZH R&B TRANSPLANT

## 2019-04-16 PROCEDURE — 83520 IMMUNOASSAY QUANT NOS NONAB: CPT | Performed by: ANESTHESIOLOGY

## 2019-04-16 PROCEDURE — 0TT00ZZ RESECTION OF RIGHT KIDNEY, OPEN APPROACH: ICD-10-PCS | Performed by: TRANSPLANT SURGERY

## 2019-04-16 PROCEDURE — 82565 ASSAY OF CREATININE: CPT | Performed by: ANESTHESIOLOGY

## 2019-04-16 PROCEDURE — 81025 URINE PREGNANCY TEST: CPT | Performed by: ANESTHESIOLOGY

## 2019-04-16 PROCEDURE — 25000128 H RX IP 250 OP 636: Performed by: NURSE ANESTHETIST, CERTIFIED REGISTERED

## 2019-04-16 PROCEDURE — 25000128 H RX IP 250 OP 636: Performed by: SURGERY

## 2019-04-16 PROCEDURE — 85014 HEMATOCRIT: CPT | Performed by: SURGERY

## 2019-04-16 PROCEDURE — 25000128 H RX IP 250 OP 636: Performed by: NURSE PRACTITIONER

## 2019-04-16 PROCEDURE — 25800030 ZZH RX IP 258 OP 636: Performed by: NURSE PRACTITIONER

## 2019-04-16 PROCEDURE — 25000128 H RX IP 250 OP 636: Performed by: STUDENT IN AN ORGANIZED HEALTH CARE EDUCATION/TRAINING PROGRAM

## 2019-04-16 PROCEDURE — 84132 ASSAY OF SERUM POTASSIUM: CPT | Performed by: ANESTHESIOLOGY

## 2019-04-16 PROCEDURE — 00000146 ZZHCL STATISTIC GLUCOSE BY METER IP

## 2019-04-16 PROCEDURE — 25000125 ZZHC RX 250: Performed by: NURSE ANESTHETIST, CERTIFIED REGISTERED

## 2019-04-16 PROCEDURE — 25000125 ZZHC RX 250: Performed by: TRANSPLANT SURGERY

## 2019-04-16 PROCEDURE — 36415 COLL VENOUS BLD VENIPUNCTURE: CPT | Performed by: ANESTHESIOLOGY

## 2019-04-16 PROCEDURE — 27210794 ZZH OR GENERAL SUPPLY STERILE: Performed by: TRANSPLANT SURGERY

## 2019-04-16 PROCEDURE — 37000009 ZZH ANESTHESIA TECHNICAL FEE, EACH ADDTL 15 MIN: Performed by: TRANSPLANT SURGERY

## 2019-04-16 PROCEDURE — 85018 HEMOGLOBIN: CPT | Performed by: SURGERY

## 2019-04-16 PROCEDURE — 25000128 H RX IP 250 OP 636: Performed by: ANESTHESIOLOGY

## 2019-04-16 PROCEDURE — 36000062 ZZH SURGERY LEVEL 4 1ST 30 MIN - UMMC: Performed by: TRANSPLANT SURGERY

## 2019-04-16 PROCEDURE — 25000125 ZZHC RX 250: Performed by: NURSE PRACTITIONER

## 2019-04-16 PROCEDURE — 71000015 ZZH RECOVERY PHASE 1 LEVEL 2 EA ADDTL HR: Performed by: TRANSPLANT SURGERY

## 2019-04-16 PROCEDURE — 25000132 ZZH RX MED GY IP 250 OP 250 PS 637: Performed by: SURGERY

## 2019-04-16 PROCEDURE — 40000171 ZZH STATISTIC PRE-PROCEDURE ASSESSMENT III: Performed by: TRANSPLANT SURGERY

## 2019-04-16 PROCEDURE — 25800030 ZZH RX IP 258 OP 636: Performed by: ANESTHESIOLOGY

## 2019-04-16 PROCEDURE — 25800030 ZZH RX IP 258 OP 636: Performed by: NURSE ANESTHETIST, CERTIFIED REGISTERED

## 2019-04-16 PROCEDURE — C9290 INJ, BUPIVACAINE LIPOSOME: HCPCS | Performed by: STUDENT IN AN ORGANIZED HEALTH CARE EDUCATION/TRAINING PROGRAM

## 2019-04-16 RX ORDER — CEFUROXIME SODIUM 1.5 G/16ML
1.5 INJECTION, POWDER, FOR SOLUTION INTRAVENOUS
Status: DISCONTINUED | OUTPATIENT
Start: 2019-04-16 | End: 2019-04-16 | Stop reason: HOSPADM

## 2019-04-16 RX ORDER — OXYCODONE HYDROCHLORIDE 5 MG/1
5-10 TABLET ORAL
Status: DISCONTINUED | OUTPATIENT
Start: 2019-04-16 | End: 2019-04-16

## 2019-04-16 RX ORDER — HEPARIN SODIUM 1000 [USP'U]/ML
INJECTION, SOLUTION INTRAVENOUS; SUBCUTANEOUS PRN
Status: DISCONTINUED | OUTPATIENT
Start: 2019-04-16 | End: 2019-04-16

## 2019-04-16 RX ORDER — NALOXONE HYDROCHLORIDE 0.4 MG/ML
.1-.4 INJECTION, SOLUTION INTRAMUSCULAR; INTRAVENOUS; SUBCUTANEOUS
Status: ACTIVE | OUTPATIENT
Start: 2019-04-16 | End: 2019-04-17

## 2019-04-16 RX ORDER — EPHEDRINE SULFATE 50 MG/ML
INJECTION, SOLUTION INTRAMUSCULAR; INTRAVENOUS; SUBCUTANEOUS PRN
Status: DISCONTINUED | OUTPATIENT
Start: 2019-04-16 | End: 2019-04-16

## 2019-04-16 RX ORDER — HEPARIN SODIUM 1000 [USP'U]/ML
70 INJECTION, SOLUTION INTRAVENOUS; SUBCUTANEOUS ONCE
Status: DISCONTINUED | OUTPATIENT
Start: 2019-04-16 | End: 2019-04-16 | Stop reason: HOSPADM

## 2019-04-16 RX ORDER — SODIUM CHLORIDE, SODIUM LACTATE, POTASSIUM CHLORIDE, CALCIUM CHLORIDE 600; 310; 30; 20 MG/100ML; MG/100ML; MG/100ML; MG/100ML
1-3 INJECTION, SOLUTION INTRAVENOUS CONTINUOUS
Status: DISCONTINUED | OUTPATIENT
Start: 2019-04-16 | End: 2019-04-16 | Stop reason: HOSPADM

## 2019-04-16 RX ORDER — HYDROMORPHONE HYDROCHLORIDE 1 MG/ML
.3-.5 INJECTION, SOLUTION INTRAMUSCULAR; INTRAVENOUS; SUBCUTANEOUS EVERY 5 MIN PRN
Status: DISCONTINUED | OUTPATIENT
Start: 2019-04-16 | End: 2019-04-16 | Stop reason: HOSPADM

## 2019-04-16 RX ORDER — DEXTROSE, SODIUM CHLORIDE, SODIUM LACTATE, POTASSIUM CHLORIDE, AND CALCIUM CHLORIDE 5; .6; .31; .03; .02 G/100ML; G/100ML; G/100ML; G/100ML; G/100ML
INJECTION, SOLUTION INTRAVENOUS CONTINUOUS
Status: DISCONTINUED | OUTPATIENT
Start: 2019-04-16 | End: 2019-04-17

## 2019-04-16 RX ORDER — ONDANSETRON 4 MG/1
4 TABLET, ORALLY DISINTEGRATING ORAL EVERY 6 HOURS PRN
Status: DISCONTINUED | OUTPATIENT
Start: 2019-04-16 | End: 2019-04-19 | Stop reason: HOSPADM

## 2019-04-16 RX ORDER — METOCLOPRAMIDE HYDROCHLORIDE 5 MG/ML
10 INJECTION INTRAMUSCULAR; INTRAVENOUS EVERY 6 HOURS PRN
Status: DISCONTINUED | OUTPATIENT
Start: 2019-04-16 | End: 2019-04-19 | Stop reason: HOSPADM

## 2019-04-16 RX ORDER — MANNITOL 20 G/100ML
INJECTION, SOLUTION INTRAVENOUS PRN
Status: DISCONTINUED | OUTPATIENT
Start: 2019-04-16 | End: 2019-04-16

## 2019-04-16 RX ORDER — ONDANSETRON 4 MG/1
4 TABLET, ORALLY DISINTEGRATING ORAL EVERY 30 MIN PRN
Status: DISCONTINUED | OUTPATIENT
Start: 2019-04-16 | End: 2019-04-16 | Stop reason: HOSPADM

## 2019-04-16 RX ORDER — AMOXICILLIN 250 MG
1 CAPSULE ORAL 2 TIMES DAILY
Status: DISCONTINUED | OUTPATIENT
Start: 2019-04-16 | End: 2019-04-19 | Stop reason: HOSPADM

## 2019-04-16 RX ORDER — ONDANSETRON 2 MG/ML
4 INJECTION INTRAMUSCULAR; INTRAVENOUS EVERY 6 HOURS PRN
Status: DISCONTINUED | OUTPATIENT
Start: 2019-04-16 | End: 2019-04-19 | Stop reason: HOSPADM

## 2019-04-16 RX ORDER — NALOXONE HYDROCHLORIDE 0.4 MG/ML
.1-.4 INJECTION, SOLUTION INTRAMUSCULAR; INTRAVENOUS; SUBCUTANEOUS
Status: DISCONTINUED | OUTPATIENT
Start: 2019-04-17 | End: 2019-04-19 | Stop reason: HOSPADM

## 2019-04-16 RX ORDER — KETOROLAC TROMETHAMINE 30 MG/ML
15 INJECTION, SOLUTION INTRAMUSCULAR; INTRAVENOUS ONCE
Status: COMPLETED | OUTPATIENT
Start: 2019-04-16 | End: 2019-04-16

## 2019-04-16 RX ORDER — NALOXONE HYDROCHLORIDE 0.4 MG/ML
.1-.4 INJECTION, SOLUTION INTRAMUSCULAR; INTRAVENOUS; SUBCUTANEOUS
Status: DISCONTINUED | OUTPATIENT
Start: 2019-04-16 | End: 2019-04-16 | Stop reason: HOSPADM

## 2019-04-16 RX ORDER — PROTAMINE SULFATE 10 MG/ML
50 INJECTION, SOLUTION INTRAVENOUS ONCE
Status: COMPLETED | OUTPATIENT
Start: 2019-04-16 | End: 2019-04-16

## 2019-04-16 RX ORDER — FENTANYL CITRATE 50 UG/ML
INJECTION, SOLUTION INTRAMUSCULAR; INTRAVENOUS PRN
Status: DISCONTINUED | OUTPATIENT
Start: 2019-04-16 | End: 2019-04-16

## 2019-04-16 RX ORDER — ONDANSETRON 2 MG/ML
4 INJECTION INTRAMUSCULAR; INTRAVENOUS ONCE
Status: DISCONTINUED | OUTPATIENT
Start: 2019-04-16 | End: 2019-04-16 | Stop reason: HOSPADM

## 2019-04-16 RX ORDER — LIDOCAINE 40 MG/G
CREAM TOPICAL
Status: DISCONTINUED | OUTPATIENT
Start: 2019-04-16 | End: 2019-04-16 | Stop reason: HOSPADM

## 2019-04-16 RX ORDER — FENTANYL CITRATE 50 UG/ML
25-50 INJECTION, SOLUTION INTRAMUSCULAR; INTRAVENOUS
Status: DISCONTINUED | OUTPATIENT
Start: 2019-04-16 | End: 2019-04-16 | Stop reason: HOSPADM

## 2019-04-16 RX ORDER — ONDANSETRON 2 MG/ML
INJECTION INTRAMUSCULAR; INTRAVENOUS PRN
Status: DISCONTINUED | OUTPATIENT
Start: 2019-04-16 | End: 2019-04-16

## 2019-04-16 RX ORDER — DEXAMETHASONE SODIUM PHOSPHATE 4 MG/ML
8 INJECTION, SOLUTION INTRA-ARTICULAR; INTRALESIONAL; INTRAMUSCULAR; INTRAVENOUS; SOFT TISSUE ONCE
Status: DISCONTINUED | OUTPATIENT
Start: 2019-04-16 | End: 2019-04-16 | Stop reason: HOSPADM

## 2019-04-16 RX ORDER — PROCHLORPERAZINE MALEATE 10 MG
10 TABLET ORAL EVERY 6 HOURS PRN
Status: DISCONTINUED | OUTPATIENT
Start: 2019-04-16 | End: 2019-04-19 | Stop reason: HOSPADM

## 2019-04-16 RX ORDER — SODIUM CHLORIDE, SODIUM LACTATE, POTASSIUM CHLORIDE, CALCIUM CHLORIDE 600; 310; 30; 20 MG/100ML; MG/100ML; MG/100ML; MG/100ML
INJECTION, SOLUTION INTRAVENOUS CONTINUOUS
Status: DISCONTINUED | OUTPATIENT
Start: 2019-04-16 | End: 2019-04-16 | Stop reason: HOSPADM

## 2019-04-16 RX ORDER — FENTANYL CITRATE 50 UG/ML
10-20 INJECTION, SOLUTION INTRAMUSCULAR; INTRAVENOUS
Status: DISCONTINUED | OUTPATIENT
Start: 2019-04-16 | End: 2019-04-19 | Stop reason: HOSPADM

## 2019-04-16 RX ORDER — CEFUROXIME SODIUM 1.5 G/16ML
1.5 INJECTION, POWDER, FOR SOLUTION INTRAVENOUS
Status: COMPLETED | OUTPATIENT
Start: 2019-04-16 | End: 2019-04-16

## 2019-04-16 RX ORDER — HYDROMORPHONE HYDROCHLORIDE 2 MG/1
2-4 TABLET ORAL
Status: DISCONTINUED | OUTPATIENT
Start: 2019-04-16 | End: 2019-04-17

## 2019-04-16 RX ORDER — PROPOFOL 10 MG/ML
INJECTION, EMULSION INTRAVENOUS PRN
Status: DISCONTINUED | OUTPATIENT
Start: 2019-04-16 | End: 2019-04-16

## 2019-04-16 RX ORDER — DEXAMETHASONE SODIUM PHOSPHATE 4 MG/ML
INJECTION, SOLUTION INTRA-ARTICULAR; INTRALESIONAL; INTRAMUSCULAR; INTRAVENOUS; SOFT TISSUE PRN
Status: DISCONTINUED | OUTPATIENT
Start: 2019-04-16 | End: 2019-04-16

## 2019-04-16 RX ORDER — CARDIOPLEG/ORGAN PRESERV NO.1 9-198-2-1
BOTTLE PERFUSION PRN
Status: DISCONTINUED | OUTPATIENT
Start: 2019-04-16 | End: 2019-04-16 | Stop reason: HOSPADM

## 2019-04-16 RX ORDER — GLYCOPYRROLATE 0.2 MG/ML
INJECTION, SOLUTION INTRAMUSCULAR; INTRAVENOUS PRN
Status: DISCONTINUED | OUTPATIENT
Start: 2019-04-16 | End: 2019-04-16

## 2019-04-16 RX ORDER — GABAPENTIN 300 MG/1
300 CAPSULE ORAL ONCE
Status: COMPLETED | OUTPATIENT
Start: 2019-04-16 | End: 2019-04-16

## 2019-04-16 RX ORDER — FLUMAZENIL 0.1 MG/ML
0.2 INJECTION, SOLUTION INTRAVENOUS
Status: DISCONTINUED | OUTPATIENT
Start: 2019-04-16 | End: 2019-04-16 | Stop reason: HOSPADM

## 2019-04-16 RX ORDER — OXYCODONE HCL 5 MG/5 ML
5 SOLUTION, ORAL ORAL EVERY 4 HOURS PRN
Status: DISCONTINUED | OUTPATIENT
Start: 2019-04-16 | End: 2019-04-16

## 2019-04-16 RX ORDER — ONDANSETRON 2 MG/ML
4 INJECTION INTRAMUSCULAR; INTRAVENOUS EVERY 30 MIN PRN
Status: DISCONTINUED | OUTPATIENT
Start: 2019-04-16 | End: 2019-04-16 | Stop reason: HOSPADM

## 2019-04-16 RX ORDER — ACETAMINOPHEN 325 MG/1
975 TABLET ORAL EVERY 8 HOURS
Status: DISCONTINUED | OUTPATIENT
Start: 2019-04-16 | End: 2019-04-18

## 2019-04-16 RX ORDER — AMOXICILLIN 250 MG
2 CAPSULE ORAL 2 TIMES DAILY
Status: DISCONTINUED | OUTPATIENT
Start: 2019-04-16 | End: 2019-04-19 | Stop reason: HOSPADM

## 2019-04-16 RX ORDER — LIDOCAINE HYDROCHLORIDE 20 MG/ML
INJECTION, SOLUTION INFILTRATION; PERINEURAL PRN
Status: DISCONTINUED | OUTPATIENT
Start: 2019-04-16 | End: 2019-04-16

## 2019-04-16 RX ORDER — METOCLOPRAMIDE 10 MG/1
10 TABLET ORAL EVERY 6 HOURS PRN
Status: DISCONTINUED | OUTPATIENT
Start: 2019-04-16 | End: 2019-04-19 | Stop reason: HOSPADM

## 2019-04-16 RX ORDER — ALBUMIN, HUMAN INJ 5% 5 %
250 SOLUTION INTRAVENOUS EVERY 10 MIN PRN
Status: DISCONTINUED | OUTPATIENT
Start: 2019-04-16 | End: 2019-04-16 | Stop reason: HOSPADM

## 2019-04-16 RX ORDER — ACETAMINOPHEN 325 MG/1
975 TABLET ORAL ONCE
Status: COMPLETED | OUTPATIENT
Start: 2019-04-16 | End: 2019-04-16

## 2019-04-16 RX ORDER — BUPIVACAINE HYDROCHLORIDE 2.5 MG/ML
INJECTION, SOLUTION EPIDURAL; INFILTRATION; INTRACAUDAL PRN
Status: DISCONTINUED | OUTPATIENT
Start: 2019-04-16 | End: 2019-04-16

## 2019-04-16 RX ORDER — FUROSEMIDE 10 MG/ML
INJECTION INTRAMUSCULAR; INTRAVENOUS PRN
Status: DISCONTINUED | OUTPATIENT
Start: 2019-04-16 | End: 2019-04-16

## 2019-04-16 RX ADMIN — Medication 10 MG: at 09:49

## 2019-04-16 RX ADMIN — SENNOSIDES AND DOCUSATE SODIUM 1 TABLET: 8.6; 5 TABLET ORAL at 19:43

## 2019-04-16 RX ADMIN — DEXAMETHASONE SODIUM PHOSPHATE 8 MG: 4 INJECTION, SOLUTION INTRA-ARTICULAR; INTRALESIONAL; INTRAMUSCULAR; INTRAVENOUS; SOFT TISSUE at 09:12

## 2019-04-16 RX ADMIN — FENTANYL CITRATE 100 MCG: 50 INJECTION, SOLUTION INTRAMUSCULAR; INTRAVENOUS at 10:11

## 2019-04-16 RX ADMIN — ACETAMINOPHEN 975 MG: 325 TABLET, FILM COATED ORAL at 23:51

## 2019-04-16 RX ADMIN — FENTANYL CITRATE 50 MCG: 50 INJECTION, SOLUTION INTRAMUSCULAR; INTRAVENOUS at 09:01

## 2019-04-16 RX ADMIN — BUPIVACAINE 20 ML: 13.3 INJECTION, SUSPENSION, LIPOSOMAL INFILTRATION at 07:30

## 2019-04-16 RX ADMIN — SODIUM CHLORIDE, SODIUM LACTATE, POTASSIUM CHLORIDE, CALCIUM CHLORIDE AND DEXTROSE MONOHYDRATE: 5; 600; 310; 30; 20 INJECTION, SOLUTION INTRAVENOUS at 14:32

## 2019-04-16 RX ADMIN — HEPARIN SODIUM 2500 UNITS: 1000 INJECTION, SOLUTION INTRAVENOUS; SUBCUTANEOUS at 11:56

## 2019-04-16 RX ADMIN — Medication 2 G: at 09:28

## 2019-04-16 RX ADMIN — KETOROLAC TROMETHAMINE 15 MG: 30 INJECTION INTRAMUSCULAR; INTRAVENOUS at 06:34

## 2019-04-16 RX ADMIN — ACETAMINOPHEN 975 MG: 325 TABLET, FILM COATED ORAL at 06:59

## 2019-04-16 RX ADMIN — PHENYLEPHRINE HYDROCHLORIDE 100 MCG: 10 INJECTION, SOLUTION INTRAMUSCULAR; INTRAVENOUS; SUBCUTANEOUS at 09:40

## 2019-04-16 RX ADMIN — BUPIVACAINE HYDROCHLORIDE 20 ML: 2.5 INJECTION, SOLUTION EPIDURAL; INFILTRATION; INTRACAUDAL; PERINEURAL at 07:30

## 2019-04-16 RX ADMIN — PROPOFOL 90 MG: 10 INJECTION, EMULSION INTRAVENOUS at 09:09

## 2019-04-16 RX ADMIN — ONDANSETRON 4 MG: 2 INJECTION INTRAMUSCULAR; INTRAVENOUS at 12:47

## 2019-04-16 RX ADMIN — Medication 0.3 MG: at 14:45

## 2019-04-16 RX ADMIN — FENTANYL CITRATE 50 MCG: 50 INJECTION INTRAMUSCULAR; INTRAVENOUS at 14:38

## 2019-04-16 RX ADMIN — SODIUM CHLORIDE, POTASSIUM CHLORIDE, SODIUM LACTATE AND CALCIUM CHLORIDE: 600; 310; 30; 20 INJECTION, SOLUTION INTRAVENOUS at 08:59

## 2019-04-16 RX ADMIN — FENTANYL CITRATE 25 MCG: 50 INJECTION INTRAMUSCULAR; INTRAVENOUS at 14:22

## 2019-04-16 RX ADMIN — SODIUM CHLORIDE, POTASSIUM CHLORIDE, SODIUM LACTATE AND CALCIUM CHLORIDE: 600; 310; 30; 20 INJECTION, SOLUTION INTRAVENOUS at 10:50

## 2019-04-16 RX ADMIN — GLYCOPYRROLATE 0.2 MG: 0.2 INJECTION, SOLUTION INTRAMUSCULAR; INTRAVENOUS at 10:06

## 2019-04-16 RX ADMIN — RANITIDINE 150 MG: 150 TABLET ORAL at 19:42

## 2019-04-16 RX ADMIN — LIDOCAINE HYDROCHLORIDE 60 MG: 20 INJECTION, SOLUTION INFILTRATION; PERINEURAL at 09:09

## 2019-04-16 RX ADMIN — MANNITOL 12.5 G: 20 INJECTION, SOLUTION INTRAVENOUS at 11:45

## 2019-04-16 RX ADMIN — SODIUM CHLORIDE, POTASSIUM CHLORIDE, SODIUM LACTATE AND CALCIUM CHLORIDE: 600; 310; 30; 20 INJECTION, SOLUTION INTRAVENOUS at 09:00

## 2019-04-16 RX ADMIN — FENTANYL CITRATE 50 MCG: 50 INJECTION, SOLUTION INTRAMUSCULAR; INTRAVENOUS at 09:08

## 2019-04-16 RX ADMIN — GABAPENTIN 300 MG: 300 CAPSULE ORAL at 06:33

## 2019-04-16 RX ADMIN — MIDAZOLAM 2 MG: 1 INJECTION INTRAMUSCULAR; INTRAVENOUS at 09:01

## 2019-04-16 RX ADMIN — FENTANYL CITRATE 50 MCG: 50 INJECTION INTRAMUSCULAR; INTRAVENOUS at 06:50

## 2019-04-16 RX ADMIN — ROCURONIUM BROMIDE 50 MG: 10 INJECTION INTRAVENOUS at 09:09

## 2019-04-16 RX ADMIN — HYDROMORPHONE HYDROCHLORIDE 2 MG: 2 TABLET ORAL at 21:08

## 2019-04-16 RX ADMIN — Medication 0.4 MG: at 15:05

## 2019-04-16 RX ADMIN — PHENYLEPHRINE HYDROCHLORIDE 100 MCG: 10 INJECTION, SOLUTION INTRAMUSCULAR; INTRAVENOUS; SUBCUTANEOUS at 09:31

## 2019-04-16 RX ADMIN — CEFUROXIME SODIUM 1.5 G: 1.5 INJECTION, POWDER, FOR SOLUTION INTRAVENOUS at 11:30

## 2019-04-16 RX ADMIN — PROTAMINE SULFATE 25 MG: 10 INJECTION, SOLUTION INTRAVENOUS at 12:00

## 2019-04-16 RX ADMIN — PHENYLEPHRINE HYDROCHLORIDE 100 MCG: 10 INJECTION, SOLUTION INTRAMUSCULAR; INTRAVENOUS; SUBCUTANEOUS at 09:37

## 2019-04-16 RX ADMIN — ACETAMINOPHEN 975 MG: 325 TABLET, FILM COATED ORAL at 16:30

## 2019-04-16 RX ADMIN — FUROSEMIDE 5 MG: 10 INJECTION, SOLUTION INTRAVENOUS at 11:41

## 2019-04-16 RX ADMIN — FENTANYL CITRATE 25 MCG: 50 INJECTION INTRAMUSCULAR; INTRAVENOUS at 14:30

## 2019-04-16 RX ADMIN — HYDROMORPHONE HYDROCHLORIDE 2 MG: 2 TABLET ORAL at 18:05

## 2019-04-16 RX ADMIN — CEFUROXIME 1.5 G: 1.5 INJECTION, POWDER, FOR SOLUTION INTRAVENOUS at 09:30

## 2019-04-16 RX ADMIN — SUGAMMADEX 150 MG: 100 INJECTION, SOLUTION INTRAVENOUS at 13:03

## 2019-04-16 RX ADMIN — PHENYLEPHRINE HYDROCHLORIDE 100 MCG: 10 INJECTION, SOLUTION INTRAMUSCULAR; INTRAVENOUS; SUBCUTANEOUS at 09:27

## 2019-04-16 RX ADMIN — MIDAZOLAM 1 MG: 1 INJECTION INTRAMUSCULAR; INTRAVENOUS at 06:50

## 2019-04-16 ASSESSMENT — ACTIVITIES OF DAILY LIVING (ADL)
DRESS: 0-->INDEPENDENT
RETIRED_COMMUNICATION: 0-->UNDERSTANDS/COMMUNICATES WITHOUT DIFFICULTY
FALL_HISTORY_WITHIN_LAST_SIX_MONTHS: NO
ADLS_ACUITY_SCORE: 20
RETIRED_EATING: 0-->INDEPENDENT
SWALLOWING: 0-->SWALLOWS FOODS/LIQUIDS WITHOUT DIFFICULTY
AMBULATION: 0-->INDEPENDENT
COGNITION: 0 - NO COGNITION ISSUES REPORTED
BATHING: 0-->INDEPENDENT
TRANSFERRING: 0-->INDEPENDENT
TOILETING: 0-->INDEPENDENT
ADLS_ACUITY_SCORE: 13

## 2019-04-16 ASSESSMENT — MIFFLIN-ST. JEOR: SCORE: 1264.63

## 2019-04-16 NOTE — ANESTHESIA POSTPROCEDURE EVALUATION
Anesthesia POST Procedure Evaluation    Patient: Elly Ballard   MRN:     [id number] Gender:   female   Age:    43 year old :      1975        Preoperative Diagnosis: Kidney Donor   Procedure(s):  Laparoscopic Hand Assisted Living Related Kidney Donor   Postop Comments: No value filed.       Anesthesia Type:  General, Regional    Reportable Event: NO     PAIN: Uncomplicated   Sign Out status: Comfortable, Well controlled pain     PONV: No PONV   Sign Out status:  No Nausea or Vomiting     Neuro/Psych: Uneventful perioperative course   Sign Out Status: Preoperative baseline; Age appropriate mentation     Airway/Resp.: Uneventful perioperative course   Sign Out Status: Non labored breathing, age appropriate RR; Resp. Status within EXPECTED Parameters     CV: Uneventful perioperative course   Sign Out status: Appropriate BP and perfusion indices; Appropriate HR/Rhythm     Disposition:   Sign Out in:  PACU  Disposition:  Floor  Recovery Course: Uneventful  Follow-Up: Not required           Last Anesthesia Record Vitals:  CRNA VITALS  2019 1314 - 2019 1414      2019             NIBP:  95/58    Pulse:  74    Temp:  36.7  C (98  F)    SpO2:  100 %    Resp Rate (observed):  13          Last PACU Vitals:  Vitals Value Taken Time   BP 97/57 2019  2:50 PM   Temp 36.7  C (98  F) 2019  2:15 PM   Pulse 77 2019  2:50 PM   Resp 14 2019  2:45 PM   SpO2 100 % 2019  2:58 PM   Temp src     NIBP 95/58 2019  1:49 PM   Pulse 74 2019  1:49 PM   SpO2 100 % 2019  1:49 PM   Resp     Temp 36.7  C (98  F) 2019  1:49 PM   Ht Rate     Temp 2     Vitals shown include unvalidated device data.      Electronically Signed By: Odalys Grissom MD, 2019, 2:59 PM

## 2019-04-16 NOTE — LETTER
Transition Communication Hand-off for Care Transitions to Next Level of Care Provider    Name: Elly Ballard  : 1975  MRN #: [id number]  Primary Care Provider: Nika Briscoe     Primary Clinic: [address] [facility] [address]  [facility] [address]     Reason for Hospitalization:  Kidney Donor  Encounter for donation of kidney  Admit Date/Time: 2019  5:46 AM  Discharge Date: 19  Payor Source: Payor: PREFERREDONE / Plan: P[employer] HMO / Product Type: PPO /              Reason for Communication Hand-off Referral: Other K donor    Discharge Plan: f/u with surgeon       Concern for non-adherence with plan of care:   Y/N N  Discharge Needs Assessment:        Follow-up specialty is recommended: Yes    Follow-up plan:    Future Appointments   Date Time Provider Department Center   2019  1:00 PM Cali Pérez MD [facility]       Any outstanding tests or procedures:              Key Recommendations:      Juju Vizcaino    AVS/Discharge Summary is the source of truth; this is a helpful guide for improved communication of patient story

## 2019-04-16 NOTE — ANESTHESIA PREPROCEDURE EVALUATION
Anesthesia Pre-Procedure Evaluation    Patient: Elly Ballard   MRN:     [id number] Gender:   female   Age:    43 year old :      1975        Preoperative Diagnosis: Kidney Donor   Procedure(s):  Laparoscopic Hand Assisted Living Related Kidney Donor     Past Medical History:   Diagnosis Date     Anxiety     controlled not on medications     IBS (irritable bowel syndrome)       Past Surgical History:   Procedure Laterality Date     NO HISTORY OF SURGERY            Anesthesia Evaluation     . Pt has not had prior anesthetic            ROS/MED HX    ENT/Pulmonary:  - neg pulmonary ROS     Neurologic:  - neg neurologic ROS     Cardiovascular:  - neg cardiovascular ROS       METS/Exercise Tolerance:  >4 METS   Hematologic:  - neg hematologic  ROS       Musculoskeletal:  - neg musculoskeletal ROS       GI/Hepatic:     (+) Other GI/Hepatic irritable bowel syndrome      Renal/Genitourinary:  - ROS Renal section negative       Endo:  - neg endo ROS       Psychiatric:     (+) psychiatric history depression and anxiety      Infectious Disease:  - neg infectious disease ROS       Malignancy:      - no malignancy   Other:    (+) No chance of pregnancy no H/O Chronic Pain,                       PHYSICAL EXAM:   Mental Status/Neuro: A/A/O   Airway: Facies: Feasible  Mallampati: II  Mouth/Opening: Full  TM distance: > 6 cm  Neck ROM: Full   Respiratory: Auscultation: CTAB     Resp. Rate: Normal     Resp. Effort: Normal      CV: Rhythm: Regular  Rate: Age appropriate  Heart: Normal Sounds   Comments:                    Lab Results   Component Value Date    WBC 5.5 2019    HGB 12.6 2019    HCT 39.7 2019     2019     2019    POTASSIUM 3.2 (L) 2019    CHLORIDE 107 2019    CO2 27 2019    BUN 8 2019    CR 0.62 2019    GLC 86 2019    ROJAS 8.4 (L) 2019    PHOS 3.2 2019    ALBUMIN 4.2 2019    PROTTOTAL 7.3 2019    ALT 20  "01/04/2019    AST 14 01/04/2019    ALKPHOS 44 01/04/2019    BILITOTAL 0.5 01/04/2019    LIPASE 120 12/11/2016    PTT 32 01/04/2019    INR 1.10 01/04/2019    TSH 3.38 04/19/2018    HCG Negative 04/16/2019       Preop Vitals  BP Readings from Last 3 Encounters:   04/16/19 108/80   04/08/19 97/62   01/21/19 94/60    Pulse Readings from Last 3 Encounters:   04/16/19 62   04/08/19 64   01/21/19 64      Resp Readings from Last 3 Encounters:   04/16/19 16   01/21/19 16   11/20/18 16    SpO2 Readings from Last 3 Encounters:   04/16/19 100%   04/08/19 99%   01/21/19 100%      Temp Readings from Last 1 Encounters:   04/16/19 36.6  C (97.8  F) (Oral)    Ht Readings from Last 1 Encounters:   04/16/19 1.702 m (5' 7\")      Wt Readings from Last 1 Encounters:   04/16/19 57.7 kg (127 lb 3.3 oz)    Estimated body mass index is 19.92 kg/m  as calculated from the following:    Height as of this encounter: 1.702 m (5' 7\").    Weight as of this encounter: 57.7 kg (127 lb 3.3 oz).     LDA:  Peripheral IV 04/16/19 Left Hand (Active)   Site Assessment WDL 4/16/2019  6:36 AM   Line Status Saline locked 4/16/2019  6:36 AM   Phlebitis Scale 0-->no symptoms 4/16/2019  6:36 AM   Number of days: 0            Assessment:   ASA SCORE: 1    NPO Status: > 6 hours since completed Solid Foods   Documentation: H&P complete; Preop Testing complete; Consents complete   Proceeding: Proceed without further delay  Tobacco Use:  NO Active use of Tobacco/UNKNOWN Tobacco use status     Plan:   Anes. Type:  General; Regional     RA Details:  Pre Induction; SS; Exparel     RA-Location/Type: TAP; Plane Block   Pre-Induction: Midazolam IV; Opioid IV; Acetaminophen PO; Gabapentin PO   Induction:  IV (Standard)   Airway: Oral ETT   Access/Monitoring: PIV; 2nd PIV   Maintenance: Balanced   Emergence: Procedure Site   Logistics: Observation/Admission     Postop Pain/Sedation Strategy:  Standard-Options: Opioids PRN; Block SS; Exparel     PONV Management:  Adult Risk " Factors: Female, Non-Smoker, Postop Opioids  Prevention: Ondansetron; Dexamethasone; Propofol Infusion; Scop. Patch     CONSENT: Direct conversation   Plan and risks discussed with: Patient; Spouse   Blood Products: Consented (ALL Blood Products)       Comments for Plan/Consent:  44yo healthy female here for kidney donation.  ASA 1.  Plan: GETA, ERAS, PIVx2, PONV prophylaxis                         Odalys Grissom MD

## 2019-04-16 NOTE — PROGRESS NOTES
"SUBJECTIVE:  POD 0 Right Kidney laparoscopic assisted nephrectomy for donation.    Pain control adequate. Denies nausea or SOB. Discussed plan to ambulate this evening and be OOB to chair.     EXAM:   /64   Pulse 65   Temp 98.1  F (36.7  C) (Oral)   Resp 16   Ht 1.702 m (5' 7\")   Wt 57.7 kg (127 lb 3.3 oz)   LMP 04/09/2019   SpO2 100%   BMI 19.92 kg/m    Exam:  Constitutional: NAD  Cardiovascular: well perfused  Respiratory: NLB on RA  Gastrointestinal: soft, benign  Skin: incision c/d/i  : aguilar  Musculoskeletal: nt x 4  Neurologic: axox3    Assessment: Post-operative day #0, doing well.    PLAN:   -Pain control: Scheduled acetaminophen. Encourage oral analgesia prn. Fentanyl IV PRN.   -Remove aguilar tomorrow AM  -Discontinue IV fluid if intake > 500  -Regular diet as tolerated  -Zofran and compazine PRN  -Encouraged ambulation and IS this evening      Courtney Silva, PAC 3425          "

## 2019-04-16 NOTE — OP NOTE
Transplant Surgery  Operative Note    Procedure Date: 04/16/19   Preoperative Diagnosis: Healthy kidney donor   Postoperative Diagnosis: Healthy kidney donor   Procedure: 1. Right Kidney Laparoscopic Hand-Assisted living donor nephrectomy for donoation   Secondary Procedure:     Surgeon:    Surgeon(s) and Role:     * Cali Pérez MD - Primary     * Josse Holloway MD - Fellow - Assisting   Fellow/Assistant:   Jay Brizuela.  There was no qualified resident to assist with this procedure.     Specimen: Right kidney and ureter   Anesthesia: General   Urine Output: 250 mls Estimated Blood Loss: 100 mls  Fluids Administered: 2000mLs     Intra Op Events: none     Complications: None.    Findings: right kidney, single vessels      None.      Donor UNOS ID:  [id number] Flush Start time: 4/16/2019 12:02 PM   Arterial Clamp:  4/16/2019 12:01 PM Arterial anatomy: Single   Venous anatomy:  Single Ureteral anatomy:  Single     Indication: Elly Ballard presents for Right Kidney donation. The patient has undergone a thorough medical and psychosocial evaluation and was found suitable for voluntary kidney donation. Risks and benefits of donation were discussed. The patient expressed understanding, was willing to proceed, and provided informed consent.    Final ABO/Crossmatch verification: Prior to incision, I verified the donor ABO and recipient ABO.  I visually verified that the donor identification, blood type, and other vital data were compatible with the recipient.     Operative Procedure: Elly Ballard was transported to the operating room, placed on the operating table in the left lateral decubitus position and a universal timeout was performed. Sequential compression devices were placed on both lower extremities and general endotracheal anesthesia was induced.  The patient was given IV antibiotics and Wolff catheter.  The abdomen was shaved, prepped, and draped in the usual  sterile fashion.    We made a 6 cm periumbilical midline incision and carried it down thru the linea alba.  The peritoneum was opened under direct vision.  The hand port was put into position and a right lower quadrant 10 mm port was placed over a trocar with hand assistance.  Pneumoperitoneum with CO2 was provided to 12 mmHg.  General survey with the laparoscope revealed no unusual findings.  An additional 10 mm port was placed just to the right of the midline in the upper abdomen under direct vision.    The right colon was released from its lateral attachments and rotated medially, revealing the kidney, ureter, duodenum and vena cava. The ureter was circumferentially dissected free distally then proximally taking care to preserve its vasculature.  We  identified the gonadal vein and dissected enbloc with the ureter. The proximal gonadal vein was ligated and divided near the insertion into the cava.      We placed a 5 mm port in the right flank for a retractor, placed to lift the liver and gallbladder anteriorly. The duodenum was fully Kocherized and the anterior surface of the vena cava was cleared of investing tissue. Gerota's fascia was incised along the upper pole of the kidney and a plane was created between the kidney and the adrenal gland with the harmonic scalpel. The renal vein was then circumferentially cleared of extraneous tissue. The lower aspect of the renal artery was identified and cleared of investing lymphatics. We dissected  the kidney and ureter free posteriorly from the psoas up to the level of the renal artery.  The patient was given fluid, mannitol and Lasix, and the kidney was then dissected free from its lateral and superior attachments allowing full medial rotation. The renal artery was then circumferentially cleared of lymphatics and fat proximally toward its origin and behind the cava. The patient was heparinized and the distal ureter and gonadal vein were clipped and divided.  Good  urine flow was seen. A laparoscopic stapler was used fired across the renal artery and vein.     The kidney was delivered from the hand port, flushed, and taken to recipient room. Protamine was administered for full heparin reversal. Pneumoperitoneum was reestablished and hemostasis was obtained. Vascular transection sites were visualized and confirmed secure. The colon was placed back in its natural position. The 10 mm port sites were closed with 0-vicryl. The hand port was closed with 0-PDS. Skin incisions were irrigated and closed with 4-0 monocryl and Dermabond. Needle, sponge, and instrument counts were correct x2.  Faculty was present for key portions of the procedure. The patient tolerated the procedure well without apparent complications and was extubated and transferred to PACU in good condition.  ATTESTATION:    I was present during the key portions of the procedure, and I was immediately available for the entire procedure between opening and closing.    Cali Pérez MD, PhD  Assistant Professor of Surgery

## 2019-04-16 NOTE — OP NOTE
[facility] Brief Operative Note    Pre-operative diagnosis: Kidney Donor   Post-operative diagnosis * No post-op diagnosis entered *  same   Procedure: Procedure(s):  Laparoscopic Hand Assisted Living Related Kidney Donor   Surgeon(s): Surgeon(s) and Role:     * Cali Pérez MD - Primary     * Josse Holloway MD - Fellow - Assisting   Estimated blood loss: 100 mL    Specimens: * No specimens in log *   Findings: Normal right kidney- single vessels.

## 2019-04-16 NOTE — PHARMACY-ADMISSION MEDICATION HISTORY
Admission medication history interview status for the 4/16/2019 admission is complete. See Epic admission navigator for allergy information, pharmacy, prior to admission medications and immunization status.     Medication history interview sources:  Patient    Changes made to PTA medication list (reason)  Added: None  Deleted: Cholecalciferol 50,000 units po once weekly - patient no longer taking  Changed: None    Additional medication history information (including reliability of information, actions taken by pharmacist):  Patient not currently taking any medications prior to admission.      Prior to Admission medications    Not on File         Medication history completed by: Patricia Almonte, PharmD

## 2019-04-16 NOTE — PRE-PROCEDURE
Surgical Plan:    Prior to procedure we noted that there was confusion in the laterality of the kidney donation.  The CT shows small left punctate stone and 2 arteries on left.  Size mismatch Left>right.  Initial committee review planned LEFT sided nephrectomy.  Litholink stone panel suggests patient not a likely stone former.  Based on anatomy and size discrepancy plan was changed to RIGHT nephrectomy, but this was not the laterality on initial consent.    I met with Dr. Bauman to review the imaging this morning and we consulted with transplant nephrology to review litholink.  We decided that RIGHT side was more appropriate.      We discussed rationale and risks with patient as well as reviewed film with her.  She agrees that RIGHT side is appropriate.  Consent was updated by surgical team and initialled by us, the patient, and bedside nurse.      PLAN:  RIGHT donor nephrectomy.    Cali Pérez MD PhD

## 2019-04-16 NOTE — ANESTHESIA CARE TRANSFER NOTE
Patient: Elly Ballard    Procedure(s):  Laparoscopic Hand Assisted Living Related Kidney Donor    Diagnosis: Kidney Donor  Diagnosis Additional Information: No value filed.    Anesthesia Type:   No value filed.     Note:  Airway :Nasal Cannula  Patient transferred to:PACU  Comments: Patient oxygenating and ventilating well on 3LNC.  Patient YAN, resting comfortably, and VSS.  Report given to RN Handoff Report: Identifed the Patient, Identified the Reponsible Provider, Reviewed the pertinent medical history, Discussed the surgical course, Reviewed Intra-OP anesthesia mangement and issues during anesthesia, Set expectations for post-procedure period and Allowed opportunity for questions and acknowledgement of understanding      Vitals: (Last set prior to Anesthesia Care Transfer)    CRNA VITALS  4/16/2019 1314 - 4/16/2019 1351      4/16/2019             NIBP:  95/58    Pulse:  74    SpO2:  100 %    Resp Rate (observed):  13                Electronically Signed By: HERMINIO Galaviz CRNA  April 16, 2019  1:51 PM

## 2019-04-16 NOTE — PHARMACY-TRANSPLANT NOTE
D/I: 43 year old female s/p kidney donation surgery on 4/16/19.  Medications have been reviewed by the pharmacist for efficacy, appropriate dose, medication interactions and potential adverse effects.      A: Medications reviewed for this patient as above. No medication issues were noted.  P: Pharmacy will continue to monitor for any potential medication issues, and will make recommendations as appropriate. Medication therapy needs for discharge planning will continue to be addressed throughout the current admission via multidisciplinary rounds and order review.    Patricia Almonte, PharmD

## 2019-04-16 NOTE — ANESTHESIA PROCEDURE NOTES
Peripheral Nerve Block Procedure Note    Staff:     Anesthesiologist:  Solitario Cárdenas MD    Resident/CRNA:  Carlos Alberto Black MD    Block performed by resident/CRNA in the presence of a teaching physician    Location: Pre-op  Procedure Start/Stop TImes:      4/16/2019 6:30 AM     4/16/2019 6:45 AM    patient identified, IV checked, site marked, risks and benefits discussed, informed consent, monitors and equipment checked, pre-op evaluation, at physician/surgeon's request and post-op pain management      Correct Patient: Yes      Correct Position: Yes      Correct Site: Yes      Correct Procedure: Yes      Correct Laterality:  Yes    Site Marked:  Yes  Procedure details:     Procedure:  TAP    ASA:  3    Diagnosis:  Post operative pain    Laterality:  Bilateral    Position:  Supine    Sterile Prep: chloraprep, mask and sterile gloves      Needle gauge:  21    Needle length (inches):  3.13    Catheter gauge:  19    Ultrasound: Yes      Ultrasound used to identify targeted nerve, plexus, or vascular structure and placed a needle adjacent to it      Permanent Image entered into patiient's record      Abnormal pain on injection: No      Blood Aspirated: No      Paresthesias:  No    Bleeding at site: No      Bolus via:  Needle    Infusion Method:  Single Shot    Complications:  None

## 2019-04-16 NOTE — OR NURSING
Pt ready for transfer to . RN unavailable to take report d/t shift change. Will hold in PACU and continue to monitor.

## 2019-04-17 LAB
BUN SERPL-MCNC: 8 MG/DL (ref 7–30)
CREAT SERPL-MCNC: 1.07 MG/DL (ref 0.52–1.04)
GFR SERPL CREATININE-BSD FRML MDRD: 63 ML/MIN/{1.73_M2}
HCT VFR BLD AUTO: 34 % (ref 35–47)
HGB BLD-MCNC: 10.9 G/DL (ref 11.7–15.7)

## 2019-04-17 PROCEDURE — 85018 HEMOGLOBIN: CPT | Performed by: SURGERY

## 2019-04-17 PROCEDURE — 40000141 ZZH STATISTIC PERIPHERAL IV START W/O US GUIDANCE

## 2019-04-17 PROCEDURE — 25000132 ZZH RX MED GY IP 250 OP 250 PS 637: Performed by: SURGERY

## 2019-04-17 PROCEDURE — 25000128 H RX IP 250 OP 636: Performed by: PHYSICIAN ASSISTANT

## 2019-04-17 PROCEDURE — 12000026 ZZH R&B TRANSPLANT

## 2019-04-17 PROCEDURE — 25000132 ZZH RX MED GY IP 250 OP 250 PS 637: Performed by: PHYSICIAN ASSISTANT

## 2019-04-17 PROCEDURE — 82565 ASSAY OF CREATININE: CPT | Performed by: SURGERY

## 2019-04-17 PROCEDURE — 25000132 ZZH RX MED GY IP 250 OP 250 PS 637: Performed by: STUDENT IN AN ORGANIZED HEALTH CARE EDUCATION/TRAINING PROGRAM

## 2019-04-17 PROCEDURE — 85014 HEMATOCRIT: CPT | Performed by: SURGERY

## 2019-04-17 PROCEDURE — 36415 COLL VENOUS BLD VENIPUNCTURE: CPT | Performed by: SURGERY

## 2019-04-17 PROCEDURE — 84520 ASSAY OF UREA NITROGEN: CPT | Performed by: SURGERY

## 2019-04-17 PROCEDURE — 99231 SBSQ HOSP IP/OBS SF/LOW 25: CPT | Performed by: NURSE PRACTITIONER

## 2019-04-17 RX ORDER — HYDROXYZINE HYDROCHLORIDE 10 MG/1
10 TABLET, FILM COATED ORAL 3 TIMES DAILY PRN
Status: DISCONTINUED | OUTPATIENT
Start: 2019-04-17 | End: 2019-04-19 | Stop reason: HOSPADM

## 2019-04-17 RX ORDER — BISACODYL 10 MG
10 SUPPOSITORY, RECTAL RECTAL DAILY PRN
Status: DISCONTINUED | OUTPATIENT
Start: 2019-04-18 | End: 2019-04-19

## 2019-04-17 RX ORDER — METHOCARBAMOL 500 MG/1
500 TABLET, FILM COATED ORAL 4 TIMES DAILY PRN
Status: DISCONTINUED | OUTPATIENT
Start: 2019-04-17 | End: 2019-04-19 | Stop reason: HOSPADM

## 2019-04-17 RX ORDER — KETOROLAC TROMETHAMINE 15 MG/ML
15 INJECTION, SOLUTION INTRAMUSCULAR; INTRAVENOUS EVERY 6 HOURS
Status: DISCONTINUED | OUTPATIENT
Start: 2019-04-17 | End: 2019-04-18

## 2019-04-17 RX ORDER — KETOROLAC TROMETHAMINE 15 MG/ML
15 INJECTION, SOLUTION INTRAMUSCULAR; INTRAVENOUS EVERY 6 HOURS PRN
Status: DISCONTINUED | OUTPATIENT
Start: 2019-04-17 | End: 2019-04-18

## 2019-04-17 RX ORDER — BISACODYL 10 MG
10 SUPPOSITORY, RECTAL RECTAL ONCE
Status: DISCONTINUED | OUTPATIENT
Start: 2019-04-17 | End: 2019-04-18 | Stop reason: CLARIF

## 2019-04-17 RX ORDER — HYDROMORPHONE HYDROCHLORIDE 2 MG/1
2 TABLET ORAL
Status: DISCONTINUED | OUTPATIENT
Start: 2019-04-17 | End: 2019-04-19 | Stop reason: HOSPADM

## 2019-04-17 RX ADMIN — ACETAMINOPHEN 975 MG: 325 TABLET, FILM COATED ORAL at 23:43

## 2019-04-17 RX ADMIN — SENNOSIDES AND DOCUSATE SODIUM 2 TABLET: 8.6; 5 TABLET ORAL at 07:41

## 2019-04-17 RX ADMIN — METHOCARBAMOL 500 MG: 500 TABLET, FILM COATED ORAL at 14:46

## 2019-04-17 RX ADMIN — ACETAMINOPHEN 975 MG: 325 TABLET, FILM COATED ORAL at 16:04

## 2019-04-17 RX ADMIN — HYDROMORPHONE HYDROCHLORIDE 2 MG: 2 TABLET ORAL at 00:05

## 2019-04-17 RX ADMIN — RANITIDINE 150 MG: 150 TABLET ORAL at 07:42

## 2019-04-17 RX ADMIN — HYDROMORPHONE HYDROCHLORIDE 2 MG: 2 TABLET ORAL at 04:34

## 2019-04-17 RX ADMIN — ACETAMINOPHEN 975 MG: 325 TABLET, FILM COATED ORAL at 07:42

## 2019-04-17 RX ADMIN — SENNOSIDES AND DOCUSATE SODIUM 2 TABLET: 8.6; 5 TABLET ORAL at 20:34

## 2019-04-17 RX ADMIN — METHOCARBAMOL 500 MG: 500 TABLET, FILM COATED ORAL at 11:18

## 2019-04-17 RX ADMIN — KETOROLAC TROMETHAMINE 15 MG: 15 INJECTION, SOLUTION INTRAMUSCULAR; INTRAVENOUS at 20:34

## 2019-04-17 RX ADMIN — HYDROMORPHONE HYDROCHLORIDE 2 MG: 2 TABLET ORAL at 16:04

## 2019-04-17 RX ADMIN — HYDROMORPHONE HYDROCHLORIDE 2 MG: 2 TABLET ORAL at 10:10

## 2019-04-17 RX ADMIN — KETOROLAC TROMETHAMINE 15 MG: 15 INJECTION, SOLUTION INTRAMUSCULAR; INTRAVENOUS at 14:25

## 2019-04-17 RX ADMIN — HYDROMORPHONE HYDROCHLORIDE 2 MG: 2 TABLET ORAL at 13:27

## 2019-04-17 RX ADMIN — HYDROMORPHONE HYDROCHLORIDE 2 MG: 2 TABLET ORAL at 07:42

## 2019-04-17 RX ADMIN — HYDROMORPHONE HYDROCHLORIDE 2 MG: 2 TABLET ORAL at 19:04

## 2019-04-17 RX ADMIN — RANITIDINE 150 MG: 150 TABLET ORAL at 20:34

## 2019-04-17 ASSESSMENT — MIFFLIN-ST. JEOR: SCORE: 1282.3

## 2019-04-17 ASSESSMENT — ACTIVITIES OF DAILY LIVING (ADL)
ADLS_ACUITY_SCORE: 12
ADLS_ACUITY_SCORE: 13
ADLS_ACUITY_SCORE: 12
ADLS_ACUITY_SCORE: 12
ADLS_ACUITY_SCORE: 13
ADLS_ACUITY_SCORE: 12

## 2019-04-17 NOTE — PLAN OF CARE
[id number]  VS - A&O, RA, afebrile, capno monitoring. BP in am 84/53 when awoken from sleep - asymptomatic, pt then sat at edge of bed and ambulated a short distance, does not complain of dizziness. Recheck pressure 109/69. HR 50-70s  Pain - incisional pain controlled with ice pack, scheduled tylenol and PRN 2mg PO dilaudid   Nausea - denies  Diet - regular diet, fair appetite   LDA - L and R wrist/hand PIVs SL, CDI  IVF - IVF dc'd due to adequate PO intake   Resp - denies SOB  GI - Last BM prior to surgery, denies passing gas, hypoactive BS   - aguilar with 2000 ccs light yellow urine. Aguilar removed per order at 0615   Skin - abd midline incision with dermabond CDI, no drainage. 3 X lap sites with dermabond, CDI with no drainge - abd binder in place  Mobility - SBA - ambulated short distance in hallway overnight  Plan - Continue to monitor and follow poc.

## 2019-04-17 NOTE — PLAN OF CARE
"BP 92/61   Pulse 62   Temp 98.2  F (36.8  C) (Oral)   Resp 20   Ht 1.702 m (5' 7\")   Wt 59.5 kg (131 lb 1.6 oz)   LMP 04/09/2019   SpO2 100%   BMI 20.53 kg/m       VS: BPs lower; patient baseline. All other VSS on RA   Neuro: orientated x4  Mobility: Up ad conrado. Patient has ambulated in room and hallway independently  Pain/Nausea/Vomiting: Denies nausea; pain control has been an issue. Given oral 2 mg of dilaudid 2x; robaxin 1x (can get 4 doses in total daily). Pain incisional; and patient feels like it is pressure pain as well. Has not passed gas yet.  Diet: Regular; fair appetite and PO intake.  LDAs: PIV saline locked. Wolff pulled this AM by nightshift; patient has voided since then and bladder scanned for 0.  Skin: Incision dermabonded; dry and intact.   GI/: Adequate urine output; saves most in hat but misses sometimes- per patient report. No BM; not passing gas. Refused scheduled suppository this morning. Patient may reconsider.   Labs: Creatinine has increased;1.06  Plan: Will continue to monitor and notify pancreas of any changes.       "

## 2019-04-17 NOTE — PROGRESS NOTES
"REGIONAL ANESTHESIA PAIN SERVICE  SUBJECTIVE  Interval history: Patient reports pain adequately controlled with nerve block and current analgesics (see below).  Currently rating pain 4/10 at rest and 7/10 with activity and feels more \"gas pain\".  Tolerating regular diet,  denies nausea.  Denies any weakness, paresthesias, circumoral numbness, metallic taste or tinnitus.       Clinically Aligned Pain Assessment (CAPA):  Comfort (How is your pain?): Tolerable with discomfort  Change in Pain (Since your last medication/intervention?): About the same  Pain Control (How are your pain treatments working?):  Partially effective pain control    Medications related to Pain Management (From now, onward)    Start     Dose/Rate Route Frequency Ordered Stop    04/18/19 0000  bisacodyl (DULCOLAX) Suppository 10 mg      10 mg Rectal DAILY PRN 04/17/19 1050      04/17/19 1100  bisacodyl (DULCOLAX) Suppository 10 mg      10 mg Rectal ONCE 04/17/19 1050      04/17/19 1045  methocarbamol (ROBAXIN) tablet 500 mg      500 mg Oral 4 TIMES DAILY PRN 04/17/19 1045      04/17/19 0507  HYDROmorphone (DILAUDID) tablet 2 mg      2 mg Oral EVERY 3 HOURS PRN 04/17/19 0507      04/16/19 1615  acetaminophen (TYLENOL) tablet 975 mg      975 mg Oral EVERY 8 HOURS 04/16/19 1604 04/19/19 1614    04/16/19 1604  senna-docusate (SENOKOT-S/PERICOLACE) 8.6-50 MG per tablet 1 tablet      1 tablet Oral 2 TIMES DAILY 04/16/19 1604      04/16/19 1604  senna-docusate (SENOKOT-S/PERICOLACE) 8.6-50 MG per tablet 2 tablet      2 tablet Oral 2 TIMES DAILY 04/16/19 1604      04/16/19 1604  fentaNYL (PF) (SUBLIMAZE) injection 10-20 mcg      10-20 mcg Intravenous EVERY 1 HOUR PRN 04/16/19 1604      04/16/19 1404  bupivacaine liposome (EXPAREL) LONG ACTING injection was administered into the infiltration site to produce postsurgical analgesia. Duration of action is up to 72 hours, and other \"esau\" medications should not be given for 96 hours with the exception of the " "lidocaine 5% patch (LIDODERM) and the lidocaine 10mg in potassium infusions. This entry is for INFORMATION ONLY.       Does not apply CONTINUOUS PRN 04/16/19 1404 04/20/19 1403          OBJECTIVE:    BP 92/61   Pulse 62   Temp 98.2  F (36.8  C) (Oral)   Resp 20   Ht 1.702 m (5' 7\")   Wt 59.5 kg (131 lb 1.6 oz)   LMP 04/09/2019   SpO2 100%   BMI 20.53 kg/m    Exam:  General: alert and mild distress  Neuro: Strength BLE 5/5    ASSESSMENT/PLAN:    Elly Ballard is a 43 year old female kidney donor who is now POD #1 s/p LAPAROSCOPIC DONOR HAND ASSISTED KIDNEY LIVING RELATED with single shot injection bilateral transversus abdominis plane (TAP) nerve block.  Total bupivacaine 0.25% 20 mL and liposomal (long-acting) bupivacaine (Exparel) 1.3% 20 mL administered 4/16/19 for postop pain control.  Patient is ambulating without difficulty.  No weakness or paresthesias.  No evidence of adverse side effects associated with nerve block injections.  Acheiving adequate pain control, with nerve block, oral nonopioid and opioid analgesics.  Anticipate 48-72 hours of pain control with long-acting local anesthetic. Additionally, patient will continue to require multimodal analgesia for visceral/muscle pain not controlled with long-acting local anesthetic.      - NO other local anesthetic use within 96 hours of liposomal bupivacaine (Exparel), unless approved by [facility]  - patient received verbal and written instructions about liposomal bupivacaine and counseling about pharmacologic and nonpharmacologic measures for acute postoperative pain management  - please call [facility] if questions or concerns    HERMINIO Fuentes [facility]  Regional Anesthesia Pain Service ([facility])  4/17/2019 12:25 PM    [facility] Contact Info (for in-house use only):  Job code ID: [email or web address] [id number]   [place] 0599  Peds [phone]  [place] phone: dial [phone], enter jobcode ID, then enter call-back number.    Text: Use [employer]COM on the Intranet <Paging/Directory> tab and enter " Jobcode ID.   If no call back at any time, contact the hospital  and ask for [facility]APS attending or backup

## 2019-04-17 NOTE — PLAN OF CARE
"/64   Pulse 65   Temp 97.5  F (36.4  C) (Oral)   Resp 16   Ht 1.702 m (5' 7\")   Wt 57.7 kg (127 lb 3.3 oz)   LMP 04/09/2019   SpO2 97%   BMI 19.92 kg/m      [id number]: Pt up from PACU around 1600. Since arriving on the unit, pt has been afebrile. OVSS on RA. CAPNO: EtCO2 38-40, IPI 8-10. Pt complaining of some abdominal discomfort, PRN Dilaudid 2 mg given x2 along with patients scheduled dose of Tylenol and ice applied. Pt on regular diet, no complaints of nausea. PIV L Hand saline locked, MIVF stopped due to the fact that the patient has been taking in adequate amounts of PO intake. PIV R Hand saline locked. Wolff catheter with adequate amounts of urine output, 1725 mL urine output emptied this shift. Midline abdominal incision and 3 lap sites on R side of abdomen c/d/I, approximated with liquid bandages, no drainage, and open to air. Pt wearing abdominal binder. No BM this shift, bowel sounds hypoactive, and patient is not passing flatus. Pt not out of bed this shift, pt educated that she will have to get up and start walking tomorrow. Call light in reach. Will continue to monitor and follow plan of care.   "

## 2019-04-17 NOTE — PROGRESS NOTES
LIVING DONOR SOCIAL WORK AND INDEPENDENT LIVING DONOR ADVOCATE NOTE:  D:  Ms. Elly Ballard is a living kidney donor, POD #1.  I:  I met with her at bedside to thank her for her donation and to assess for any psychosocial needs and to assist with discharge planning.  I assessed the patient's mood/affect, plans for recovery, and any feelings of regret/remorse regarding donation.   A:  Elly is sitting upright in a chair in her room when I arrived.  Her  is at her side and very supportive.  She appears to be in a neutral mood and affect is congruent.  She states that pain is marginally controlled.  She reports that her recipient is doing well so far.  She has been not able to see her sister, the recipient, yet, but plans to later today.  Patient describes donation recovery as well so far.  She and I discussed how to reach me should she have any post operative psychosocial needs.  She reports no feelings of regret or remorse about donation.  She denies any discharge planning needs at this time.  Patient plans to discharge to home with the care and support of her family.   P: Donor /EDWIN will remain available to assist with any psychosocial and advocacy needs, both inpatient and outpatient, as needed.  Patient is aware of follow-up recommendations and has my contact information.    ROMELIA Manley, [facility]  Clinical  and Independent Donor Advocate  [facility]  Pager:  [phone]  Direct:  [phone]

## 2019-04-17 NOTE — PROGRESS NOTES
Transplant Surgery  Inpatient Daily Progress Note  04/17/2019    Ms. Ballard is Post-operative day #1 s/p laparoscopic right donor nephrectomy. Issues Overnight: None     Patient Vitals for the past 24 hrs:   BP Temp Temp src Pulse Heart Rate Resp SpO2 Weight   04/17/19 0700 91/64 -- -- -- -- -- -- 59.5 kg (131 lb 1.6 oz)   04/17/19 0657 (!) 83/57 98.3  F (36.8  C) -- 62 -- 22 97 % --   04/17/19 0346 109/69 -- -- -- 56 -- -- --   04/17/19 0326 (!) 84/53 98.5  F (36.9  C) Oral -- 60 16 96 % --   04/16/19 2333 95/62 97.5  F (36.4  C) Axillary -- 72 16 96 % --   04/16/19 1946 105/64 97.5  F (36.4  C) Oral -- 70 16 97 % --   04/16/19 1800 105/70 -- -- -- 65 18 98 % --   04/16/19 1730 104/67 -- -- -- 67 -- -- --   04/16/19 1700 111/73 -- -- -- 78 18 99 % --   04/16/19 1645 112/67 -- -- -- 73 -- -- --   04/16/19 1630 113/79 -- -- -- 82 14 99 % --   04/16/19 1615 106/68 -- -- -- 73 17 95 % --   04/16/19 1600 103/64 98.1  F (36.7  C) Oral -- 62 16 100 % --   04/16/19 1550 -- -- -- -- -- -- 100 % --   04/16/19 1530 100/54 -- -- 65 64 16 100 % --   04/16/19 1520 93/58 98.5  F (36.9  C) -- 78 80 16 100 % --   04/16/19 1515 97/58 -- -- 73 73 15 100 % --   04/16/19 1500 91/57 -- -- 74 70 14 100 % --   04/16/19 1445 97/59 -- -- 77 77 14 100 % --   04/16/19 1430 106/59 -- -- 85 77 14 100 % --   04/16/19 1415 99/59 98  F (36.7  C) Oral 80 82 16 100 % --   04/16/19 1400 106/57 -- -- 74 61 15 100 % --   04/16/19 1348 95/58 98  F (36.7  C) Oral 74 69 14 100 % --       Pain: Visual Analog Score: NA  Nausea:    [x]    None      []    Some, but not needing medication    []    Yes, needing medication      []    Dry Retching    []    Vomited    DREAMS Performance:    Drinking: Yes   Eating: Yes   Analgesia: Yes   Mobilizing:  Yes   Slept: Yes    Passed flatus? No  Incision(s): C/D/I with no yvonne-incisional ecchymoses  Abdomen: mild distention, appropriately tender, soft  Extremities: no cyanosis or edema     Allergies:   Allergies  "  Allergen Reactions     Seasonal Allergies        Medications:  Hospital Medications as of 4/17/2019       Dose Frequency Start End    acetaminophen (TYLENOL) tablet 975 mg 975 mg EVERY 8 HOURS 4/16/2019 4/19/2019    Sig: Take 3 tablets (975 mg) by mouth every 8 hours    Class: E-Prescribe    Route: Oral    bupivacaine liposome (EXPAREL) LONG ACTING injection was administered into the infiltration site to produce postsurgical analgesia. Duration of action is up to 72 hours, and other \"esau\" medications should not be given for 96 hours with the exception of the lidocaine 5% patch (LIDODERM) and the lidocaine 10mg in potassium infusions. This entry is for INFORMATION ONLY.  CONTINUOUS PRN 4/16/2019 4/20/2019    Sig: continuous prn    Class: E-Prescribe    Route: Does not apply    fentaNYL (PF) (SUBLIMAZE) injection 10-20 mcg 10-20 mcg EVERY 1 HOUR PRN 4/16/2019     Sig: Inject 0.2-0.4 mLs (10-20 mcg) into the vein every hour as needed for other (For optimal opioid multimodal pain management to improve pain control and physical function.)    Route: Intravenous    HYDROmorphone (DILAUDID) tablet 2 mg 2 mg EVERY 3 HOURS PRN 4/17/2019     Sig: Take 1 tablet (2 mg) by mouth every 3 hours as needed for moderate to severe pain    Route: Oral    methocarbamol (ROBAXIN) tablet 500 mg 500 mg 4 TIMES DAILY PRN 4/17/2019     Sig: Take 1 tablet (500 mg) by mouth 4 times daily as needed for muscle spasms    Class: E-Prescribe    Route: Oral    metoclopramide (REGLAN) injection 10 mg 10 mg EVERY 6 HOURS PRN 4/16/2019     Sig: Inject 2 mLs (10 mg) into the vein every 6 hours as needed for nausea or vomiting    Class: E-Prescribe    Route: Intravenous    Linked Group 1:  \"Or\" Linked Group Details        metoclopramide (REGLAN) tablet 10 mg 10 mg EVERY 6 HOURS PRN 4/16/2019     Sig: Take 1 tablet (10 mg) by mouth every 6 hours as needed for nausea or vomiting    Class: E-Prescribe    Route: Oral    Linked Group 1:  \"Or\" Linked Group " "Details        naloxone (NARCAN) injection 0.1-0.4 mg 0.1-0.4 mg EVERY 2 MIN PRN 4/16/2019 4/17/2019    Sig: Inject 0.25-1 mLs (0.1-0.4 mg) into the vein every 2 minutes as needed for opioid reversal    Class: E-Prescribe    Route: Intravenous    naloxone (NARCAN) injection 0.1-0.4 mg 0.1-0.4 mg EVERY 2 MIN PRN 4/17/2019     Sig: Inject 0.25-1 mLs (0.1-0.4 mg) into the vein every 2 minutes as needed for opioid reversal    Class: E-Prescribe    Route: Intravenous    ondansetron (ZOFRAN) injection 4 mg 4 mg EVERY 6 HOURS PRN 4/16/2019     Sig: Inject 2 mLs (4 mg) into the vein every 6 hours as needed for nausea or vomiting    Class: E-Prescribe    Route: Intravenous    Linked Group 2:  \"Or\" Linked Group Details        ondansetron (ZOFRAN-ODT) ODT tab 4 mg 4 mg EVERY 6 HOURS PRN 4/16/2019     Sig: Take 1 tablet (4 mg) by mouth every 6 hours as needed for nausea or vomiting    Class: E-Prescribe    Route: Oral    Linked Group 2:  \"Or\" Linked Group Details        prochlorperazine (COMPAZINE) injection 10 mg 10 mg EVERY 6 HOURS PRN 4/16/2019     Sig: Inject 2 mLs (10 mg) into the vein every 6 hours as needed for nausea or vomiting    Class: E-Prescribe    Route: Intravenous    Linked Group 3:  \"Or\" Linked Group Details        prochlorperazine (COMPAZINE) tablet 10 mg 10 mg EVERY 6 HOURS PRN 4/16/2019     Sig: Take 1 tablet (10 mg) by mouth every 6 hours as needed for nausea or vomiting    Class: E-Prescribe    Route: Oral    Linked Group 3:  \"Or\" Linked Group Details        protamine injection 50 mg (Completed) 50 mg ONCE 4/16/2019 4/16/2019    Sig: Inject 5 mLs (50 mg) into the vein once    Class: E-Prescribe    Route: Intravenous    ranitidine (ZANTAC) tablet 150 mg 150 mg 2 TIMES DAILY 4/16/2019     Sig: Take 1 tablet (150 mg) by mouth 2 times daily    Class: E-Prescribe    Route: Oral    senna-docusate (SENOKOT-S/PERICOLACE) 8.6-50 MG per tablet 1 tablet 1 tablet 2 TIMES DAILY 4/16/2019     Sig: Take 1 tablet by mouth " "2 times daily    Class: E-Prescribe    Route: Oral    Linked Group 4:  \"Or\" Linked Group Details        senna-docusate (SENOKOT-S/PERICOLACE) 8.6-50 MG per tablet 2 tablet 2 tablet 2 TIMES DAILY 4/16/2019     Sig: Take 2 tablets by mouth 2 times daily    Class: E-Prescribe    Route: Oral    Linked Group 4:  \"Or\" Linked Group Details              Labs:  Cr 1.07. Hbg 10.9    Assessment: Post-operative day #1, doing well. C/o of abdominal muscle soreness.     Plan:   -Encouraged ambulation and ISB   -Continue GI and DVT prophylaxis  -Pain control: Scheduled acetaminophen. Encourage oral analgesia prn. Will add methocarbamol PRN. RN will give one dose now.   -Removed aguilar  -Discontinued IV fluid  -Senna/colace scheduled BID. Ordered dulcolax supp for today.     Discharge planning: Possible tomorrow.    Courtney Silva PA-C  Division of Transplantation  Pager [phone]    "

## 2019-04-18 ENCOUNTER — CARE COORDINATION (OUTPATIENT)
Dept: TRANSPLANT | Facility: CLINIC | Age: 44
End: 2019-04-18

## 2019-04-18 PROCEDURE — 25000132 ZZH RX MED GY IP 250 OP 250 PS 637: Performed by: PHYSICIAN ASSISTANT

## 2019-04-18 PROCEDURE — 12000026 ZZH R&B TRANSPLANT

## 2019-04-18 PROCEDURE — 25000132 ZZH RX MED GY IP 250 OP 250 PS 637: Performed by: SURGERY

## 2019-04-18 PROCEDURE — 25000128 H RX IP 250 OP 636: Performed by: PHYSICIAN ASSISTANT

## 2019-04-18 RX ORDER — ACETAMINOPHEN 325 MG/1
650 TABLET ORAL EVERY 6 HOURS
Qty: 100 TABLET | Refills: 1 | Status: CANCELLED | OUTPATIENT
Start: 2019-04-18

## 2019-04-18 RX ORDER — METHOCARBAMOL 500 MG/1
500 TABLET, FILM COATED ORAL 4 TIMES DAILY PRN
Qty: 10 TABLET | Refills: 1 | Status: CANCELLED | OUTPATIENT
Start: 2019-04-18

## 2019-04-18 RX ORDER — ACETAMINOPHEN 325 MG/1
975 TABLET ORAL EVERY 8 HOURS
Status: DISCONTINUED | OUTPATIENT
Start: 2019-04-18 | End: 2019-04-19 | Stop reason: HOSPADM

## 2019-04-18 RX ORDER — HYDROXYZINE HYDROCHLORIDE 10 MG/1
10 TABLET, FILM COATED ORAL 3 TIMES DAILY PRN
Qty: 8 TABLET | Refills: 1 | Status: CANCELLED | OUTPATIENT
Start: 2019-04-18

## 2019-04-18 RX ORDER — HYDROMORPHONE HYDROCHLORIDE 2 MG/1
2 TABLET ORAL EVERY 6 HOURS PRN
Qty: 8 TABLET | Refills: 0 | Status: CANCELLED | OUTPATIENT
Start: 2019-04-18

## 2019-04-18 RX ORDER — AMOXICILLIN 250 MG
1 CAPSULE ORAL 2 TIMES DAILY
Qty: 100 TABLET | Refills: 1 | Status: CANCELLED | OUTPATIENT
Start: 2019-04-18

## 2019-04-18 RX ADMIN — SENNOSIDES AND DOCUSATE SODIUM 2 TABLET: 8.6; 5 TABLET ORAL at 19:38

## 2019-04-18 RX ADMIN — RANITIDINE 150 MG: 150 TABLET ORAL at 08:01

## 2019-04-18 RX ADMIN — ACETAMINOPHEN 975 MG: 325 TABLET, FILM COATED ORAL at 16:38

## 2019-04-18 RX ADMIN — METHOCARBAMOL 500 MG: 500 TABLET, FILM COATED ORAL at 09:24

## 2019-04-18 RX ADMIN — RANITIDINE 150 MG: 150 TABLET ORAL at 19:37

## 2019-04-18 RX ADMIN — KETOROLAC TROMETHAMINE 15 MG: 15 INJECTION, SOLUTION INTRAMUSCULAR; INTRAVENOUS at 08:00

## 2019-04-18 RX ADMIN — HYDROMORPHONE HYDROCHLORIDE 2 MG: 2 TABLET ORAL at 14:18

## 2019-04-18 RX ADMIN — HYDROXYZINE HYDROCHLORIDE 10 MG: 10 TABLET ORAL at 09:24

## 2019-04-18 RX ADMIN — ACETAMINOPHEN 975 MG: 325 TABLET, FILM COATED ORAL at 08:00

## 2019-04-18 RX ADMIN — SENNOSIDES AND DOCUSATE SODIUM 2 TABLET: 8.6; 5 TABLET ORAL at 08:00

## 2019-04-18 RX ADMIN — KETOROLAC TROMETHAMINE 15 MG: 15 INJECTION, SOLUTION INTRAMUSCULAR; INTRAVENOUS at 01:40

## 2019-04-18 RX ADMIN — METHOCARBAMOL 500 MG: 500 TABLET, FILM COATED ORAL at 19:37

## 2019-04-18 ASSESSMENT — ACTIVITIES OF DAILY LIVING (ADL)
ADLS_ACUITY_SCORE: 12
ADLS_ACUITY_SCORE: 13
ADLS_ACUITY_SCORE: 12
ADLS_ACUITY_SCORE: 12

## 2019-04-18 ASSESSMENT — MIFFLIN-ST. JEOR: SCORE: 1270.96

## 2019-04-18 NOTE — DISCHARGE SUMMARY
[facility], [place]    Discharge Summary  Solid Organ Transplant Surgery    Date of Admission:  4/16/2019  Date of Discharge:  4/19/2019  Date of Service (when I saw the patient): 04/19/19    Discharge Diagnoses   Active Problems:    Encounter for donation of kidney      Procedure/Surgery Information   Procedure: Procedure(s):  Laparoscopic Hand Assisted Living Related Kidney Donor   Surgeon(s): Surgeon(s) and Role:     * Cali Pérez MD - Primary     * Josse Holloway MD - Fellow - Assisting             History of Present Illness   Elly Ballard is a 43 year old female who presented for nephrectomy for kidney donation.    Hospital Course   Patient was ambulating and tolerating regular diet. She was passing flatus and had 1 bowel movement prior to discharge. On POD #2, a rash was present under her abdominal binder, rash was slightly improved on POD #3. Abdominal binder was not used due to rash. She was discharged on POD#3, she was inpatient an extra day due to pain control issue.     Courtney Silva PA-C    Post-operative pain control: Included bupivacaine injection, hydromorphone (dilaudid) IV, Toradol, oral dilaudid and acetaminophen and will be dilaudid and tylenol on discharge.  The patient was instructed to avoid NSAID medications. She was discharged on Senna/colace BID and MOM PRN for bowel regimen.     Discharge Disposition   Discharged to home   Condition at discharge: Stable    Primary Care Physician   Nika Briscoe    Passed flatus? Yes  Skin: Erythematous rash abdomen, demarcated previous abdominal binder outline. Slightly less erythematous.  Incision(s): C/D/I with no yvonne-incisional ecchymoses  Abdomen: mild distention, appropriately tender, soft  Extremities: no cyanosis or edema     Consultations This Hospital Stay   PHARMACY IP CONSULT  SOT MEDICATION HISTORY IP PHARMACY CONSULT  VASCULAR ACCESS CARE ADULT IP CONSULT    Time Spent on  this Encounter   I have spent less than 30 minutes on this discharge.    Discharge Orders   No discharge procedures on file.  Discharge Medications   There are no discharge medications for this patient.    Allergies   Allergies   Allergen Reactions     Seasonal Allergies      Data   Most Recent 3 CBC's:  Recent Labs   Lab Test 04/17/19  0625 04/16/19  1420 04/16/19  0613  01/04/19  0749  04/19/18  1040 12/11/16  1246   WBC  --   --   --   --  5.5  --  9.3 9.8   HGB 10.9* 11.2* 12.6   < > 13.4   < > 13.1 12.8   MCV  --   --   --   --  89  --  91 88   PLT  --   --   --   --  223  --  206 199    < > = values in this interval not displayed.      Most Recent 3 BMP's:  Recent Labs   Lab Test 04/17/19  0625 04/16/19  0613 04/08/19  1110 01/04/19  0749 11/20/18  0859 12/11/16  1246   NA  --   --   --  140  --  143   POTASSIUM  --  3.2*  --  3.6  --  3.5   CHLORIDE  --   --   --  107  --  107   CO2  --   --   --  27  --  27   BUN 8  --   --  8  --  11   CR 1.07* 0.62 0.59 0.62 0.66 0.80   ANIONGAP  --   --   --  6  --  9   ROJAS  --   --   --  8.4*  --  8.6   GLC  --   --   --  86 79 116*     Most Recent 2 LFT's:  Recent Labs   Lab Test 01/04/19  0749 12/11/16  1246   AST 14 19   ALT 20 16   ALKPHOS 44 47   BILITOTAL 0.5 0.5     Most Recent INR's and Anticoagulation Dosing History:  Anticoagulation Dose History     Recent Dosing and Labs Latest Ref Rng & Units 1/4/2019    INR 0.86 - 1.14 1.10        Most Recent 3 Troponin's:No lab results found.  Most Recent Cholesterol Panel:  Recent Labs   Lab Test 01/04/19  0749   CHOL 200*   *   HDL 65   TRIG 99     Most Recent 6 Bacteria Isolates From Any Culture (See EPIC Reports for Culture Details):  Recent Labs   Lab Test 12/20/16  1041 12/11/16  1840   CULT No growth 50,000 to 100,000 colonies/mL Klebsiella pneumoniae  <10,000 colonies/mL urogenital preston Susceptibility testing not routinely done  *     Most Recent TSH, T4 and A1c Labs:  Recent Labs   Lab Test 01/04/19  0732  04/19/18  1040   TSH  --  3.38   A1C 5.2  --

## 2019-04-18 NOTE — PROGRESS NOTES
"SPIRITUAL HEALTH SERVICES  SPIRITUAL ASSESSMENT Progress Note  [facility] ([place]) [address]     REFERRAL SOURCE: [facility] introduction      I had an introductory visit with Elly which included a brief reflective conversation.    Elly shared that she was \"grateful\" to have been able to donate her kidney to her sister \"before she needed dialysis.\"    She acknowledged that the extent of the procedure had been more \"gory\" than she had envisioned.    In my acknowledging the gift that she had given her sister, Elly shared that she \"had not fully processed that yet.\"    Elly identifies as [facility] and is a member of [facility] [employer] [place]. Her  Rev. Dr. Juju Cooper, had just been by to visit and is currently visiting her sister.    Elly and her sister are well supported by their [facility] community but Elly understands that  support is available to her if she desires it.    PLAN: [facility] remains available to support Elly as needed.    Andrez Gonzalez  Chaplain Resident  Pager [phone]    "

## 2019-04-18 NOTE — PLAN OF CARE
"/65 (BP Location: Left arm)   Pulse 64   Temp 98.6  F (37  C) (Oral)   Resp 16   Ht 1.702 m (5' 7\")   Wt 59.5 kg (131 lb 1.6 oz)   LMP 04/09/2019   SpO2 98%   BMI 20.53 kg/m      VS: BP at baseline; runs lower; AVSS on RA   Neuro: orientated x4  Mobility: Up ad conrado  Pain/Nausea/Vomiting: Pain managed with IV toradol (med discontinued), PO robaxin and atarax and PO dilaudid. Denies nausea  Diet: Regular; good appetite and intake  LDAs: PIV saline locked  Skin: Incision midline; dermabonded. WDL ex; around incision; rash and patient claims is itchy. Atarax helped with this and pain/anxiety. Abdominal binder should be worn over clothes now; instead of under- per MD recommendation  GI/: Great urine output; saves in hat. Bm this afternoon. Passing gas.  Plan: Anticipated to discharge tomorrow. Patient has made great progress with pain tolerance and management today. Will continue to monitor and notify pancreas of any changes. Patient will be sent home with dilaudid, atarax, and robaxin. Patient is aware.      "

## 2019-04-18 NOTE — PLAN OF CARE
"BP 91/62 (BP Location: Left arm)   Pulse 62   Temp 98.1  F (36.7  C) (Oral)   Resp 20   Ht 1.702 m (5' 7\")   Wt 59.5 kg (131 lb 1.6 oz)   LMP 04/09/2019   SpO2 96%   BMI 20.53 kg/m      4424-2133: Afebrile. Pt hypotensive (90s/60s), baseline per pt report. OVSS on RA. Pt complaining of abdominal discomfort throughout the shift, PRN Dilaudid given x2 along with patients scheduled doses of Tylenol and Toradol. Pt on regular diet, pt was able to eat some dinner,  no complaints of nausea. PIV L Forearm SL. Midline abdominal incision and 3 lap sites on R side of abdomen c/d/I, approximated with liquid bandages, no drainage, and open to air. Pt wearing abdominal binder. Pt voiding adequate amounts, saving via hat in the bathroom. No BM this shift, pt is starting to pass some flatus. Pt up independently, pt ambulated around the unit and want to visit her sister. Plan for possible discharge tomorrow. Call light in reach. Will continue to monitor and follow plan of care.   "

## 2019-04-18 NOTE — PROGRESS NOTES
"Visited Elly on 7A.  She seemed very sleepy.  I provide \"Waht you should know after donation\" and briefly reviewed.  She said she was not ready to go home today and would like to stay another day.  I thanked her for her gift and will remain available for support.      Patient s discharge needs assessed and discharge planning has been conducted with the multidisciplinary transplant care team including physicians, pharmacy, social work and transplant coordinator.  "

## 2019-04-18 NOTE — PROGRESS NOTES
"Transplant Surgery  Inpatient Daily Progress Note  04/18/2019    Ms. Ballard is Post-operative day #2 s/p laparoscopic right donor nephrectomy. Issues Overnight: None     Patient Vitals for the past 24 hrs:   BP Temp Temp src Pulse Heart Rate Resp SpO2   04/18/19 0710 108/70 98.5  F (36.9  C) Oral 63 -- 16 98 %   04/18/19 0349 104/67 98.2  F (36.8  C) Oral -- 60 16 97 %   04/17/19 2339 107/68 97.7  F (36.5  C) Oral -- 62 16 97 %   04/17/19 1925 91/62 98.1  F (36.7  C) Oral -- 66 20 96 %   04/17/19 1530 93/62 97.5  F (36.4  C) Oral -- 61 20 100 %   04/17/19 1120 92/61 98.2  F (36.8  C) Oral -- 59 20 100 %       Pain: Visual Analog Score: NA  Nausea:    [x]    None      []    Some, but not needing medication    []    Yes, needing medication      []    Dry Retching    []    Vomited    DREAMS Performance:    Drinking: Yes   Eating: Yes   Analgesia: Yes   Mobilizing:  Yes   Slept: Yes    Passed flatus? No  Skin: Erythematous rash demarcated, area under abdominal binder  Incision(s): C/D/I with no yvonne-incisional ecchymoses  Abdomen: mild distention, appropriately tender, soft  Extremities: no cyanosis or edema     Allergies:   Allergies   Allergen Reactions     Seasonal Allergies        Medications:  Hospital Medications as of 4/17/2019       Dose Frequency Start End    acetaminophen (TYLENOL) tablet 975 mg 975 mg EVERY 8 HOURS 4/16/2019 4/19/2019    Sig: Take 3 tablets (975 mg) by mouth every 8 hours    Class: E-Prescribe    Route: Oral    bupivacaine liposome (EXPAREL) LONG ACTING injection was administered into the infiltration site to produce postsurgical analgesia. Duration of action is up to 72 hours, and other \"esau\" medications should not be given for 96 hours with the exception of the lidocaine 5% patch (LIDODERM) and the lidocaine 10mg in potassium infusions. This entry is for INFORMATION ONLY.  CONTINUOUS PRN 4/16/2019 4/20/2019    Sig: continuous prn    Class: E-Prescribe    Route: Does not apply    " "fentaNYL (PF) (SUBLIMAZE) injection 10-20 mcg 10-20 mcg EVERY 1 HOUR PRN 4/16/2019     Sig: Inject 0.2-0.4 mLs (10-20 mcg) into the vein every hour as needed for other (For optimal opioid multimodal pain management to improve pain control and physical function.)    Route: Intravenous    HYDROmorphone (DILAUDID) tablet 2 mg 2 mg EVERY 3 HOURS PRN 4/17/2019     Sig: Take 1 tablet (2 mg) by mouth every 3 hours as needed for moderate to severe pain    Route: Oral    methocarbamol (ROBAXIN) tablet 500 mg 500 mg 4 TIMES DAILY PRN 4/17/2019     Sig: Take 1 tablet (500 mg) by mouth 4 times daily as needed for muscle spasms    Class: E-Prescribe    Route: Oral    metoclopramide (REGLAN) injection 10 mg 10 mg EVERY 6 HOURS PRN 4/16/2019     Sig: Inject 2 mLs (10 mg) into the vein every 6 hours as needed for nausea or vomiting    Class: E-Prescribe    Route: Intravenous    Linked Group 1:  \"Or\" Linked Group Details        metoclopramide (REGLAN) tablet 10 mg 10 mg EVERY 6 HOURS PRN 4/16/2019     Sig: Take 1 tablet (10 mg) by mouth every 6 hours as needed for nausea or vomiting    Class: E-Prescribe    Route: Oral    Linked Group 1:  \"Or\" Linked Group Details        naloxone (NARCAN) injection 0.1-0.4 mg 0.1-0.4 mg EVERY 2 MIN PRN 4/16/2019 4/17/2019    Sig: Inject 0.25-1 mLs (0.1-0.4 mg) into the vein every 2 minutes as needed for opioid reversal    Class: E-Prescribe    Route: Intravenous    naloxone (NARCAN) injection 0.1-0.4 mg 0.1-0.4 mg EVERY 2 MIN PRN 4/17/2019     Sig: Inject 0.25-1 mLs (0.1-0.4 mg) into the vein every 2 minutes as needed for opioid reversal    Class: E-Prescribe    Route: Intravenous    ondansetron (ZOFRAN) injection 4 mg 4 mg EVERY 6 HOURS PRN 4/16/2019     Sig: Inject 2 mLs (4 mg) into the vein every 6 hours as needed for nausea or vomiting    Class: E-Prescribe    Route: Intravenous    Linked Group 2:  \"Or\" Linked Group Details        ondansetron (ZOFRAN-ODT) ODT tab 4 mg 4 mg EVERY 6 HOURS PRN " "4/16/2019     Sig: Take 1 tablet (4 mg) by mouth every 6 hours as needed for nausea or vomiting    Class: E-Prescribe    Route: Oral    Linked Group 2:  \"Or\" Linked Group Details        prochlorperazine (COMPAZINE) injection 10 mg 10 mg EVERY 6 HOURS PRN 4/16/2019     Sig: Inject 2 mLs (10 mg) into the vein every 6 hours as needed for nausea or vomiting    Class: E-Prescribe    Route: Intravenous    Linked Group 3:  \"Or\" Linked Group Details        prochlorperazine (COMPAZINE) tablet 10 mg 10 mg EVERY 6 HOURS PRN 4/16/2019     Sig: Take 1 tablet (10 mg) by mouth every 6 hours as needed for nausea or vomiting    Class: E-Prescribe    Route: Oral    Linked Group 3:  \"Or\" Linked Group Details        protamine injection 50 mg (Completed) 50 mg ONCE 4/16/2019 4/16/2019    Sig: Inject 5 mLs (50 mg) into the vein once    Class: E-Prescribe    Route: Intravenous    ranitidine (ZANTAC) tablet 150 mg 150 mg 2 TIMES DAILY 4/16/2019     Sig: Take 1 tablet (150 mg) by mouth 2 times daily    Class: E-Prescribe    Route: Oral    senna-docusate (SENOKOT-S/PERICOLACE) 8.6-50 MG per tablet 1 tablet 1 tablet 2 TIMES DAILY 4/16/2019     Sig: Take 1 tablet by mouth 2 times daily    Class: E-Prescribe    Route: Oral    Linked Group 4:  \"Or\" Linked Group Details        senna-docusate (SENOKOT-S/PERICOLACE) 8.6-50 MG per tablet 2 tablet 2 tablet 2 TIMES DAILY 4/16/2019     Sig: Take 2 tablets by mouth 2 times daily    Class: E-Prescribe    Route: Oral    Linked Group 4:  \"Or\" Linked Group Details              Labs:  Cr 1.07. Hbg 10.9    Assessment: Post-operative day #1, doing well. C/o of abdominal muscle soreness.     Plan:   -Continue frequent ambulation and IS   -Continue GI and DVT prophylaxis  -Pain control: Scheduled acetaminophen. Toradol given yesterday and this AM, will stop now. Continue dilaudid PO PRN. Use methocarbamol PRN.    -Contact dermatitis, secondary to binder, heat. Hydroxyzine PRN. Patient to wear over binder over " clothing if needed while ambulating. Recommend not to wear binder while in bed or sitting.   -Senna/colace scheduled BID. Recommend Dulcolax supp for today.     Discharge planning: Possible today vs tomorrow.    Courtney Silva PA-C  Division of Transplantation  Pager [phone]

## 2019-04-18 NOTE — PLAN OF CARE
[id number]  VS - A&O, VSS, RA, afebrile  Pain -incisional pain controlled with scheduled tylenol and IV Toradol   Nausea - denies  Diet - regular diet, tolerating well  LDA - L PIV SL, CDI  Resp - Denies SOB  GI - Last BM prior to surgery, reports + gas   - Voiding adequately   Skin - Abd midline incision and 3X lap sites clean and intact, with dermabond. Midline has scant amount of drainage, pt declined dressing. Abd binder in place   Mobility - Up ad conrado, steady gait  Plan - Possibility of discharge today, pending pain control. Will continue to monitor and follow poc

## 2019-04-19 ENCOUNTER — CARE COORDINATION (OUTPATIENT)
Dept: TRANSPLANT | Facility: CLINIC | Age: 44
End: 2019-04-19

## 2019-04-19 VITALS
TEMPERATURE: 97.6 F | SYSTOLIC BLOOD PRESSURE: 109 MMHG | RESPIRATION RATE: 16 BRPM | HEIGHT: 67 IN | WEIGHT: 130.4 LBS | DIASTOLIC BLOOD PRESSURE: 74 MMHG | OXYGEN SATURATION: 97 % | BODY MASS INDEX: 20.47 KG/M2 | HEART RATE: 64 BPM

## 2019-04-19 PROCEDURE — 25000132 ZZH RX MED GY IP 250 OP 250 PS 637: Performed by: PHYSICIAN ASSISTANT

## 2019-04-19 PROCEDURE — 25000132 ZZH RX MED GY IP 250 OP 250 PS 637: Performed by: SURGERY

## 2019-04-19 RX ORDER — PRAMOXINE HYDROCHLORIDE 10 MG/ML
LOTION TOPICAL EVERY 6 HOURS PRN
Status: DISCONTINUED | OUTPATIENT
Start: 2019-04-19 | End: 2019-04-19 | Stop reason: HOSPADM

## 2019-04-19 RX ORDER — HYDROMORPHONE HYDROCHLORIDE 2 MG/1
2 TABLET ORAL
Qty: 10 TABLET | Refills: 0 | Status: SHIPPED | OUTPATIENT
Start: 2019-04-19 | End: 2021-04-05

## 2019-04-19 RX ORDER — BENZOCAINE/MENTHOL 6 MG-10 MG
LOZENGE MUCOUS MEMBRANE 2 TIMES DAILY
Qty: 1.5 G | Refills: 0 | Status: SHIPPED | OUTPATIENT
Start: 2019-04-19 | End: 2021-04-05

## 2019-04-19 RX ORDER — ACETAMINOPHEN 325 MG/1
975 TABLET ORAL EVERY 8 HOURS
Qty: 100 TABLET | Refills: 0 | Status: SHIPPED | OUTPATIENT
Start: 2019-04-19 | End: 2021-04-05

## 2019-04-19 RX ORDER — PRAMOXINE HYDROCHLORIDE 10 MG/ML
LOTION TOPICAL EVERY 6 HOURS PRN
Qty: 1 BOTTLE | Refills: 0 | Status: SHIPPED | OUTPATIENT
Start: 2019-04-19 | End: 2021-04-05

## 2019-04-19 RX ORDER — AMOXICILLIN 250 MG
1 CAPSULE ORAL 2 TIMES DAILY
Qty: 100 TABLET | Refills: 1 | Status: SHIPPED | OUTPATIENT
Start: 2019-04-19 | End: 2021-04-05

## 2019-04-19 RX ORDER — BISACODYL 10 MG
10 SUPPOSITORY, RECTAL RECTAL ONCE
Status: COMPLETED | OUTPATIENT
Start: 2019-04-19 | End: 2019-04-19

## 2019-04-19 RX ORDER — BENZOCAINE/MENTHOL 6 MG-10 MG
LOZENGE MUCOUS MEMBRANE 2 TIMES DAILY
Status: DISCONTINUED | OUTPATIENT
Start: 2019-04-19 | End: 2019-04-19 | Stop reason: HOSPADM

## 2019-04-19 RX ADMIN — BISACODYL 10 MG: 10 SUPPOSITORY RECTAL at 10:55

## 2019-04-19 RX ADMIN — HYDROMORPHONE HYDROCHLORIDE 2 MG: 2 TABLET ORAL at 14:21

## 2019-04-19 RX ADMIN — METHOCARBAMOL 500 MG: 500 TABLET, FILM COATED ORAL at 11:00

## 2019-04-19 RX ADMIN — ACETAMINOPHEN 975 MG: 325 TABLET, FILM COATED ORAL at 07:17

## 2019-04-19 RX ADMIN — HYDROMORPHONE HYDROCHLORIDE 2 MG: 2 TABLET ORAL at 07:17

## 2019-04-19 RX ADMIN — SENNOSIDES AND DOCUSATE SODIUM 2 TABLET: 8.6; 5 TABLET ORAL at 07:18

## 2019-04-19 RX ADMIN — HYDROMORPHONE HYDROCHLORIDE 2 MG: 2 TABLET ORAL at 03:50

## 2019-04-19 RX ADMIN — ACETAMINOPHEN 975 MG: 325 TABLET, FILM COATED ORAL at 00:09

## 2019-04-19 RX ADMIN — RANITIDINE 150 MG: 150 TABLET ORAL at 07:18

## 2019-04-19 RX ADMIN — HYDROCORTISONE: 1 CREAM TOPICAL at 10:54

## 2019-04-19 ASSESSMENT — ACTIVITIES OF DAILY LIVING (ADL)
ADLS_ACUITY_SCORE: 12

## 2019-04-19 ASSESSMENT — MIFFLIN-ST. JEOR: SCORE: 1279.12

## 2019-04-19 ASSESSMENT — PAIN DESCRIPTION - DESCRIPTORS: DESCRIPTORS: ACHING

## 2019-04-19 NOTE — PLAN OF CARE
"/75 (BP Location: Left arm)   Pulse 64   Temp 98.7  F (37.1  C) (Oral)   Resp 16   Ht 1.702 m (5' 7\")   Wt 58.3 kg (128 lb 9.6 oz)   LMP 04/09/2019   SpO2 97%   BMI 20.14 kg/m      Vitals: Afeb., OVSS, on RA.  Neuro:  Pt. alert & Ox4, calls appropriately.    Activity: Pt. up independently.  LDAs: Left PIV saline locked.  Cardiac: WNL  Respiratory: LS clear bilat. No SOB or ROSALES.  GI/: Voiding adequate amounts, no stools this shift.   Skin: Incision & lap. sites approximated with liquid bandage & c/d/i.  Pain: Incisional pain controlled with sched. Tylenol & prn Dilaudid po x1.  Diet: Regular diet & tolerating diet well.  Labs/Imaging: morning labs pending.  Plan: Plan to discharge home today. Continue to follow POC & notify provider with changes.    "

## 2019-04-19 NOTE — PHARMACY-CONSULT NOTE
[facility] Prior to Discharge Note  43 year old female s/p kidney donation surgery on 4/16/19.     Pharmacy has monitored for any potential medication issues.  In anticipation for discharge, medication therapy needs have been addressed daily throughout the current admission via multidisciplinary rounds and/or discussions, order verification, daily clinical pharmacy review, and communication with prescribers.    Symone Mcwilliams, Pharm.D., [facility]  Pager [phone]

## 2019-04-19 NOTE — PLAN OF CARE
"/60 (BP Location: Left arm)   Pulse 64   Temp 98.2  F (36.8  C) (Oral)   Resp 16   Ht 1.702 m (5' 7\")   Wt 58.3 kg (128 lb 9.6 oz)   LMP 04/09/2019   SpO2 97%   BMI 20.14 kg/m      5359-8204: Afebrile. Pt continues to be slightly hypotensive (100s/60s), baseline per pt report. OVSS on RA. Pt complaining of some abdominal discomfort, pain well controlled this shift with patients scheduled dose of Tylenol and PRN Robaxin x1. Pt on regular diet, pt has a fair appetite, no complaints of nausea. PIV L Forearm SL. Pt voiding adequate amounts, saving via hat in the bathroom. No BM this shift, per pt report she had a BM earlier in the day, pt is also passing flatus. Midline abdominal incision and 3 lap sites on R side of abdomen c/d/I, approximated with liquid bandages, no drainage, and open to air. Pt does have rash on her abdomen, presumed to be from abdominal binder, pt educated to wear binder on the outside of her clothes when ambulating and to remove when laying in bed. Pt up independently, pt ambulated the unit a couple times this shift. Plan for patient to discharge home tomorrow. Call light in reach. Will continue to monitor and follow plan of care.   "

## 2019-04-19 NOTE — PROGRESS NOTES
"I saw Elly on 7A.  She said she was ready to go home.   She wanted to take a pillow  her for the ride room.  She agreed to asking her  to bring a pillow.    She seems very hesitant and unsure. I encouraged and reviewed support to her while at home and on the weekend.   I referred to \"Waht you should know after donation\"  I will follow up with her on Monday.   I will remain available.      Patient s discharge needs assessed and discharge planning has been conducted with the multidisciplinary transplant care team including physicians, pharmacy, social work and transplant coordinator.  "

## 2019-04-19 NOTE — PROGRESS NOTES
"Transplant Surgery  Inpatient Daily Progress Note  04/19/2019    Ms. Ballard is Post-operative day #3 s/p laparoscopic right donor nephrectomy. Issues Overnight: None     Patient Vitals for the past 24 hrs:   BP Temp Temp src Pulse Heart Rate Resp SpO2 Weight   04/19/19 0733 (!) 111/91 97.6  F (36.4  C) Oral -- 61 16 97 % --   04/19/19 0419 109/75 98.7  F (37.1  C) Oral -- 95 16 97 % --   04/18/19 2331 110/67 97.8  F (36.6  C) Oral -- 69 16 97 % --   04/18/19 1936 105/60 98.2  F (36.8  C) Oral -- 60 16 97 % --   04/18/19 1640 100/61 97.5  F (36.4  C) Oral -- 63 16 99 % --   04/18/19 1445 -- -- -- -- -- -- -- 58.3 kg (128 lb 9.6 oz)   04/18/19 1136 103/65 98.6  F (37  C) Oral 64 -- 16 98 % --       Pain: Visual Analog Score: NA  Nausea:    [x]    None      []    Some, but not needing medication    []    Yes, needing medication      []    Dry Retching    []    Vomited    DREAMS Performance:    Drinking: Yes   Eating: Yes   Analgesia: Yes   Mobilizing:  Yes   Slept: Yes    Passed flatus? Yes  Skin: Erythematous rash abdomen, demarcated previous abdominal binder outline  Incision(s): C/D/I with no yvonne-incisional ecchymoses  Abdomen: mild distention, appropriately tender, soft  Extremities: no cyanosis or edema     Allergies:   Allergies   Allergen Reactions     Seasonal Allergies        Medications:  Hospital Medications as of 4/17/2019       Dose Frequency Start End    acetaminophen (TYLENOL) tablet 975 mg 975 mg EVERY 8 HOURS 4/16/2019 4/19/2019    Sig: Take 3 tablets (975 mg) by mouth every 8 hours    Class: E-Prescribe    Route: Oral    bupivacaine liposome (EXPAREL) LONG ACTING injection was administered into the infiltration site to produce postsurgical analgesia. Duration of action is up to 72 hours, and other \"esau\" medications should not be given for 96 hours with the exception of the lidocaine 5% patch (LIDODERM) and the lidocaine 10mg in potassium infusions. This entry is for INFORMATION ONLY.  CONTINUOUS " "PRN 4/16/2019 4/20/2019    Sig: continuous prn    Class: E-Prescribe    Route: Does not apply    fentaNYL (PF) (SUBLIMAZE) injection 10-20 mcg 10-20 mcg EVERY 1 HOUR PRN 4/16/2019     Sig: Inject 0.2-0.4 mLs (10-20 mcg) into the vein every hour as needed for other (For optimal opioid multimodal pain management to improve pain control and physical function.)    Route: Intravenous    HYDROmorphone (DILAUDID) tablet 2 mg 2 mg EVERY 3 HOURS PRN 4/17/2019     Sig: Take 1 tablet (2 mg) by mouth every 3 hours as needed for moderate to severe pain    Route: Oral    methocarbamol (ROBAXIN) tablet 500 mg 500 mg 4 TIMES DAILY PRN 4/17/2019     Sig: Take 1 tablet (500 mg) by mouth 4 times daily as needed for muscle spasms    Class: E-Prescribe    Route: Oral    metoclopramide (REGLAN) injection 10 mg 10 mg EVERY 6 HOURS PRN 4/16/2019     Sig: Inject 2 mLs (10 mg) into the vein every 6 hours as needed for nausea or vomiting    Class: E-Prescribe    Route: Intravenous    Linked Group 1:  \"Or\" Linked Group Details        metoclopramide (REGLAN) tablet 10 mg 10 mg EVERY 6 HOURS PRN 4/16/2019     Sig: Take 1 tablet (10 mg) by mouth every 6 hours as needed for nausea or vomiting    Class: E-Prescribe    Route: Oral    Linked Group 1:  \"Or\" Linked Group Details        naloxone (NARCAN) injection 0.1-0.4 mg 0.1-0.4 mg EVERY 2 MIN PRN 4/16/2019 4/17/2019    Sig: Inject 0.25-1 mLs (0.1-0.4 mg) into the vein every 2 minutes as needed for opioid reversal    Class: E-Prescribe    Route: Intravenous    naloxone (NARCAN) injection 0.1-0.4 mg 0.1-0.4 mg EVERY 2 MIN PRN 4/17/2019     Sig: Inject 0.25-1 mLs (0.1-0.4 mg) into the vein every 2 minutes as needed for opioid reversal    Class: E-Prescribe    Route: Intravenous    ondansetron (ZOFRAN) injection 4 mg 4 mg EVERY 6 HOURS PRN 4/16/2019     Sig: Inject 2 mLs (4 mg) into the vein every 6 hours as needed for nausea or vomiting    Class: E-Prescribe    Route: Intravenous    Linked Group 2:  " "\"Or\" Linked Group Details        ondansetron (ZOFRAN-ODT) ODT tab 4 mg 4 mg EVERY 6 HOURS PRN 4/16/2019     Sig: Take 1 tablet (4 mg) by mouth every 6 hours as needed for nausea or vomiting    Class: E-Prescribe    Route: Oral    Linked Group 2:  \"Or\" Linked Group Details        prochlorperazine (COMPAZINE) injection 10 mg 10 mg EVERY 6 HOURS PRN 4/16/2019     Sig: Inject 2 mLs (10 mg) into the vein every 6 hours as needed for nausea or vomiting    Class: E-Prescribe    Route: Intravenous    Linked Group 3:  \"Or\" Linked Group Details        prochlorperazine (COMPAZINE) tablet 10 mg 10 mg EVERY 6 HOURS PRN 4/16/2019     Sig: Take 1 tablet (10 mg) by mouth every 6 hours as needed for nausea or vomiting    Class: E-Prescribe    Route: Oral    Linked Group 3:  \"Or\" Linked Group Details        protamine injection 50 mg (Completed) 50 mg ONCE 4/16/2019 4/16/2019    Sig: Inject 5 mLs (50 mg) into the vein once    Class: E-Prescribe    Route: Intravenous    ranitidine (ZANTAC) tablet 150 mg 150 mg 2 TIMES DAILY 4/16/2019     Sig: Take 1 tablet (150 mg) by mouth 2 times daily    Class: E-Prescribe    Route: Oral    senna-docusate (SENOKOT-S/PERICOLACE) 8.6-50 MG per tablet 1 tablet 1 tablet 2 TIMES DAILY 4/16/2019     Sig: Take 1 tablet by mouth 2 times daily    Class: E-Prescribe    Route: Oral    Linked Group 4:  \"Or\" Linked Group Details        senna-docusate (SENOKOT-S/PERICOLACE) 8.6-50 MG per tablet 2 tablet 2 tablet 2 TIMES DAILY 4/16/2019     Sig: Take 2 tablets by mouth 2 times daily    Class: E-Prescribe    Route: Oral    Linked Group 4:  \"Or\" Linked Group Details              Labs:  Cr 1.07. Hbg 10.9    Assessment: Post-operative day #1, doing well. C/o of abdominal muscle soreness.     Assessment: Post-operative day #1, doing well. C/o of abdominal muscle soreness.      Plan:   -Continue frequent ambulation and IS   -Continue GI and DVT prophylaxis  -Pain control: Scheduled acetaminophen and as needed dilaudid " PO.    -Contact dermatitis, secondary to binder, heat. Patient not wearing binder anymore. Will order hydrocortisone cream for inflammation and pramoxine lotion for itching.   -Senna/colace scheduled BID. Recommend Dulcolax supp and MOM for today. Water enema if wanted     Discharge planning: Possible discharge today.        Courtney Silva PA-C  Division of Transplantation  Pager [phone]

## 2019-04-19 NOTE — PLAN OF CARE
"/74 (BP Location: Right arm)   Pulse 64   Temp 97.6  F (36.4  C) (Oral)   Resp 16   Ht 1.702 m (5' 7\")   Wt 59.1 kg (130 lb 6.4 oz)   LMP 04/09/2019   SpO2 97%   BMI 20.42 kg/m       VS: stable on room air.   BG: n/a.   LABS: see chart.   Pain: dilaudid x 2 and robaxin x 1.  Nausea: denied.   Diet: regular diet.   LDA: PIV saline locked and removed for discharge.   GI/: voiding and had a BM after a suppository.   Skin: incision open to air and CDI.   Mobility: up independently in room.   Education: reviewed discharge instructions.  Plan: Pt discharged and  took pt with belongings to discharge pharmacy then home.     All care explained and questions answered. Will continue to monitor and notify team of changes.     "

## 2019-04-20 LAB — TRYPTASE SERPL-MCNC: 2.9 UG/L

## 2019-05-06 ENCOUNTER — OFFICE VISIT (OUTPATIENT)
Dept: TRANSPLANT | Facility: CLINIC | Age: 44
End: 2019-05-06
Attending: TRANSPLANT SURGERY

## 2019-05-06 VITALS
HEART RATE: 75 BPM | BODY MASS INDEX: 18.78 KG/M2 | SYSTOLIC BLOOD PRESSURE: 103 MMHG | DIASTOLIC BLOOD PRESSURE: 70 MMHG | OXYGEN SATURATION: 98 % | WEIGHT: 119.9 LBS

## 2019-05-06 DIAGNOSIS — Z09 POSTOP CHECK: Primary | ICD-10-CM

## 2019-05-06 PROCEDURE — G0463 HOSPITAL OUTPT CLINIC VISIT: HCPCS | Mod: ZF

## 2019-05-06 ASSESSMENT — PAIN SCALES - GENERAL: PAINLEVEL: MILD PAIN (2)

## 2019-05-06 NOTE — PROGRESS NOTES
[facility]  Transplant Surgery Progress Note    Postop Visit S/P Laparoscopic Donor Nephrectomy    Date of Visit: 05/06/2019    Date of Surgery:  4/16/2019 (Kidney); Postoperative day:    History:  Ms. Ballard now comes for scheduled postop visit following laparosocpic donor nephrectomy.  She had an uneventful hospital course.  She has the following complaints:  None      :   No    Yes  Difficulty controlling pain:  [x]      []                 Comment:     Abdominal Pain:                  [x]      []                 Comment:     Constipation:   [x]      []                 Comment:    Shoulder Pain:   [x]      []                 Comment:   Trouble voiding:   [x]      []                 Comment:      Hematuria:   [x]      []                 Comment:                  Trouble Eating:   [x]      []                 Comment:           Other:    [x]      []                Comment:           ROS:   CONSTITUTIONAL:  No fevers or chills  EYES: negative for icterus  ENT:  negative for hearing loss, tinnitus and sore throat  RESPIRATORY:  negative for cough, sputum, dyspnea  CARDIOVASCULAR:  negative for chest pain  GASTROINTESTINAL:  negative for nausea, vomiting, diarrhea or constipation  GENITOURINARY:  negative for incontinence, dysuria, bladder emptying problems  HEME:  No easy bruising  INTEGUMENT:  negative for rash and pruritus  NEURO:  Negative for headache, seizure disorder    Medications:    Prescription Medications as of 5/6/2019       Rx Number Disp Refills Start End Last Dispensed Date Next Fill Date Owning Pharmacy    acetaminophen (TYLENOL) 325 MG tablet  100 tablet 0 4/19/2019    [address]    Sig: Take 3 tablets (975 mg) by mouth every 8 hours    Class: E-Prescribe    Route: Oral    senna-docusate (SENOKOT-S/PERICOLACE) 8.6-50 MG tablet  100 tablet 1 4/19/2019    [place] [id number]  [facility]    Sig: Take 1 tablet by mouth 2 times daily    Class: E-Prescribe    Route: Oral    hydrocortisone (CORTAID) 1 % external cream  1.5 g 0 4/19/2019    [address]    Sig: Apply topically 2 times daily    Class: E-Prescribe    Notes to Pharmacy: Re label hospital medication    Route: Topical    HYDROmorphone (DILAUDID) 2 MG tablet  10 tablet 0 4/19/2019    [address]    Sig: Take 1 tablet (2 mg) by mouth every 3 hours as needed for moderate to severe pain    Class: Local Print    Earliest Fill Date: 4/19/2019    Route: Oral    magnesium hydroxide (MILK OF MAGNESIA) 400 MG/5ML suspension  118 mL 1 4/19/2019    [address]    Sig: Take 30 mLs by mouth daily as needed for constipation    Class: E-Prescribe    Route: Oral    pramoxine (PRAX) 1 % LOTN lotion  1 Bottle 0 4/19/2019    [address]    Sig: Apply topically every 6 hours as needed for itching (Apply to rash, not over incision. Try 2nd after hydrocortisone)    Class: E-Prescribe    Notes to Pharmacy: Re label hospital medication    Route: Topical          Exam:   /70   Pulse 75   Wt 54.4 kg (119 lb 14.4 oz)   LMP 04/09/2019   SpO2 98%   BMI 18.78 kg/m    Well appearing, No apparent distress  Abdomen soft, non-distended, appropriate level of tenderness  Incisions clean, dry and intact    Chemistries:   Recent Labs   Lab Test 04/17/19  0625  01/04/19  0749   BUN 8  --  8   CR 1.07*   < > 0.62   GFRESTIMATED 63   < > >90   GLC  --   --  86    < > = values in this interval not displayed.     Lab Results   Component Value Date    A1C 5.2 01/04/2019     Recent Labs   Lab Test 01/04/19  0749   ALBUMIN 4.2   BILITOTAL 0.5   ALKPHOS 44   AST 14   ALT 20     Urine Studies:  Recent Labs   Lab Test 04/08/19  1113  01/16/19  0929   COLOR Straw Straw   APPEARANCE Clear Clear   URINEGLC Negative Negative   URINEBILI Negative Negative   URINEKETONE Negative Negative   SG 1.003 1.004   UBLD Negative Negative   URINEPH 7.0 7.0   PROTEIN Negative Negative   NITRITE Negative Negative   LEUKEST Negative Negative   RBCU  --  <1   WBCU  --  0     Recent Labs   Lab Test 01/04/19  0759 11/20/18  0859   UTPG 0.05 Unable to calculate due to low value     Hematology:   Recent Labs   Lab Test 04/17/19  0625 04/16/19  1420 04/16/19  0613  01/04/19  0749  04/19/18  1040 12/11/16  1246   HGB 10.9* 11.2* 12.6   < > 13.4   < > 13.1 12.8   PLT  --   --   --   --  223  --  206 199   WBC  --   --   --   --  5.5  --  9.3 9.8    < > = values in this interval not displayed.     Coags:   Recent Labs   Lab Test 01/04/19  0749   INR 1.10       Assessment: Ms. Ballard recovering well following lap donor nephrectomy.      Plan:    1. Routine lab and urine check at six weeks postop  2. Follow up on an as needed basis  3. Routine preventative health maintenence    Discussion:  Patient counseled on the need for continued health maintenance and regular appointments with primary care physician.  I emphasized the need to treat any developing hypertension, diabetes, or renal problems such as stones, infection, or trauma.  We discussed minimization or avoidance of NSAID use.     Total Time: 20 min,   Counselling Time: 10 min.    .

## 2019-05-06 NOTE — LETTER
5/6/2019      RE: Elly Ballard  [address] [address]       Chief Complaint   Patient presents with     Transplant Donor Post Donation     3 week kidney donor follow up     Blood pressure 103/70, pulse 75, weight 54.4 kg (119 lb 14.4 oz), last menstrual period 04/09/2019, SpO2 98 %.    Giovana Santiago CMA      [facility]  Transplant Surgery Progress Note    Postop Visit S/P Laparoscopic Donor Nephrectomy    Date of Visit: 05/06/2019    Date of Surgery:  4/16/2019 (Kidney); Postoperative day:    History:  Ms. Ballard now comes for scheduled postop visit following laparosocpic donor nephrectomy.  She had an uneventful hospital course.  She has the following complaints:  None      :   No    Yes  Difficulty controlling pain:  [x]      []                 Comment:     Abdominal Pain:                  [x]      []                 Comment:     Constipation:   [x]      []                 Comment:    Shoulder Pain:   [x]      []                 Comment:   Trouble voiding:   [x]      []                 Comment:      Hematuria:   [x]      []                 Comment:                  Trouble Eating:   [x]      []                 Comment:           Other:    [x]      []                Comment:           ROS:   CONSTITUTIONAL:  No fevers or chills  EYES: negative for icterus  ENT:  negative for hearing loss, tinnitus and sore throat  RESPIRATORY:  negative for cough, sputum, dyspnea  CARDIOVASCULAR:  negative for chest pain  GASTROINTESTINAL:  negative for nausea, vomiting, diarrhea or constipation  GENITOURINARY:  negative for incontinence, dysuria, bladder emptying problems  HEME:  No easy bruising  INTEGUMENT:  negative for rash and pruritus  NEURO:  Negative for headache, seizure disorder    Medications:    Prescription Medications as of 5/6/2019       Rx Number Disp Refills Start End Last Dispensed Date Next Fill Date Owning Pharmacy    acetaminophen (TYLENOL) 325 MG tablet  100  tablet 0 4/19/2019    [address]    Sig: Take 3 tablets (975 mg) by mouth every 8 hours    Class: E-Prescribe    Route: Oral    senna-docusate (SENOKOT-S/PERICOLACE) 8.6-50 MG tablet  100 tablet 1 4/19/2019    [address]    Sig: Take 1 tablet by mouth 2 times daily    Class: E-Prescribe    Route: Oral    hydrocortisone (CORTAID) 1 % external cream  1.5 g 0 4/19/2019    [address]    Sig: Apply topically 2 times daily    Class: E-Prescribe    Notes to Pharmacy: Re label hospital medication    Route: Topical    HYDROmorphone (DILAUDID) 2 MG tablet  10 tablet 0 4/19/2019    [address]    Sig: Take 1 tablet (2 mg) by mouth every 3 hours as needed for moderate to severe pain    Class: Local Print    Earliest Fill Date: 4/19/2019    Route: Oral    magnesium hydroxide (MILK OF MAGNESIA) 400 MG/5ML suspension  118 mL 1 4/19/2019    [address]    Sig: Take 30 mLs by mouth daily as needed for constipation    Class: E-Prescribe    Route: Oral    pramoxine (PRAX) 1 % LOTN lotion  1 Bottle 0 4/19/2019    [address]    Sig: Apply topically every 6 hours as needed for itching (Apply to rash, not over incision. Try 2nd after hydrocortisone)    Class: E-Prescribe    Notes to Pharmacy: Re label hospital medication    Route: Topical          Exam:   /70   Pulse 75   Wt 54.4 kg (119 lb 14.4 oz)   LMP 04/09/2019   SpO2 98%   BMI 18.78 kg/m     Well appearing, No apparent distress  Abdomen soft, non-distended, appropriate level of tenderness  Incisions clean, dry and intact    Chemistries:   Recent Labs   Lab Test 04/17/19  0625  01/04/19  0749   BUN 8  --  8   CR 1.07*   <  > 0.62   GFRESTIMATED 63   < > >90   GLC  --   --  86    < > = values in this interval not displayed.     Lab Results   Component Value Date    A1C 5.2 01/04/2019     Recent Labs   Lab Test 01/04/19  0749   ALBUMIN 4.2   BILITOTAL 0.5   ALKPHOS 44   AST 14   ALT 20     Urine Studies:  Recent Labs   Lab Test 04/08/19  1113 01/16/19  0929   COLOR Straw Straw   APPEARANCE Clear Clear   URINEGLC Negative Negative   URINEBILI Negative Negative   URINEKETONE Negative Negative   SG 1.003 1.004   UBLD Negative Negative   URINEPH 7.0 7.0   PROTEIN Negative Negative   NITRITE Negative Negative   LEUKEST Negative Negative   RBCU  --  <1   WBCU  --  0     Recent Labs   Lab Test 01/04/19  0759 11/20/18  0859   UTPG 0.05 Unable to calculate due to low value     Hematology:   Recent Labs   Lab Test 04/17/19  0625 04/16/19  1420 04/16/19  0613  01/04/19  0749  04/19/18  1040 12/11/16  1246   HGB 10.9* 11.2* 12.6   < > 13.4   < > 13.1 12.8   PLT  --   --   --   --  223  --  206 199   WBC  --   --   --   --  5.5  --  9.3 9.8    < > = values in this interval not displayed.     Coags:   Recent Labs   Lab Test 01/04/19  0749   INR 1.10       Assessment: Ms. Ballard recovering well following lap donor nephrectomy.      Plan:    1. Routine lab and urine check at six weeks postop  2. Follow up on an as needed basis  3. Routine preventative health maintenence    Discussion:  Patient counseled on the need for continued health maintenance and regular appointments with primary care physician.  I emphasized the need to treat any developing hypertension, diabetes, or renal problems such as stones, infection, or trauma.  We discussed minimization or avoidance of NSAID use.     Total Time: 20 min,   Counselling Time: 10 min.    .

## 2019-05-06 NOTE — PROGRESS NOTES
Chief Complaint   Patient presents with     Transplant Donor Post Donation     3 week kidney donor follow up     Blood pressure 103/70, pulse 75, weight 54.4 kg (119 lb 14.4 oz), last menstrual period 04/09/2019, SpO2 98 %.    Giovana Santiago, CMA

## 2019-05-29 ENCOUNTER — APPOINTMENT (OUTPATIENT)
Dept: LAB | Facility: CLINIC | Age: 44
End: 2019-05-29
Payer: COMMERCIAL

## 2019-05-29 ENCOUNTER — HOSPITAL ENCOUNTER (OUTPATIENT)
Facility: CLINIC | Age: 44
Setting detail: SPECIMEN
Discharge: HOME | End: 2019-05-29
Admitting: TRANSPLANT SURGERY

## 2019-05-29 DIAGNOSIS — Z52.4 KIDNEY DONOR: Primary | ICD-10-CM

## 2019-05-29 LAB
ALBUMIN UR-MCNC: NEGATIVE MG/DL
APPEARANCE UR: CLEAR
BILIRUB UR QL STRIP: NEGATIVE
COLOR UR AUTO: ABNORMAL
CREAT SERPL-MCNC: 0.87 MG/DL (ref 0.52–1.04)
CREAT UR-MCNC: <3 MG/DL
GFR SERPL CREATININE-BSD FRML MDRD: 81 ML/MIN/{1.73_M2}
GLUCOSE UR STRIP-MCNC: NEGATIVE MG/DL
HGB UR QL STRIP: ABNORMAL
KETONES UR STRIP-MCNC: NEGATIVE MG/DL
LEUKOCYTE ESTERASE UR QL STRIP: NEGATIVE
MICROALBUMIN UR-MCNC: <5 MG/L
MICROALBUMIN/CREAT UR: NORMAL MG/G CR (ref 0–25)
NITRATE UR QL: NEGATIVE
PH UR STRIP: 6 PH (ref 5–7)
PROT UR-MCNC: <0.05 G/L
PROT/CREAT 24H UR: NORMAL G/G CR (ref 0–0.2)
RBC #/AREA URNS AUTO: 1 /HPF (ref 0–2)
SOURCE: ABNORMAL
SP GR UR STRIP: 1.01 (ref 1–1.03)
UROBILINOGEN UR STRIP-MCNC: 0 MG/DL (ref 0–2)
WBC #/AREA URNS AUTO: <1 /HPF (ref 0–5)

## 2019-05-29 PROCEDURE — 36415 COLL VENOUS BLD VENIPUNCTURE: CPT | Performed by: TRANSPLANT SURGERY

## 2019-05-29 PROCEDURE — 82565 ASSAY OF CREATININE: CPT | Performed by: TRANSPLANT SURGERY

## 2019-05-29 PROCEDURE — 84156 ASSAY OF PROTEIN URINE: CPT | Performed by: TRANSPLANT SURGERY

## 2019-05-29 PROCEDURE — 82043 UR ALBUMIN QUANTITATIVE: CPT | Performed by: TRANSPLANT SURGERY

## 2019-05-29 PROCEDURE — 81001 URINALYSIS AUTO W/SCOPE: CPT | Performed by: TRANSPLANT SURGERY

## 2019-08-07 ENCOUNTER — TELEPHONE (OUTPATIENT)
Dept: SCHEDULING | Facility: CLINIC | Age: 44
End: 2019-08-07

## 2019-09-06 ENCOUNTER — FCC EXTENDED DOCUMENTATION (OUTPATIENT)
Dept: PSYCHOLOGY | Facility: CLINIC | Age: 44
End: 2019-09-06

## 2019-09-06 NOTE — PROGRESS NOTES
"                    Discharge Summary  Multiple Sessions    Client Name: Elly Ballard MRN#: [id number] YOB: 1975      Intake / Discharge Date: 9/6/2019      DSM5 Diagnoses: (Sustained by DSM5 Criteria Listed Above)  Diagnoses: UPDATE Unspecified Depression, 300.02 (F41.1) Generalized Anxiety Disorder, Unspecified Trauma/Stress; RULE OUT Posttraumatic Stress Disorder  Psychosocial & Contextual Factors: Limited social support, care giving for daughter with MI issues, both daughters are on the spectrum, some family stress  WHODAS: 21          Presenting Concern:  Client reports the reason for seeking therapy at this time as \"anxiety attacks, trouble focusing/concentrating\". Reports waking up and feeling shaky and crying, racing heart, difficulty breathing, feeling scared, powerless, \"not being able to act on rational brain\". Also reports having premenstrual dysphoric disorder symptoms - \"super on edge\", irritable, trouble concentrating, tearfulness, difficulty letting go of negative interactions 2 weeks prior to getting period. Reports vicarious trauma from 15 yo's life experiences as well (daughter has complex trauma from experiences of bullying, sensory processing issues - difficulties with gait and feeling comfortable in clothes - and food intolerance).       Reason for Discharge:  Client did not return.      Disposition at Time of Last Encounter:   Comments:   Transplant surgery, some progress with daughters     Risk Management:   Client denies a history of suicidal ideation, suicide attempts, self-injurious behavior, homicidal ideation, homicidal behavior and and other safety concerns  Recommended that patient call 911 or go to the local ED should there be a change in any of these risk factors.      Referred To:  PCP        TRACY Fritz   9/6/2019  "

## 2019-09-13 ENCOUNTER — HOSPITAL ENCOUNTER (OUTPATIENT)
Facility: CLINIC | Age: 44
Setting detail: SPECIMEN
Discharge: HOME | End: 2019-09-13
Admitting: TRANSPLANT SURGERY

## 2019-09-13 DIAGNOSIS — Z52.4 KIDNEY DONOR: Primary | ICD-10-CM

## 2019-09-13 DIAGNOSIS — Z52.4 KIDNEY DONOR: ICD-10-CM

## 2019-09-13 LAB
ALBUMIN UR-MCNC: NEGATIVE MG/DL
APPEARANCE UR: CLEAR
BILIRUB UR QL STRIP: NEGATIVE
COLOR UR AUTO: NORMAL
CREAT SERPL-MCNC: 0.91 MG/DL (ref 0.52–1.04)
CREAT UR-MCNC: 17 MG/DL
CREAT UR-MCNC: 18 MG/DL
GFR SERPL CREATININE-BSD FRML MDRD: 77 ML/MIN/{1.73_M2}
GLUCOSE UR STRIP-MCNC: NEGATIVE MG/DL
HGB UR QL STRIP: NEGATIVE
KETONES UR STRIP-MCNC: NEGATIVE MG/DL
LEUKOCYTE ESTERASE UR QL STRIP: NEGATIVE
MICROALBUMIN UR-MCNC: <5 MG/L
MICROALBUMIN/CREAT UR: NORMAL MG/G CR (ref 0–25)
NITRATE UR QL: NEGATIVE
PH UR STRIP: 6 PH (ref 5–7)
PROT UR-MCNC: <0.05 G/L
PROT/CREAT 24H UR: NORMAL G/G CR (ref 0–0.2)
RBC #/AREA URNS AUTO: 1 /HPF (ref 0–2)
SOURCE: NORMAL
SP GR UR STRIP: 1 (ref 1–1.03)
UROBILINOGEN UR STRIP-MCNC: 0 MG/DL (ref 0–2)
WBC #/AREA URNS AUTO: 0 /HPF (ref 0–5)

## 2019-09-13 PROCEDURE — 82043 UR ALBUMIN QUANTITATIVE: CPT | Performed by: TRANSPLANT SURGERY

## 2019-11-05 ENCOUNTER — HEALTH MAINTENANCE LETTER (OUTPATIENT)
Age: 44
End: 2019-11-05

## 2019-11-13 ENCOUNTER — NURSE TRIAGE (OUTPATIENT)
Dept: NURSING | Facility: CLINIC | Age: 44
End: 2019-11-13

## 2019-11-14 ENCOUNTER — OFFICE VISIT (OUTPATIENT)
Dept: FAMILY MEDICINE | Facility: CLINIC | Age: 44
End: 2019-11-14
Payer: COMMERCIAL

## 2019-11-14 VITALS
HEART RATE: 64 BPM | WEIGHT: 126 LBS | DIASTOLIC BLOOD PRESSURE: 76 MMHG | SYSTOLIC BLOOD PRESSURE: 116 MMHG | TEMPERATURE: 97.8 F | BODY MASS INDEX: 19.73 KG/M2

## 2019-11-14 DIAGNOSIS — R10.11 ABDOMINAL PAIN, RIGHT UPPER QUADRANT: Primary | ICD-10-CM

## 2019-11-14 LAB
ALBUMIN UR-MCNC: NEGATIVE MG/DL
APPEARANCE UR: CLEAR
BILIRUB UR QL STRIP: NEGATIVE
COLOR UR AUTO: YELLOW
ERYTHROCYTE [DISTWIDTH] IN BLOOD BY AUTOMATED COUNT: 12.8 % (ref 10–15)
GLUCOSE UR STRIP-MCNC: NEGATIVE MG/DL
HCT VFR BLD AUTO: 38.5 % (ref 35–47)
HGB BLD-MCNC: 12.8 G/DL (ref 11.7–15.7)
HGB UR QL STRIP: ABNORMAL
KETONES UR STRIP-MCNC: NEGATIVE MG/DL
LEUKOCYTE ESTERASE UR QL STRIP: NEGATIVE
LIPASE SERPL-CCNC: 146 U/L (ref 73–393)
MCH RBC QN AUTO: 29.2 PG (ref 26.5–33)
MCHC RBC AUTO-ENTMCNC: 33.2 G/DL (ref 31.5–36.5)
MCV RBC AUTO: 88 FL (ref 78–100)
NITRATE UR QL: NEGATIVE
NON-SQ EPI CELLS #/AREA URNS LPF: NORMAL /LPF
PH UR STRIP: 7 PH (ref 5–7)
PLATELET # BLD AUTO: 231 10E9/L (ref 150–450)
RBC # BLD AUTO: 4.38 10E12/L (ref 3.8–5.2)
RBC #/AREA URNS AUTO: NORMAL /HPF
SOURCE: ABNORMAL
SP GR UR STRIP: 1.01 (ref 1–1.03)
UROBILINOGEN UR STRIP-ACNC: 0.2 EU/DL (ref 0.2–1)
WBC # BLD AUTO: 7.4 10E9/L (ref 4–11)
WBC #/AREA URNS AUTO: NORMAL /HPF

## 2019-11-14 PROCEDURE — 81001 URINALYSIS AUTO W/SCOPE: CPT | Performed by: FAMILY MEDICINE

## 2019-11-14 PROCEDURE — 90686 IIV4 VACC NO PRSV 0.5 ML IM: CPT | Performed by: FAMILY MEDICINE

## 2019-11-14 PROCEDURE — 36415 COLL VENOUS BLD VENIPUNCTURE: CPT | Performed by: FAMILY MEDICINE

## 2019-11-14 PROCEDURE — 85027 COMPLETE CBC AUTOMATED: CPT | Performed by: FAMILY MEDICINE

## 2019-11-14 PROCEDURE — 99213 OFFICE O/P EST LOW 20 MIN: CPT | Mod: 25 | Performed by: FAMILY MEDICINE

## 2019-11-14 PROCEDURE — 80053 COMPREHEN METABOLIC PANEL: CPT | Performed by: FAMILY MEDICINE

## 2019-11-14 PROCEDURE — 90471 IMMUNIZATION ADMIN: CPT | Performed by: FAMILY MEDICINE

## 2019-11-14 PROCEDURE — 83690 ASSAY OF LIPASE: CPT | Performed by: FAMILY MEDICINE

## 2019-11-14 ASSESSMENT — PATIENT HEALTH QUESTIONNAIRE - PHQ9
SUM OF ALL RESPONSES TO PHQ QUESTIONS 1-9: 6
5. POOR APPETITE OR OVEREATING: SEVERAL DAYS

## 2019-11-14 ASSESSMENT — ANXIETY QUESTIONNAIRES
6. BECOMING EASILY ANNOYED OR IRRITABLE: NOT AT ALL
7. FEELING AFRAID AS IF SOMETHING AWFUL MIGHT HAPPEN: SEVERAL DAYS
1. FEELING NERVOUS, ANXIOUS, OR ON EDGE: SEVERAL DAYS
3. WORRYING TOO MUCH ABOUT DIFFERENT THINGS: SEVERAL DAYS
IF YOU CHECKED OFF ANY PROBLEMS ON THIS QUESTIONNAIRE, HOW DIFFICULT HAVE THESE PROBLEMS MADE IT FOR YOU TO DO YOUR WORK, TAKE CARE OF THINGS AT HOME, OR GET ALONG WITH OTHER PEOPLE: SOMEWHAT DIFFICULT
GAD7 TOTAL SCORE: 7
2. NOT BEING ABLE TO STOP OR CONTROL WORRYING: MORE THAN HALF THE DAYS
5. BEING SO RESTLESS THAT IT IS HARD TO SIT STILL: SEVERAL DAYS

## 2019-11-14 ASSESSMENT — PAIN SCALES - GENERAL: PAINLEVEL: MILD PAIN (3)

## 2019-11-14 NOTE — TELEPHONE ENCOUNTER
"Caller has had intermittant upper right abdominal pain recurring over past   2 weeks; today more severe episodes up to 8/10   Caller has IBS and had attributed symptoms to that but now wants to be seen   Triage protocol reviewed   Is afebrile and pain manageable at present   Caller advised to be seen within 24  At PCP or UC; go to ED For  worsening symptoms including fever , vomiting   Caller understands and  Will comply   Zaina RICKETTS      Reason for Disposition    [1] MODERATE pain (e.g., interferes with normal activities) AND [2] comes and goes (cramps) AND [3] present > 24 hours  (Exception: pain with Vomiting or Diarrhea - see that Guideline)    Additional Information    Negative: Shock suspected (e.g., cold/pale/clammy skin, too weak to stand, low BP, rapid pulse)    Negative: Difficult to awaken or acting confused (e.g., disoriented, slurred speech)    Negative: Passed out (i.e., lost consciousness, collapsed and was not responding)    Negative: Sounds like a life-threatening emergency to the triager    Pain is mainly in upper abdomen  (if needed ask: \"is it mainly above the belly button?\")    Negative: Severe difficulty breathing (e.g., struggling for each breath, speaks in single words)    Negative: Shock suspected (e.g., cold/pale/clammy skin, too weak to stand, low BP, rapid pulse)    Negative: Difficult to awaken or acting confused (e.g., disoriented, slurred speech)    Negative: Passed out (i.e., lost consciousness, collapsed and was not responding)    Negative: Visible sweat on face or sweat dripping down face    Negative: Sounds like a life-threatening emergency to the triager    Negative: Followed an abdomen (stomach) injury    Negative: Chest pain    Negative: [1] SEVERE pain (e.g., excruciating) AND [2] present > 1 hour    Negative: [1] Pain lasts > 10 minutes AND [2] age > 50    Negative: [1] Pain lasts > 10 minutes AND [2] age > 40 AND [3] associated chest, arm, neck, upper back or jaw " "pain    Negative: [1] Pain lasts > 10 minutes AND [2] age > 35 AND [3] at least one cardiac risk factor (i.e., hypertension, diabetes, obesity, smoker or strong family history of heart disease)    Negative: [1] Pain lasts > 10 minutes AND [2] history of heart disease (i.e., heart attack, bypass surgery, angina, angioplasty, CHF; not just a heart murmur)    Negative: [1] Pain lasts > 10 minutes AND [2] difficulty breathing    Negative: [1] Vomiting AND [2] contains red blood  (Exception: few streaks and only occurred once)    Negative: [1] Vomiting AND [2] contains black (\"coffee ground\") material    Negative: Blood in bowel movements  (Exception: Blood on surface of BM with constipation)    Negative: Black or tarry bowel movements  (Exception: chronic-unchanged  black-grey bowel movements AND is taking iron pills or Pepto-bismol)    Negative: [1] Pregnant > 24 weeks AND [2] hand or face swelling    Negative: Patient sounds very sick or weak to the triager    Negative: [1] MILD-MODERATE pain AND [2] constant AND [3] present > 2 hours    Negative: [1] MILD-MODERATE pain AND [2] not relieved by antacids    Negative: [1] Vomiting AND [2] contains bile (green color)    Negative: [1] Vomiting AND [2] abdomen looks much more swollen than usual    Negative: White of the eyes have turned yellow (i.e., jaundice)    Negative: Fever > 103 F (39.4 C)    Negative: [1] Fever > 101 F (38.3 C) AND [2] age > 60    Negative: [1] Fever > 100.0 F (37.8 C) AND [2] bedridden (e.g., nursing home patient, CVA, chronic illness, recovering from surgery)    Negative: [1] Fever > 100.0 F (37.8 C) AND [2] diabetes mellitus or weak immune system (e.g., HIV positive, cancer chemo, splenectomy, chronic steroids)    Protocols used: ABDOMINAL PAIN - UPPER-A-AH, ABDOMINAL PAIN - FEMALE-A-AH      "

## 2019-11-14 NOTE — PATIENT INSTRUCTIONS
Labs today.    Follow up for ultrasound. I will contact you with results.    [facility]:    Scheduling Appointments  [facility]  Call: [phone]    [facility]  Call: [phone]    [facility]  Call: [phone]    [facility]     Discharged by : Marie Caballero CMA    If you have any questions regarding your visit please contact your care team:     Team Cha              Clinic Hours Telephone Number     Dr. Rah Andersen, CNP   7am-7pm  Monday - Thursday   7am-5pm  Fridays  [phone]   (Appointment scheduling available 24/7)     RN Line  [phone] option 2     Urgent Care - [place] and [place] - 11am-9pm Monday-Friday Saturday-Sunday- 9am-5pm     [place] -   5pm-9pm Monday-Friday Saturday-Sunday- 9am-5pm    [phone] - [place]    [phone] - [place]     For a Price Quote for your services, please call our Consumer Price Line at [phone].     What options do I have for visits at the clinic other than the traditional office visit?     To expand how we care for you, many of our providers are utilizing electronic visits (e-visits) and telephone visits, when medically appropriate, for interactions with their patients rather than a visit in the clinic. We also offer nurse visits for many medical concerns. Just like any other service, we will bill your insurance company for this type of visit based on time spent on the phone with your provider. Not all insurance companies cover these visits. Please check with your medical insurance if this type of visit is covered. You will be responsible for any charges that are not paid by your insurance.     E-visits via Adnexus: generally incur a $45.00 fee.     Telephone visits:  Time spent on the phone: *charged based on time that is spent on the phone in increments of 10  minutes. Estimated cost:   5-10 mins $30.00   11-20 mins. $59.00   21-30 mins. $85.00       Use Eventful (secure email communication and access to your chart) to send your primary care provider a message or make an appointment. Ask someone on your Team how to sign up for Eventful.     As always, Thank you for trusting us with your health care needs!      [place] [facility]:    Scheduling Appointments  [place]  Call: [phone]    [place] [facility]  Call: [phone]    [facility]  Call: [phone]    For Gastroenterology referrals   [facility] G[facility]enterology   Clinics and Surgery Center, 4th Floor   [address] [place] [address]   Appointments: [phone]    WHERE TO GO FOR CARE?    Clinic    Make an appointment if you:       Are sick (cold, cough, flu, sore throat, earache or in pain).       Have a small injury (sprain, small cut, burn or broken bone).       Need a physical exam, Pap smear, vaccine or prescription refill.       Have questions about your health or medicines.    To reach us:      Call [phone] ([phone]). Open 24 hours every day. (For counseling services, call [phone].)    Log into Eventful at [email or web address]. (Visit [email or web address] to create an account.) Hospital emergency room    An emergency is a serious or life- threatening problem that must be treated right away.    Call 990 or get to the hospital if you have:      Very bad or sudden:            - Chest pain or pressure         - Bleeding         - Head or belly pain         - Dizziness or trouble seeing, walking or                          Speaking      Problems breathing      Blood in your vomit or you are coughing up blood      A major injury (knocked out, loss of a finger or limb, rape, broken bone protruding from skin)    A mental health crisis. (Or call the Mental Health Crisis line at 1-797.643.7738 or Suicide  Prevention Hotline at 1-488.136.4788.)    Open 24 hours every day. You don't need an appointment.     Urgent care    Visit urgent care for sickness or small injuries when the clinic is closed. You don't need an appointment. To check hours or find an urgent care near you, visit [email or web address]. Online care    Get online care from [facility] for more than 70 common problems, like colds, allergies and infections. Open 24 hours every day at:   [email or web address]   Need help deciding?    For advice about where to be seen, you may call your clinic and ask to speak with a nurse. We're here for you 24 hours every day.         If you are deaf or hard of hearing, please let us know. We provide many free services including sign language interpreters, oral interpreters, TTYs, telephone amplifiers, note takers and written materials.

## 2019-11-14 NOTE — PROGRESS NOTES
"Subjective     Elly Ballard is a 44 year old female who presents to clinic today for the following health issues:    HPI   Has IBS was triaged yesterday     ABDOMINAL PAIN    Patient presents with intermittent right upper quadrant pain onset a few months ago. Pain recently worsened and was more sharp yesterday. With worsened pain she has difficulty sleeping on her right side and taking deep breaths. Pain is exacerbated with eating. Denies fever, diarrhea, dysuria, or hematuria. Cofirms constipation.    She gave her sister her right kidney in April 2019 and notes the recovery went well. She also reports anxiety induced IBS. Took calcium based laxatives and develipoed calcium based kidney stones. Her most recent CT scan in 2016 indicated gallbladder \"sludge\".       Onset: few months ago off and on     Description:   Character: Sharp and Dull ache  Location: right upper quadrant  Radiation: Back    Intensity: 3/10    Progression of Symptoms:  More frequent    Accompanying Signs & Symptoms:  Fever/Chills?: no   Gas/Bloating: YES  Nausea: no   Vomitting: no   Diarrhea?: YES- when she took a laxative  Constipation:YES  Dysuria or Hematuria: no    History:   Trauma: YES- donated kidney in April  Previous similar pain: YES- was lower   Previous tests done: no    Precipitating factors:   Does the pain change with:     Food: YES- a couple hours after eating the pain is the worst     BM: no     Urination: no     Alleviating factors:  After BM helps but not complete relief    Therapies Tried and outcome: none    LMP:  not applicable       BP Readings from Last 3 Encounters:   11/14/19 116/76   05/06/19 103/70   04/19/19 109/74    Wt Readings from Last 3 Encounters:   11/14/19 57.2 kg (126 lb)   05/06/19 54.4 kg (119 lb 14.4 oz)   04/19/19 59.1 kg (130 lb 6.4 oz)          Reviewed and updated as needed this visit by Provider         Review of Systems   ROS COMP: Constitutional, HEENT, cardiovascular, pulmonary, gi and gu " "systems are negative, except as otherwise noted.    This document serves as a record of the services and decisions personally performed and made by Rah Wilson MD. It was created on her behalf by Nasra Santiago, a trained medical scribe. The creation of this document is based on the provider's statements to the medical scribe.  Nasra Santiago 12:19 PM 11/14/2019          Objective    /76 (BP Location: Right arm, Patient Position: Sitting, Cuff Size: Adult Regular)   Pulse 64   Temp 97.8  F (36.6  C) (Oral)   Wt 57.2 kg (126 lb)   BMI 19.73 kg/m    Body mass index is 19.73 kg/m .  Physical Exam   GENERAL: healthy, alert and no distress  RESP: lungs clear to auscultation - no rales, rhonchi or wheezes  CV: regular rate and rhythm, normal S1 S2, no S3 or S4, no murmur, click or rub, no peripheral edema and peripheral pulses strong  ABDOMEN: tender in RUQ, positive Corley's sign, soft, no hepatosplenomegaly, no masses   BACK: no true CVA tenderness  PSYCH: mentation appears normal, affect normal/bright    Diagnostic Test Results:  Labs reviewed in Epic  none         Assessment & Plan     (R10.11) Abdominal pain, right upper quadrant  (primary encounter diagnosis)  Comment: Intermitted RUQ pain onset a few months with recent exacerbation.  Positive Corley's sign. I have reviewed imaging labs. CT scan in December 2016 showed gallbladder \"sludge\".   Plan: INFLUENZA VACCINE IM > 6 MONTHS VALENT IIV4         [32142], CBC with platelets, Comprehensive         metabolic panel (BMP + Alb, Alk Phos, ALT, AST,        Total. Bili, TP), Lipase, US Abdomen Limited,         *UA reflex to Microscopic and Culture ([facility]         and [facility] (except [place] and         [place])        Ordered labs, ultrasound, and UA. I will follow up with treatment options  pending lab results.            Patient Instructions     Labs today.    Follow up for ultrasound. I will contact you with results.    [facility] [facility]:    Sched[facility] Appointments  [facility]  Call: [phone]    [facility]  Call: [phone]    [facility]  Call: [phone]    [facility]     Discharged by : Marie Caballero CMA    If you have any questions regarding your visit please contact your care team:     Team Cha              Clinic Hours Telephone Number     Dr. Rah Andersen, CNP   7am-7pm  Monday - Thursday   7am-5pm  Fridays  [phone]   (Appointment scheduling available 24/7)     RN Line  [phone] option 2     Urgent Care - [place] and [place] [facility] - 11am-9pm Monday-Friday Saturday-Sunday- 9am-5pm     [place] -   5pm-9pm Monday-Friday Saturday-Sunday- 9am-5pm    [phone] - [place]    [phone] - [place]     For a Price Quote for your services, please call our Consumer Price Line at [phone].     What options do I have for visits at the clinic other than the traditional office visit?     To expand how we care for you, many of our providers are utilizing electronic visits (e-visits) and telephone visits, when medically appropriate, for interactions with their patients rather than a visit in the clinic. We also offer nurse visits for many medical concerns. Just like any other service, we will bill your insurance company for this type of visit based on time spent on the phone with your provider. Not all insurance companies cover these visits. Please check with your medical insurance if this type of visit is covered. You will be responsible for any charges that are not paid by your insurance.     E-visits via Benkyo Player: generally incur a $45.00 fee.     Telephone visits:  Time spent on the phone: *charged based on time that is spent on the phone in increments of 10 minutes. Estimated cost:   5-10 mins $30.00   11-20 mins. $59.00   21-30 mins. $85.00       Use  [employer]Kraf[employer]art (secure email communication and access to your chart) to send your primary care provider a message or make an appointment. Ask someone on your Team how to sign up for [employer].     As always, Thank you for trusting us with your health care needs!      [place] [facility]:    Scheduling Appointments  [facility]  Call: [phone]    [place] [facility] [facility]  Call: [phone]    [facility]  Call: [phone]    For Gastroenterology referrals   [facility] Gastroenterology   Clinics and Surgery Center, 4th Floor   [address] [place] [address]   Appointments: [phone]    WHERE TO GO FOR CARE?    Clinic    Make an appointment if you:       Are sick (cold, cough, flu, sore throat, earache or in pain).       Have a small injury (sprain, small cut, burn or broken bone).       Need a physical exam, Pap smear, vaccine or prescription refill.       Have questions about your health or medicines.    To reach us:      Call [phone] ([phone]). Open 24 hours every day. (For counseling services, call [phone].)    Log into [employer] at [email or web address]. (Visit [email or web address] to create an account.) Hospital emergency room    An emergency is a serious or life- threatening problem that must be treated right away.    Call 894 or get to the hospital if you have:      Very bad or sudden:            - Chest pain or pressure         - Bleeding         - Head or belly pain         - Dizziness or trouble seeing, walking or                          Speaking      Problems breathing      Blood in your vomit or you are coughing up blood      A major injury (knocked out, loss of a finger or limb, rape, broken bone protruding from skin)    A mental health crisis. (Or call the Mental Health Crisis line at 1-969.499.4969 or Suicide Prevention Hotline at 1-353.430.3792.)    Open 24 hours every day. You don't need an  appointment.     Urgent care    Visit urgent care for sickness or small injuries when the clinic is closed. You don't need an appointment. To check hours or find an urgent care near you, visit [email or web address]. Online care    Get online care from [facility] for more than 70 common problems, like colds, allergies and infections. Open 24 hours every day at:   [email or web address]   Need help deciding?    For advice about where to be seen, you may call your clinic and ask to speak with a nurse. We're here for you 24 hours every day.         If you are deaf or hard of hearing, please let us know. We provide many free services including sign language interpreters, oral interpreters, TTYs, telephone amplifiers, note takers and written materials.                     No follow-ups on file.    The information in this document, created by the medical scribe, Nasra Santiago, for me, accurately reflects the services I personally performed and the decisions made by me. I have reviewed and approved this document for accuracy prior to leaving the patient care area.    Rah Wilson MD, MD  [facility]

## 2019-11-15 ENCOUNTER — NURSE TRIAGE (OUTPATIENT)
Dept: NURSING | Facility: CLINIC | Age: 44
End: 2019-11-15

## 2019-11-15 ENCOUNTER — NURSE TRIAGE (OUTPATIENT)
Dept: FAMILY MEDICINE | Facility: CLINIC | Age: 44
End: 2019-11-15

## 2019-11-15 LAB
ALBUMIN SERPL-MCNC: 4 G/DL (ref 3.4–5)
ALP SERPL-CCNC: 55 U/L (ref 40–150)
ALT SERPL W P-5'-P-CCNC: 12 U/L (ref 0–50)
ANION GAP SERPL CALCULATED.3IONS-SCNC: 6 MMOL/L (ref 3–14)
AST SERPL W P-5'-P-CCNC: 8 U/L (ref 0–45)
BILIRUB SERPL-MCNC: 0.2 MG/DL (ref 0.2–1.3)
BUN SERPL-MCNC: 11 MG/DL (ref 7–30)
CALCIUM SERPL-MCNC: 8.8 MG/DL (ref 8.5–10.1)
CHLORIDE SERPL-SCNC: 106 MMOL/L (ref 94–109)
CO2 SERPL-SCNC: 25 MMOL/L (ref 20–32)
CREAT SERPL-MCNC: 0.89 MG/DL (ref 0.52–1.04)
GFR SERPL CREATININE-BSD FRML MDRD: 79 ML/MIN/{1.73_M2}
GLUCOSE SERPL-MCNC: 90 MG/DL (ref 70–99)
POTASSIUM SERPL-SCNC: 4.4 MMOL/L (ref 3.4–5.3)
PROT SERPL-MCNC: 7.2 G/DL (ref 6.8–8.8)
SODIUM SERPL-SCNC: 137 MMOL/L (ref 133–144)

## 2019-11-15 ASSESSMENT — ANXIETY QUESTIONNAIRES: GAD7 TOTAL SCORE: 7

## 2019-11-15 NOTE — TELEPHONE ENCOUNTER
Patient was called back.  She already understands that she could possibly have a gallbladder problem.  She would like advice on what she can do to reduce the pain until her ultrasound.    Huddled with a provider.  Suggested very bland diet, bananas, oatmeal, low fat foods, soup broths, and increase hydration since she has a history of constipation as well. A bland diet will help reduce extra bile production. Take Tylenol for discomfort.    Call back if pain becomes severe or fever present with vomiting.      Patient verbalized understanding and will implement these suggestions       Erika Zhou RN

## 2019-11-15 NOTE — TELEPHONE ENCOUNTER
"Patient states she was seen at [facility] yesterday for upper Right abdominal pain.   States concern about increased pain at night and what foods she can eat.  [facility] has scheduled an US for Monday 11/18/19.  Labs pending by report.    States pain got \"worse last night.\" Describes as \"7-8\" on a 1-10 pain scale.   States had \"some relief with 1 Tylenol and heating pad.\"   Reports slight difficulty breathing with increased pain.  Caller thinks it was due to eating small apple sauce at 5:00pm yesterday.  Currently pain level is \"3.\"   Denies difficulty breathing.  Afebrile.  Denies nausea. No vomiting.  Tolerating fluids well.  Requests information re plan of care until seen for  11/18/19.    Protocol-  Abdominal Pain- Upper-Adult  Care advice reviewed.   Disposition-  See Physician within 4 hours   Will call Provider Team at [facility] for RN follow up with Patient.  This RN called [facility] and spoke with Dr. Wilson's care team. Advised the RN will call the Patient now to follow up.  Caller states understanding of the recommended disposition.   Advised to call back if further questions or concerns.     SONY JohnsonN RN  [facility]     Reason for Disposition    [1] MILD-MODERATE pain AND [2] constant AND [3] present > 2 hours    Additional Information    Negative: Severe difficulty breathing (e.g., struggling for each breath, speaks in single words)    Negative: Shock suspected (e.g., cold/pale/clammy skin, too weak to stand, low BP, rapid pulse)    Negative: Difficult to awaken or acting confused (e.g., disoriented, slurred speech)    Negative: Passed out (i.e., lost consciousness, collapsed and was not responding)    Negative: Visible sweat on face or sweat dripping down face    Negative: Sounds like a life-threatening emergency to the triager    Negative: [1] SEVERE pain (e.g., excruciating) AND [2] present > 1 hour    Negative: [1] Pain lasts > 10 minutes AND [2] age > 50    Negative: [1] Pain " "lasts > 10 minutes AND [2] age > 40 AND [3] associated chest, arm, neck, upper back or jaw pain    Negative: [1] Pain lasts > 10 minutes AND [2] age > 35 AND [3] at least one cardiac risk factor (i.e., hypertension, diabetes, obesity, smoker or strong family history of heart disease)    Negative: [1] Pain lasts > 10 minutes AND [2] history of heart disease (i.e., heart attack, bypass surgery, angina, angioplasty, CHF; not just a heart murmur)    Negative: [1] Pain lasts > 10 minutes AND [2] difficulty breathing    Negative: [1] Vomiting AND [2] contains red blood  (Exception: few streaks and only occurred once)    Negative: [1] Vomiting AND [2] contains black (\"coffee ground\") material    Negative: Blood in bowel movements  (Exception: Blood on surface of BM with constipation)    Negative: Black or tarry bowel movements  (Exception: chronic-unchanged  black-grey bowel movements AND is taking iron pills or Pepto-bismol)    Negative: [1] Pregnant > 24 weeks AND [2] hand or face swelling    Negative: Patient sounds very sick or weak to the triager    Protocols used: ABDOMINAL PAIN - UPPER-A-AH    "

## 2019-11-15 NOTE — TELEPHONE ENCOUNTER
Reason for Call:  Other     Detailed comments: Patient had been seen for lower abdominal pain and told to schedule an ultrasound. She is having extreme pain at night and would like to know she should do for the three days she has to wait for the ultrasound. FNA called and relayed information and said to please call patient directly.    Phone Number Patient can be reached at: Cell number on file:    Telephone Information:   Mobile [phone]    or Other phone number:      Best Time:     Can we leave a detailed message on this number? YES    Call taken on 11/15/2019 at 11:21 AM by Sommer Moore

## 2019-11-18 ENCOUNTER — ANCILLARY PROCEDURE (OUTPATIENT)
Dept: ULTRASOUND IMAGING | Facility: CLINIC | Age: 44
End: 2019-11-18
Attending: FAMILY MEDICINE
Payer: COMMERCIAL

## 2019-11-18 DIAGNOSIS — R10.11 ABDOMINAL PAIN, RIGHT UPPER QUADRANT: ICD-10-CM

## 2019-11-19 ENCOUNTER — TELEPHONE (OUTPATIENT)
Dept: FAMILY MEDICINE | Facility: CLINIC | Age: 44
End: 2019-11-19

## 2019-11-19 NOTE — TELEPHONE ENCOUNTER
Called and reviewed. Her pain has improved but still present. Labs normal and US normal except for hemangiomas which are stable over 3 years.     Reviewed top monitor at this time. Since improved suddenly last Thursday she may have passed a small gallstone. Her presentation was consistent with cholelithiasis.    Advised to follow up with her PCP if persists.     I will forward this to Nika Briscoe as an FYI

## 2019-11-19 NOTE — TELEPHONE ENCOUNTER
Called to reviwe US. Overall normal. Probable hemangiomas of liver noted. No changes from 2016 CT    Left message. Will call again.

## 2020-04-17 NOTE — LETTER
1/4/2019       RE: Elly Ballard  [address] [place] [address]     Dear Colleague,    Thank you for referring your patient, Elly Ballard, to the [facility] [place] [facility] at [facility]. Please see a copy of my visit note below.    Assessment and Plan:  1. Prospective kidney transplant donor with a few issues that need to be addressed prior to donation. Patient's blood pressure is acceptable at this visit, kidney function appears to be acceptable with Iohexol pending, and urinalysis is bland.    A. History of renal stone, litholink was completed. Will need to review   B. Anxiety an depression, followed by a therapist and will need to make sure she is cleared for donation and to ensure close follow up after donation     Discussed the risks of donating a kidney, including the surgical risk and the possible risks of living with one kidney.    Education about expected post-donation kidney function and how chronic kidney disease (CKD) and end stage kidney disease (ESKD) might potentially impact the donor in the future, include, but not limited to:       - On average, donors will have 25-35% permanent loss of kidney function at donation.       - Baseline risk of ESKD may slightly exceed that of members of the general population with the same demographic profile.       - Donor risks must be interpreted in light of known epidemiology of both CKD or ESKD, such as that CKD generally develops in midlife (40-50 years old) and ESKD generally develops after age 60.       - Medical evaluation of young potential donors cannot predict lifetime risk of CKD or ESKD.       - Donors may be at higher risk for CKD if they sustain damage to the remaining kidney.       - Development of CKD and progression of ESKD may be more rapid with only 1 kidney.       - Some type of kidney replacement therapy, either kidney transplant or dialysis, is required when reaching ESKD.    Potential medical  [address] Patient Status:  Hospital Outpatient Surgery   Age/Gender 61year old female MRN [id number]   Location [address] [place] [address]. Attending Tobi Castro MD   Hosp Day # 0 PCP Ramandeep Johnston MD       An or surgical risks include, but not limited to:       - Death.       - Scars, pain, fatigue, and other consequences typical of any surgical procedure.       - Decreased kidney function.       - Abdominal or bowel symptoms, such as bloating and nausea, and developing bowel obstruction.       - Kidney failure (ESKD) and the need for a kidney transplant or dialysis for the donor.       - Impact of obesity, hypertension, or other donor-specific medical conditions on morbidity and mortality of the potential donor.    Patients overall evaluation will be discussed with the transplant team and a final recommendation on the patients' suitability to be a kidney transplant donor will be made at that time.    Consult:  Elly Ballard was seen in consultation at the request of Dr. Pasha Ellis for evaluation as a potential kidney transplant donor.    Reason for Visit:  Elly Ballard is a 43 year old female who presents for a kidney donor evaluation.  Patient would like to donate to a Sister.    HPI:      Elly is here for evaluation as a donor.  She is interested in donation to her sister who was diagnosed with congenital renal disease and obstructive uropathy.  Elly feels generally healthy.  She has history of renal stones and she had completed a litho-link that will need to be reviewed.  She currently reports no symptoms regarding stones or other issues.  In 2016, she was hospitalized with UTI and her creatinine lakeisha to 0.8 mg/dL.  Subsequently, this had resolved.  She has situational depression for which she is followed by therapist and she attributes that to her ongoing stress due to her daughter's behavioral diagnoses.  She works part-time, she exercises at least once a week.  Her  is very supportive of her decision.           Kidney Disease Hx:       h/o Kidney Problems: No  Family h/o Genetic Kidney Disease: No       h/o HTN: No    Usual BP: 110/70       h/o Protein in Urine: No  h/o Blood in Urine:  No       h/o Kidney Stones: Yes   h/o Kidney Injury: very mild in 2016 due to UTI       h/o Recurrent UTI: No  h/o Genitourinary Problems: No       h/o Chronic NSAID Use: No         Other Medical Hx:       h/o DM: No   h/o Gestational DM: No       h/o Preeclampsia: No          h/o Gastrointestinal, Pancreas or Liver Problems: Yes: IBS questionable 2008       h/o Lung or Heart Problems: No       h/o Hematologic Problems: No  h/o Bleeding or Clotting Problems: No       h/o Cancer: No       h/o Infection Problems: No         Skin Cancer Risk:       h/o more than 50 moles: No       h/o extensive sun exposure: Yes        h/o melanoma: No       Family h/o melanoma: No         Mental Health Assessment:       h/o Depression: Yes: controlled not on medications situational        h/o Psychiatric Illness: No       h/o Suicidal Attempt(s): No    ROS:   A comprehensive review of systems was obtained and negative, except as noted in the HPI or PMH.  IBS related nausea     PMH:   History was taken from the patient as noted below.  Past Medical History:   Diagnosis Date     Anxiety     controlled not on medications     IBS (irritable bowel syndrome)        PSH:   Past Surgical History:   Procedure Laterality Date     NO HISTORY OF SURGERY       Personal or family history of anesthesia problems: No    Family Hx:   Family History   Problem Relation Age of Onset     Pulmonary Embolism Mother      Depression Mother      Anxiety Disorder Mother      Hypertension Mother      Family History Negative Father      Chronic Kidney Disease Sister         anatomical      Family History Negative Brother      Glaucoma No family hx of      Macular Degeneration No family hx of           Specific Family Hx:       FH of DM: Yes        FH of CAD: No  FH of HTN: Yes        FH of Cancer: prostate cancer   FH of Kidney Cancer: No    Personal Hx:   Social History     Socioeconomic History     Marital status:      Spouse name: Not on file     Number  "of children: Not on file     Years of education: Not on file     Highest education level: Not on file   Social Needs     Financial resource strain: Not on file     Food insecurity - worry: Not on file     Food insecurity - inability: Not on file     Transportation needs - medical: Not on file     Transportation needs - non-medical: Not on file   Occupational History     Comment:  for a non profit orgnization    Tobacco Use     Smoking status: Never Smoker     Smokeless tobacco: Never Used   Substance and Sexual Activity     Alcohol use: Yes     Comment: 3/week     Drug use: No     Sexual activity: Yes     Partners: Male   Other Topics Concern     Parent/sibling w/ CABG, MI or angioplasty before 65F 55M? Not Asked   Social History Narrative     Not on file          Specific Social Hx:       Health Insurance Status: Yes       Employment Status: Part time  Occupation: manages a P orgnization                       Living Arrangements: lives with their spouse       Social Support: Yes       Presence of increased risk for disease transmission behaviors as defined by [facility] guidelines: No        Allergies:  Allergies   Allergen Reactions     Seasonal Allergies        Medications:  Prior to Admission medications    Medication Sig Start Date End Date Taking? Authorizing Provider   cholecalciferol (VITAMIN D3) 82153 units capsule Take 1 capsule (50,000 Units) by mouth once a week 4/20/18   Quyen Faye MD       Vitals:  Vital Signs 1/4/2019 1/4/2019 1/4/2019   Systolic 101 108 114   Diastolic 63 71 72   Pulse 83 - -   Temperature 97.7 - -   Respirations - - -   Weight (LB) 125 lb 14.4 oz - -   Height 5' 7\" - -   BMI (Calculated) 19.76 - -   Pain - - -   O2 99 - -       Exam:   GENERAL APPEARANCE: alert and no distress  HENT: mouth without ulcers or lesions  LYMPHATICS: no cervical or supraclavicular nodes  RESP: lungs clear to auscultation - no rales, rhonchi or wheezes  CV: regular rhythm, normal rate, " no rub, no murmur  EDEMA: no LE edema bilaterally  ABDOMEN: soft, nondistended, nontender, bowel sounds normal  MS: extremities normal - no gross deformities noted, no evidence of inflammation in joints, no muscle tenderness  SKIN: no rash    Results:   Labs and imaging were ordered for this visit and reviewed by me.  Recent Results (from the past 336 hour(s))   Uric acid    Collection Time: 01/04/19  7:49 AM   Result Value Ref Range    Uric Acid 3.2 2.6 - 6.0 mg/dL   Partial thromboplastin time    Collection Time: 01/04/19  7:49 AM   Result Value Ref Range    PTT 32 22 - 37 sec   CBC with platelets    Collection Time: 01/04/19  7:49 AM   Result Value Ref Range    WBC 5.5 4.0 - 11.0 10e9/L    RBC Count 4.44 3.8 - 5.2 10e12/L    Hemoglobin 13.4 11.7 - 15.7 g/dL    Hematocrit 39.7 35.0 - 47.0 %    MCV 89 78 - 100 fl    MCH 30.2 26.5 - 33.0 pg    MCHC 33.8 31.5 - 36.5 g/dL    RDW 12.2 10.0 - 15.0 %    Platelet Count 223 150 - 450 10e9/L   INR    Collection Time: 01/04/19  7:49 AM   Result Value Ref Range    INR 1.10 0.86 - 1.14   Hemoglobin A1c    Collection Time: 01/04/19  7:49 AM   Result Value Ref Range    Hemoglobin A1C 5.2 0 - 5.6 %   Phosphorus    Collection Time: 01/04/19  7:49 AM   Result Value Ref Range    Phosphorus 3.2 2.5 - 4.5 mg/dL   HCG quantitative pregnancy    Collection Time: 01/04/19  7:49 AM   Result Value Ref Range    HCG Quantitative Serum <1 0 - 5 IU/L   Comprehensive metabolic panel    Collection Time: 01/04/19  7:49 AM   Result Value Ref Range    Sodium 140 133 - 144 mmol/L    Potassium 3.6 3.4 - 5.3 mmol/L    Chloride 107 94 - 109 mmol/L    Carbon Dioxide 27 20 - 32 mmol/L    Anion Gap 6 3 - 14 mmol/L    Glucose 86 70 - 99 mg/dL    Urea Nitrogen 8 7 - 30 mg/dL    Creatinine 0.62 0.52 - 1.04 mg/dL    GFR Estimate >90 >60 mL/min/[1.73_m2]    GFR Estimate If Black >90 >60 mL/min/[1.73_m2]    Calcium 8.4 (L) 8.5 - 10.1 mg/dL    Bilirubin Total 0.5 0.2 - 1.3 mg/dL    Albumin 4.2 3.4 - 5.0 g/dL     Protein Total 7.3 6.8 - 8.8 g/dL    Alkaline Phosphatase 44 40 - 150 U/L    ALT 20 0 - 50 U/L    AST 14 0 - 45 U/L   Lipid Profile    Collection Time: 01/04/19  7:49 AM   Result Value Ref Range    Cholesterol 200 (H) <200 mg/dL    Triglycerides 99 <150 mg/dL    HDL Cholesterol 65 >49 mg/dL    LDL Cholesterol Calculated 115 (H) <100 mg/dL    Non HDL Cholesterol 135 (H) <130 mg/dL   ABO/Rh type and screen    Collection Time: 01/04/19  7:50 AM   Result Value Ref Range    ABO O     RH(D) Pos     Antibody Screen Neg     Test Valid Only At          [facility]    Specimen Expires 01/07/2019    Protein  random urine with Creat Ratio    Collection Time: 01/04/19  7:59 AM   Result Value Ref Range    Protein Random Urine 0.10 g/L    Protein Total Urine g/gr Creatinine 0.05 0 - 0.2 g/g Cr   Albumin Random Urine Quantitative with Creat Ratio    Collection Time: 01/04/19  7:59 AM   Result Value Ref Range    Creatinine Urine 214 mg/dL    Albumin Urine mg/L 14 mg/L    Albumin Urine mg/g Cr 6.31 0 - 25 mg/g Cr   Routine UA with microscopic    Collection Time: 01/04/19  7:59 AM   Result Value Ref Range    Color Urine Yellow     Appearance Urine Clear     Glucose Urine Negative NEG^Negative mg/dL    Bilirubin Urine Negative NEG^Negative    Ketones Urine Negative NEG^Negative mg/dL    Specific Gravity Urine 1.015 1.003 - 1.035    Blood Urine Moderate (A) NEG^Negative    pH Urine 5.0 5.0 - 7.0 pH    Protein Albumin Urine Negative NEG^Negative mg/dL    Urobilinogen mg/dL 0.0 0.0 - 2.0 mg/dL    Nitrite Urine Negative NEG^Negative    Leukocyte Esterase Urine Negative NEG^Negative    Source Midstream Urine     WBC Urine 1 0 - 5 /HPF    RBC Urine 2 0 - 2 /HPF    Bacteria Urine Few (A) NEG^Negative /HPF    Squamous Epithelial /HPF Urine 2 (H) 0 - 1 /HPF    Mucous Urine Present (A) NEG^Negative /LPF       Again, thank you for allowing me to participate in the care of your patient.       Sincerely,     Kidney Donor Eval

## 2020-11-22 ENCOUNTER — HEALTH MAINTENANCE LETTER (OUTPATIENT)
Age: 45
End: 2020-11-22

## 2021-02-13 ENCOUNTER — HEALTH MAINTENANCE LETTER (OUTPATIENT)
Age: 46
End: 2021-02-13

## 2021-04-06 ENCOUNTER — OFFICE VISIT (OUTPATIENT)
Dept: FAMILY MEDICINE | Facility: CLINIC | Age: 46
End: 2021-04-06
Payer: COMMERCIAL

## 2021-04-06 VITALS
OXYGEN SATURATION: 100 % | HEART RATE: 67 BPM | HEIGHT: 67 IN | BODY MASS INDEX: 20.57 KG/M2 | WEIGHT: 131.08 LBS | TEMPERATURE: 97.2 F | DIASTOLIC BLOOD PRESSURE: 63 MMHG | SYSTOLIC BLOOD PRESSURE: 96 MMHG

## 2021-04-06 DIAGNOSIS — Z90.5 HISTORY OF KIDNEY DONATION: ICD-10-CM

## 2021-04-06 DIAGNOSIS — F43.9 STRESS: ICD-10-CM

## 2021-04-06 DIAGNOSIS — Z12.31 ENCOUNTER FOR SCREENING MAMMOGRAM FOR BREAST CANCER: ICD-10-CM

## 2021-04-06 DIAGNOSIS — Z13.220 SCREENING FOR LIPID DISORDERS: ICD-10-CM

## 2021-04-06 DIAGNOSIS — Z00.00 ROUTINE GENERAL MEDICAL EXAMINATION AT A HEALTH CARE FACILITY: Primary | ICD-10-CM

## 2021-04-06 LAB
ALBUMIN UR-MCNC: NEGATIVE MG/DL
ANION GAP SERPL CALCULATED.3IONS-SCNC: 7 MMOL/L (ref 3–14)
APPEARANCE UR: CLEAR
BILIRUB UR QL STRIP: NEGATIVE
BUN SERPL-MCNC: 11 MG/DL (ref 7–30)
CALCIUM SERPL-MCNC: 9.2 MG/DL (ref 8.5–10.1)
CHLORIDE SERPL-SCNC: 108 MMOL/L (ref 94–109)
CHOLEST SERPL-MCNC: 173 MG/DL
CO2 SERPL-SCNC: 26 MMOL/L (ref 20–32)
COLOR UR AUTO: YELLOW
CREAT SERPL-MCNC: 0.92 MG/DL (ref 0.52–1.04)
CREAT UR-MCNC: 76 MG/DL
GFR SERPL CREATININE-BSD FRML MDRD: 75 ML/MIN/{1.73_M2}
GLUCOSE SERPL-MCNC: 68 MG/DL (ref 70–99)
GLUCOSE UR STRIP-MCNC: NEGATIVE MG/DL
HDLC SERPL-MCNC: 65 MG/DL
HGB UR QL STRIP: ABNORMAL
KETONES UR STRIP-MCNC: NEGATIVE MG/DL
LDLC SERPL CALC-MCNC: 87 MG/DL
LEUKOCYTE ESTERASE UR QL STRIP: NEGATIVE
MICROALBUMIN UR-MCNC: 8 MG/L
MICROALBUMIN/CREAT UR: 10.57 MG/G CR (ref 0–25)
NITRATE UR QL: NEGATIVE
NONHDLC SERPL-MCNC: 108 MG/DL
PH UR STRIP: 5.5 PH (ref 5–7)
POTASSIUM SERPL-SCNC: 4.4 MMOL/L (ref 3.4–5.3)
PROT UR-MCNC: 0.07 G/L
PROT/CREAT 24H UR: 0.1 G/G CR (ref 0–0.2)
RBC #/AREA URNS AUTO: NORMAL /HPF
SODIUM SERPL-SCNC: 141 MMOL/L (ref 133–144)
SOURCE: ABNORMAL
SP GR UR STRIP: 1.02 (ref 1–1.03)
TRIGL SERPL-MCNC: 106 MG/DL
UROBILINOGEN UR STRIP-ACNC: 0.2 EU/DL (ref 0.2–1)
WBC #/AREA URNS AUTO: NORMAL /HPF

## 2021-04-06 PROCEDURE — 84156 ASSAY OF PROTEIN URINE: CPT | Performed by: STUDENT IN AN ORGANIZED HEALTH CARE EDUCATION/TRAINING PROGRAM

## 2021-04-06 PROCEDURE — 36415 COLL VENOUS BLD VENIPUNCTURE: CPT | Performed by: STUDENT IN AN ORGANIZED HEALTH CARE EDUCATION/TRAINING PROGRAM

## 2021-04-06 PROCEDURE — 99396 PREV VISIT EST AGE 40-64: CPT | Performed by: STUDENT IN AN ORGANIZED HEALTH CARE EDUCATION/TRAINING PROGRAM

## 2021-04-06 PROCEDURE — 80061 LIPID PANEL: CPT | Performed by: STUDENT IN AN ORGANIZED HEALTH CARE EDUCATION/TRAINING PROGRAM

## 2021-04-06 PROCEDURE — 80048 BASIC METABOLIC PNL TOTAL CA: CPT | Performed by: STUDENT IN AN ORGANIZED HEALTH CARE EDUCATION/TRAINING PROGRAM

## 2021-04-06 PROCEDURE — 81001 URINALYSIS AUTO W/SCOPE: CPT | Performed by: STUDENT IN AN ORGANIZED HEALTH CARE EDUCATION/TRAINING PROGRAM

## 2021-04-06 PROCEDURE — 82043 UR ALBUMIN QUANTITATIVE: CPT | Performed by: STUDENT IN AN ORGANIZED HEALTH CARE EDUCATION/TRAINING PROGRAM

## 2021-04-06 PROCEDURE — 99213 OFFICE O/P EST LOW 20 MIN: CPT | Mod: 25 | Performed by: STUDENT IN AN ORGANIZED HEALTH CARE EDUCATION/TRAINING PROGRAM

## 2021-04-06 ASSESSMENT — ENCOUNTER SYMPTOMS
CHILLS: 0
HEMATURIA: 0
CONSTIPATION: 0
DIARRHEA: 0
ABDOMINAL PAIN: 0
HEMATOCHEZIA: 0
COUGH: 0

## 2021-04-06 ASSESSMENT — ANXIETY QUESTIONNAIRES
5. BEING SO RESTLESS THAT IT IS HARD TO SIT STILL: NOT AT ALL
GAD7 TOTAL SCORE: 4
7. FEELING AFRAID AS IF SOMETHING AWFUL MIGHT HAPPEN: NOT AT ALL
1. FEELING NERVOUS, ANXIOUS, OR ON EDGE: SEVERAL DAYS
6. BECOMING EASILY ANNOYED OR IRRITABLE: NOT AT ALL
GAD7 TOTAL SCORE: 4
2. NOT BEING ABLE TO STOP OR CONTROL WORRYING: SEVERAL DAYS
4. TROUBLE RELAXING: SEVERAL DAYS
3. WORRYING TOO MUCH ABOUT DIFFERENT THINGS: SEVERAL DAYS
7. FEELING AFRAID AS IF SOMETHING AWFUL MIGHT HAPPEN: NOT AT ALL
GAD7 TOTAL SCORE: 4

## 2021-04-06 ASSESSMENT — PATIENT HEALTH QUESTIONNAIRE - PHQ9
10. IF YOU CHECKED OFF ANY PROBLEMS, HOW DIFFICULT HAVE THESE PROBLEMS MADE IT FOR YOU TO DO YOUR WORK, TAKE CARE OF THINGS AT HOME, OR GET ALONG WITH OTHER PEOPLE: SOMEWHAT DIFFICULT
SUM OF ALL RESPONSES TO PHQ QUESTIONS 1-9: 4
SUM OF ALL RESPONSES TO PHQ QUESTIONS 1-9: 4

## 2021-04-06 ASSESSMENT — MIFFLIN-ST. JEOR: SCORE: 1272.2

## 2021-04-06 NOTE — PROGRESS NOTES
SUBJECTIVE:   CC: Elly Ballard is an 45 year old woman who presents for preventive health visit.     Patient has been advised of split billing requirements and indicates understanding: Yes  Healthy Habits:     Getting at least 3 servings of Calcium per day:  Yes    Bi-annual eye exam:  Yes    Dental care twice a year:  Yes    Sleep apnea or symptoms of sleep apnea:  None    Diet:  Gluten-free/reduced    Frequency of exercise:  2-3 days/week    Duration of exercise:  Less than 15 minutes    Taking medications regularly:  Yes    Medication side effects:  Not applicable    PHQ-2 Total Score: 1    Additional concerns today:  No    No specific concerns today other than feeling she would benefit from returning to psychotherapy. Previously has been seen for depressive symptoms and stress related to parenting two teenagers with additional health needs. Has not been on antidepressants in past. Would appreciate assistance with setting up therapy. She manages stress by gardening and spending time outdoors. Enjoys walking, jogging, biking.     Previously donated a kidney to her sister and needs routine monitoring labs to check kidney function.        Today's PHQ-2 Score:   PHQ-2 ( 1999 [employer]) 4/6/2021   Q1: Little interest or pleasure in doing things 0   Q2: Feeling down, depressed or hopeless 1   PHQ-2 Score 1   Q1: Little interest or pleasure in doing things Not at all   Q2: Feeling down, depressed or hopeless Several days   PHQ-2 Score 1     Abuse: Current or Past (Physical, Sexual or Emotional) - No  Do you feel safe in your environment? Yes    Have you ever done Advance Care Planning? (For example, a Health Directive, POLST, or a discussion with a medical provider or your loved ones about your wishes): No, advance care planning information given to patient to review.  Patient plans to discuss their wishes with loved ones or provider.      Social History     Tobacco Use     Smoking status: Never Smoker      "Smokeless tobacco: Never Used   Substance Use Topics     Alcohol use: Yes     Comment: 3/week     If you drink alcohol do you typically have >3 drinks per day or >7 drinks per week? No    Alcohol Use 4/6/2021   Prescreen: >3 drinks/day or >7 drinks/week? No   Prescreen: >3 drinks/day or >7 drinks/week? -     Reviewed orders with patient.  Reviewed health maintenance and updated orders accordingly - Yes  Lab work is in process    Breast Cancer Screening:    Breast CA Risk Assessment (FHS-7) 4/6/2021   Do you have a family history of breast, colon, or ovarian cancer? No / Unknown     Mammogram Screening: Recommended annual mammography   Last mammogram 2019.     Pertinent mammograms are reviewed under the imaging tab.    History of abnormal Pap smear: NO - age 30-65 PAP every 5 years with negative HPV co-testing recommended  PAP / HPV Latest Ref Rng & Units 5/4/2018   PAP - NIL   HPV 16 DNA NEG:Negative Negative   HPV 18 DNA NEG:Negative Negative   OTHER HR HPV NEG:Negative Negative     Reviewed and updated as needed this visit by clinical staff    Reviewed and updated as needed this visit by Provider                  Review of Systems   Constitutional: Negative for chills.   HENT: Negative for congestion.    Respiratory: Negative for cough.    Cardiovascular: Negative for chest pain.   Gastrointestinal: Negative for abdominal pain, constipation, diarrhea and hematochezia.   Genitourinary: Negative for hematuria.        OBJECTIVE:   BP 96/63   Pulse 67   Temp 97.2  F (36.2  C)   Ht 1.702 m (5' 7\")   Wt 59.5 kg (131 lb 1.3 oz)   LMP 03/16/2021 (Approximate)   SpO2 100%   BMI 20.53 kg/m    Physical Exam  GENERAL: healthy, alert and no distress  EYES: Eyes grossly normal to inspection, PERRL and conjunctivae and sclerae normal  HENT: ear canals and TM's normal, nose and mouth without ulcers or lesions  NECK: no adenopathy, no asymmetry, masses, or scars and thyroid normal to palpation  RESP: lungs clear to " "auscultation - no rales, rhonchi or wheezes  CV: regular rate and rhythm, normal S1 S2, no S3 or S4, no murmur, click or rub, no peripheral edema and peripheral pulses strong  ABDOMEN: soft, nontender, no hepatosplenomegaly, no masses and bowel sounds normal  MS: no gross musculoskeletal defects noted, no edema  SKIN: no suspicious lesions or rashes  NEURO: Normal strength and tone, mentation intact and speech normal  PSYCH: mentation appears normal, affect normal/bright    Diagnostic Test Results:  Lab pending    ASSESSMENT/PLAN:   1. Routine general medical examination at a health care facility  Also in need of monitoring labs due to kidney donation in 2019.  - Urine Microscopic    2. History of kidney donation  - Albumin Random Urine Quantitative with Creat Ratio  - *UA reflex to Microscopic and Culture ([facility] (except [place] and [place])  - Protein  random urine with Creat Ratio  - Basic metabolic panel  (Ca, Cl, CO2, Creat, Gluc, K, Na, BUN)    3. Screening for lipid disorders  - Lipid panel reflex to direct LDL Non-fasting    4. Encounter for screening mammogram for breast cancer  - *MA Screening Digital Bilateral; Future    5. Stress  Referral to therapy for resumption of services. Return as needed.   - MENTAL HEALTH REFERRAL  - Adult; Outpatient Treatment; Individual/Couples/Family/Group Therapy/Health Psychology; [facility]: [facility] [phone]; We will contact you to schedule the appointment or please call with any questions    Patient has been advised of split billing requirements and indicates understanding: Yes  COUNSELING:  Reviewed preventive health counseling, as reflected in patient instructions    Estimated body mass index is 20.53 kg/m  as calculated from the following:    Height as of this encounter: 1.702 m (5' 7\").    Weight as of this encounter: 59.5 kg (131 lb 1.3 oz).        She reports that she has never smoked. She has never used smokeless " tobacco.      Counseling Resources:  ATP IV Guidelines  Pooled Cohorts Equation Calculator  Breast Cancer Risk Calculator  BRCA-Related Cancer Risk Assessment: FHS-7 Tool  FRAX Risk Assessment  ICSI Preventive Guidelines  Dietary Guidelines for Americans, 2010  USDA's MyPlate  ASA Prophylaxis  Lung CA Screening    Dary Feliz MD  [facility]  Answers for HPI/ROS submitted by the patient on 4/6/2021   Annual Exam:  If you checked off any problems, how difficult have these problems made it for you to do your work, take care of things at home, or get along with other people?: Somewhat difficult  PHQ9 TOTAL SCORE: 4  JORDON 7 TOTAL SCORE: 4

## 2021-04-07 ASSESSMENT — PATIENT HEALTH QUESTIONNAIRE - PHQ9: SUM OF ALL RESPONSES TO PHQ QUESTIONS 1-9: 4

## 2021-04-07 ASSESSMENT — ANXIETY QUESTIONNAIRES: GAD7 TOTAL SCORE: 4

## 2021-04-07 NOTE — RESULT ENCOUNTER NOTE
Keith Sanabria,    Labs look OK but I am checking with the nephrologist you met with for your transplant consultation so I can also run the labs by him. Cholesterol looks great.    Dary Feliz MD  [facility]  [phone]

## 2021-04-09 NOTE — RESULT ENCOUNTER NOTE
Keith Sanabria,    Checking back in with you. Labs look good. Have a nice weekend.    Dary Feliz MD  [facility]  [phone]

## 2021-04-28 ENCOUNTER — ANCILLARY PROCEDURE (OUTPATIENT)
Dept: MAMMOGRAPHY | Facility: CLINIC | Age: 46
End: 2021-04-28
Attending: STUDENT IN AN ORGANIZED HEALTH CARE EDUCATION/TRAINING PROGRAM
Payer: COMMERCIAL

## 2021-04-28 DIAGNOSIS — Z12.31 ENCOUNTER FOR SCREENING MAMMOGRAM FOR BREAST CANCER: ICD-10-CM

## 2021-04-28 PROCEDURE — 77067 SCR MAMMO BI INCL CAD: CPT | Mod: GC

## 2021-04-28 PROCEDURE — 77063 BREAST TOMOSYNTHESIS BI: CPT | Mod: GC

## 2021-09-19 ENCOUNTER — HEALTH MAINTENANCE LETTER (OUTPATIENT)
Age: 46
End: 2021-09-19

## 2021-09-19 NOTE — PROGRESS NOTES
SUBJECTIVE:   Elly Ballard is a 43 year old female who presents to clinic today for the following health issues:    Pt's sister needs a kidney and is here for a screening, and labs as requested by the transplant clinic.  She has no other concerns.          Problem list and histories reviewed & adjusted, as indicated.  Additional history: as documented    Patient Active Problem List   Diagnosis     Major depressive disorder, recurrent, moderate (H)     Generalized anxiety disorder     Trauma and stressor-related disorder     Transplant donor evaluation     Encounter for donation of kidney     Past Surgical History:   Procedure Laterality Date     LAPAROSCOPIC DONOR HAND ASSISTED KIDNEY LIVING RELATED Right 4/16/2019    Procedure: Laparoscopic Hand Assisted Living Related Kidney Donor;  Surgeon: Cali Pérez MD;  Location: [facility] OR     NO HISTORY OF SURGERY         Social History     Tobacco Use     Smoking status: Never Smoker     Smokeless tobacco: Never Used   Substance Use Topics     Alcohol use: Yes     Comment: 3/week     Family History   Problem Relation Age of Onset     Pulmonary Embolism Mother      Depression Mother      Anxiety Disorder Mother      Hypertension Mother      Family History Negative Father      Chronic Kidney Disease Sister         anatomical      Family History Negative Brother      Glaucoma No family hx of      Macular Degeneration No family hx of          Current Outpatient Medications   Medication Sig Dispense Refill     acetaminophen (TYLENOL) 325 MG tablet Take 3 tablets (975 mg) by mouth every 8 hours 100 tablet 0     hydrocortisone (CORTAID) 1 % external cream Apply topically 2 times daily (Patient not taking: Reported on 5/6/2019) 1.5 g 0     HYDROmorphone (DILAUDID) 2 MG tablet Take 1 tablet (2 mg) by mouth every 3 hours as needed for moderate to severe pain (Patient not taking: Reported on 5/6/2019) 10 tablet 0     magnesium hydroxide (MILK OF MAGNESIA) 400 MG/5ML  Baseline in the 130-140s range, presented with a value of 127 along with concurrent hypochloremia  In the setting of sepsis thought secondary to hypovolemia, but patient appears volume overloaded with sodium unchanged despite IV fluids  Current levels improving with diuresis and normalized today to 140  Low-sodium is very likely due to hypervolemic hyponatremia  Hyponatremia has resolved  Continue to monitor  suspension Take 30 mLs by mouth daily as needed for constipation (Patient not taking: Reported on 5/6/2019) 118 mL 1     pramoxine (PRAX) 1 % LOTN lotion Apply topically every 6 hours as needed for itching (Apply to rash, not over incision. Try 2nd after hydrocortisone) (Patient not taking: Reported on 5/6/2019) 1 Bottle 0     senna-docusate (SENOKOT-S/PERICOLACE) 8.6-50 MG tablet Take 1 tablet by mouth 2 times daily (Patient taking differently: Take 1 tablet by mouth daily ) 100 tablet 1     Allergies   Allergen Reactions     Seasonal Allergies        Reviewed and updated as needed this visit by clinical staff  Tobacco  Allergies  Meds  Med Hx  Surg Hx  Fam Hx  Soc Hx      Reviewed and updated as needed this visit by Provider         Assessment & Plan     1. Transplant donor evaluation    - ABO type  - Creatinine  - Hemoglobin  - Glucose  - Albumin Random Urine Quantitative with Creat Ratio  - Protein  random urine with Creat Ratio  - UA with Microscopic           No follow-ups on file.    Quyen Faye MD  [facility]

## 2022-06-25 ENCOUNTER — HEALTH MAINTENANCE LETTER (OUTPATIENT)
Age: 47
End: 2022-06-25

## 2022-08-20 ENCOUNTER — HEALTH MAINTENANCE LETTER (OUTPATIENT)
Age: 47
End: 2022-08-20

## 2022-10-13 ENCOUNTER — IMMUNIZATION (OUTPATIENT)
Dept: FAMILY MEDICINE | Facility: CLINIC | Age: 47
End: 2022-10-13
Payer: COMMERCIAL

## 2022-10-13 DIAGNOSIS — Z23 ENCOUNTER FOR IMMUNIZATION: Primary | ICD-10-CM

## 2022-10-13 PROCEDURE — 91313 COVID-19,PF,MODERNA BIVALENT: CPT

## 2022-10-13 PROCEDURE — 99207 PR NO CHARGE NURSE ONLY: CPT

## 2022-10-13 PROCEDURE — 0134A COVID-19,PF,MODERNA BIVALENT: CPT

## 2022-11-20 ENCOUNTER — HEALTH MAINTENANCE LETTER (OUTPATIENT)
Age: 47
End: 2022-11-20

## 2023-01-04 ENCOUNTER — OFFICE VISIT (OUTPATIENT)
Dept: FAMILY MEDICINE | Facility: CLINIC | Age: 48
End: 2023-01-04
Payer: COMMERCIAL

## 2023-01-04 ENCOUNTER — APPOINTMENT (OUTPATIENT)
Dept: LAB | Facility: CLINIC | Age: 48
End: 2023-01-04
Payer: COMMERCIAL

## 2023-01-04 VITALS
TEMPERATURE: 98.4 F | HEIGHT: 67 IN | DIASTOLIC BLOOD PRESSURE: 62 MMHG | HEART RATE: 63 BPM | SYSTOLIC BLOOD PRESSURE: 98 MMHG | WEIGHT: 132 LBS | RESPIRATION RATE: 17 BRPM | BODY MASS INDEX: 20.72 KG/M2 | OXYGEN SATURATION: 99 %

## 2023-01-04 DIAGNOSIS — Z00.00 ROUTINE HISTORY AND PHYSICAL EXAMINATION OF ADULT: Primary | ICD-10-CM

## 2023-01-04 DIAGNOSIS — F33.1 MAJOR DEPRESSIVE DISORDER, RECURRENT, MODERATE (H): ICD-10-CM

## 2023-01-04 DIAGNOSIS — Z12.31 ENCOUNTER FOR SCREENING MAMMOGRAM FOR BREAST CANCER: ICD-10-CM

## 2023-01-04 DIAGNOSIS — Z52.4 DONOR OF KIDNEY FOR TRANSPLANT: ICD-10-CM

## 2023-01-04 DIAGNOSIS — Z12.4 CERVICAL CANCER SCREENING: ICD-10-CM

## 2023-01-04 DIAGNOSIS — Z12.11 SCREEN FOR COLON CANCER: ICD-10-CM

## 2023-01-04 LAB
ALBUMIN UR-MCNC: NEGATIVE MG/DL
APPEARANCE UR: CLEAR
BACTERIA #/AREA URNS HPF: ABNORMAL /HPF
BILIRUB UR QL STRIP: NEGATIVE
COLOR UR AUTO: YELLOW
GLUCOSE UR STRIP-MCNC: NEGATIVE MG/DL
HGB UR QL STRIP: ABNORMAL
KETONES UR STRIP-MCNC: NEGATIVE MG/DL
LEUKOCYTE ESTERASE UR QL STRIP: NEGATIVE
NITRATE UR QL: NEGATIVE
PH UR STRIP: 6 [PH] (ref 5–7)
RBC #/AREA URNS AUTO: ABNORMAL /HPF
SP GR UR STRIP: 1.01 (ref 1–1.03)
SQUAMOUS #/AREA URNS AUTO: ABNORMAL /LPF
UROBILINOGEN UR STRIP-ACNC: 0.2 E.U./DL
WBC #/AREA URNS AUTO: ABNORMAL /HPF

## 2023-01-04 PROCEDURE — 99396 PREV VISIT EST AGE 40-64: CPT | Performed by: NURSE PRACTITIONER

## 2023-01-04 PROCEDURE — 36415 COLL VENOUS BLD VENIPUNCTURE: CPT | Performed by: NURSE PRACTITIONER

## 2023-01-04 PROCEDURE — 96127 BRIEF EMOTIONAL/BEHAV ASSMT: CPT | Performed by: NURSE PRACTITIONER

## 2023-01-04 PROCEDURE — 81001 URINALYSIS AUTO W/SCOPE: CPT | Performed by: NURSE PRACTITIONER

## 2023-01-04 PROCEDURE — 87624 HPV HI-RISK TYP POOLED RSLT: CPT | Performed by: NURSE PRACTITIONER

## 2023-01-04 PROCEDURE — 80053 COMPREHEN METABOLIC PANEL: CPT | Performed by: NURSE PRACTITIONER

## 2023-01-04 PROCEDURE — G0145 SCR C/V CYTO,THINLAYER,RESCR: HCPCS | Performed by: NURSE PRACTITIONER

## 2023-01-04 PROCEDURE — 99213 OFFICE O/P EST LOW 20 MIN: CPT | Mod: 25 | Performed by: NURSE PRACTITIONER

## 2023-01-04 ASSESSMENT — ENCOUNTER SYMPTOMS
ABDOMINAL PAIN: 0
MYALGIAS: 0
NERVOUS/ANXIOUS: 1
WEAKNESS: 0
SORE THROAT: 0
CONSTIPATION: 0
BREAST MASS: 0
DYSURIA: 0
CHILLS: 0
HEMATURIA: 0
HEARTBURN: 0
FEVER: 0
JOINT SWELLING: 0
NAUSEA: 0
PARESTHESIAS: 0
HEADACHES: 0
DIARRHEA: 0
PALPITATIONS: 0
SHORTNESS OF BREATH: 0
EYE PAIN: 0
COUGH: 0
HEMATOCHEZIA: 0
FREQUENCY: 0
ARTHRALGIAS: 1
DIZZINESS: 0

## 2023-01-04 ASSESSMENT — PAIN SCALES - GENERAL: PAINLEVEL: NO PAIN (0)

## 2023-01-04 NOTE — PROGRESS NOTES
SUBJECTIVE:   CC: Elly is an 47 year old who presents for preventive health visit.   {Split Bill scripting  The purpose of this visit is to discuss your medical history and prevent health problems before you are sick. You may be responsible for a co-pay, coinsurance, or deductible if your visit today includes services such as checking on a sore throat, having an x-ray or lab test, or treating and evaluating a new or existing condition :381340}  Patient has been advised of split billing requirements and indicates understanding: Yes  Healthy Habits:     Getting at least 3 servings of Calcium per day:  Yes    Bi-annual eye exam:  NO    Dental care twice a year:  Yes    Sleep apnea or symptoms of sleep apnea:  None    Diet:  Regular (no restrictions)    Frequency of exercise:  1 day/week    Duration of exercise:  Less than 15 minutes    Taking medications regularly:  Yes    Medication side effects:  Not applicable    PHQ-2 Total Score: 2    Additional concerns today:  Yes    {Add if <65 person on Medicare  - Required Questions (Optional):718418}  {Outside tests to abstract? :270389}    {additional problems to add (Optional):682307}    Today's PHQ-2 Score:   PHQ-2 ( 1999 Pfizer) 1/4/2023   Q1: Little interest or pleasure in doing things 1   Q2: Feeling down, depressed or hopeless 1   PHQ-2 Score 2   PHQ-2 Total Score (12-17 Years)- Positive if 3 or more points; Administer PHQ-A if positive -   Q1: Little interest or pleasure in doing things Several days   Q2: Feeling down, depressed or hopeless Several days   PHQ-2 Score 2           Social History     Tobacco Use     Smoking status: Never     Smokeless tobacco: Never   Substance Use Topics     Alcohol use: Yes     Comment: 3/week     {Rooming Staff- Complete this question if Prescreen response is not shown below for today's visit. If you drink alcohol do you typically have >3 drinks per day or >7 drinks per week? (Optional):467305}    Alcohol Use 1/4/2023  "  Prescreen: >3 drinks/day or >7 drinks/week? No   Prescreen: >3 drinks/day or >7 drinks/week? -   {add AUDIT responses (Optional) (A score of 7 for adult men is an indication of hazardous drinking; a score of 8 or more is an indication of an alcohol use disorder.  A score of 7 or more for adult women is an indication of hazardous drinking or an alchohol use disorder):155854}    Reviewed orders with patient.  Reviewed health maintenance and updated orders accordingly - { :580312::\"Yes\"}  {Chronicprobdata (optional):788112}    Breast Cancer Screening:    Breast CA Risk Assessment (FHS-7) 4/6/2021   Do you have a family history of breast, colon, or ovarian cancer? No / Unknown       {If any of the questions to the BCRA (FHS-7) are answered yes, consider ordering referral for genetic counseling (Optional) :379779::\"click delete button to remove this line now\"}  {AMB Mammogram Decision Support (Optional) :278525}  Pertinent mammograms are reviewed under the imaging tab.    History of abnormal Pap smear: { :022241}  PAP / HPV Latest Ref Rng & Units 5/4/2018   PAP (Historical) - NIL   HPV16 NEG:Negative Negative   HPV18 NEG:Negative Negative   HRHPV NEG:Negative Negative     Reviewed and updated as needed this visit by clinical staff                  Reviewed and updated as needed this visit by Provider                 {HISTORY OPTIONS (Optional):097817}    Review of Systems   Constitutional: Negative for chills and fever.   HENT: Negative for congestion, ear pain, hearing loss and sore throat.    Eyes: Negative for pain and visual disturbance.   Respiratory: Negative for cough and shortness of breath.    Cardiovascular: Negative for chest pain, palpitations and peripheral edema.   Gastrointestinal: Negative for abdominal pain, constipation, diarrhea, heartburn, hematochezia and nausea.   Breasts:  Negative for tenderness, breast mass and discharge.   Genitourinary: Negative for dysuria, frequency, genital sores, " "hematuria, pelvic pain, urgency, vaginal bleeding and vaginal discharge.   Musculoskeletal: Positive for arthralgias. Negative for joint swelling and myalgias.   Skin: Negative for rash.   Neurological: Negative for dizziness, weakness, headaches and paresthesias.   Psychiatric/Behavioral: Negative for mood changes. The patient is nervous/anxious.      {FEMALE ROS (Optional):043462}     OBJECTIVE:   There were no vitals taken for this visit.  Physical Exam  {Exam Choices (Optional):402140}    {Diagnostic Test Results (Optional):520827::\"Diagnostic Test Results:\",\"Labs reviewed in Epic\"}    ASSESSMENT/PLAN:   {Diag Picklist:932990}    {Patient advised of split billing (Optional):141667}      COUNSELING:  {FEMALE COUNSELING MESSAGES:094608::\"Reviewed preventive health counseling, as reflected in patient instructions\"}        She reports that she has never smoked. She has never used smokeless tobacco.      {Counseling Resources  [place] Preventive Services Task Force  Cholesterol Screening  Health diet/nutrition  Pooled Cohorts Equation Calculator  USDA's MyPlate  ASA Prophylaxis  Lung CA Screening  Osteoporosis prevention/bone health :280016}  {Breast Cancer Risk Calculator  BRCA-Related Cancer Risk Assessment FHS-7 Tool :304961}  HERMINIO Bhatt [facility]  "

## 2023-01-04 NOTE — PROGRESS NOTES
SUBJECTIVE:   CC: Elly is an 47 year old who presents for preventive health visit.   Patient has been advised of split billing requirements and indicates understanding: Yes  Healthy Habits:     Getting at least 3 servings of Calcium per day:  Yes    Bi-annual eye exam:  NO    Dental care twice a year:  Yes    Sleep apnea or symptoms of sleep apnea:  None    Diet:  Regular (no restrictions)    Frequency of exercise:  1 day/week    Duration of exercise:  Less than 15 minutes    Taking medications regularly:  Yes    Medication side effects:  Not applicable    PHQ-2 Total Score: 2    Additional concerns today:  Yes    Overall health is stable. She has chronic ongoing stress due to having two young adult children with chronic health issues. She was seeing a counselor previously, and thinks it may be helpful to check in with one again, but does not have acute concerns with her mood. Continues to sleep well; no trouble with eating; weight stable.  Today's PHQ-2 Score:   PHQ-2 ( 1999 Pfizer) 1/4/2023   Q1: Little interest or pleasure in doing things 1   Q2: Feeling down, depressed or hopeless 1   PHQ-2 Score 2   PHQ-2 Total Score (12-17 Years)- Positive if 3 or more points; Administer PHQ-A if positive -   Q1: Little interest or pleasure in doing things Several days   Q2: Feeling down, depressed or hopeless Several days   PHQ-2 Score 2     Answers for HPI/ROS submitted by the patient on 1/4/2023  If you checked off any problems, how difficult have these problems made it for you to do your work, take care of things at home, or get along with other people?: Somewhat difficult  PHQ9 TOTAL SCORE: 4      Social History     Tobacco Use     Smoking status: Never     Smokeless tobacco: Never   Substance Use Topics     Alcohol use: Yes     Comment: 3/week     If you drink alcohol do you typically have >3 drinks per day or >7 drinks per week? No    Alcohol Use 1/4/2023   Prescreen: >3 drinks/day or >7 drinks/week? No    Prescreen: >3 drinks/day or >7 drinks/week? -   No flowsheet data found.    Reviewed orders with patient.  Reviewed health maintenance and updated orders accordingly - Yes  Lab work is in process  Labs reviewed in EPIC    Breast Cancer Screening:    Breast CA Risk Assessment (FHS-7) 4/6/2021   Do you have a family history of breast, colon, or ovarian cancer? No / Unknown       click delete button to remove this line now    Pertinent mammograms are reviewed under the imaging tab.    History of abnormal Pap smear: NO - age 30-65 PAP every 5 years with negative HPV co-testing recommended  PAP / HPV Latest Ref Rng & Units 5/4/2018   PAP (Historical) - NIL   HPV16 NEG:Negative Negative   HPV18 NEG:Negative Negative   HRHPV NEG:Negative Negative     Reviewed and updated as needed this visit by clinical staff                  Reviewed and updated as needed this visit by Provider                     Review of Systems   Constitutional: Negative for chills and fever.   HENT: Negative for congestion, ear pain, hearing loss and sore throat.    Eyes: Negative for pain and visual disturbance.   Respiratory: Negative for cough and shortness of breath.    Cardiovascular: Negative for chest pain, palpitations and peripheral edema.   Gastrointestinal: Negative for abdominal pain, constipation, diarrhea, heartburn, hematochezia and nausea.   Breasts:  Negative for tenderness, breast mass and discharge.   Genitourinary: Negative for dysuria, frequency, genital sores, hematuria, pelvic pain, urgency, vaginal bleeding and vaginal discharge.   Musculoskeletal: Positive for arthralgias. Negative for joint swelling and myalgias.   Skin: Negative for rash.   Neurological: Negative for dizziness, weakness, headaches and paresthesias.   Psychiatric/Behavioral: Negative for mood changes. The patient is nervous/anxious.           OBJECTIVE:   There were no vitals taken for this visit.  Physical Exam  GENERAL: healthy, alert and no  distress  EYES: Eyes grossly normal to inspection, PERRL and conjunctivae and sclerae normal  NECK: no adenopathy, no asymmetry, masses, or scars and thyroid normal to palpation  RESP: lungs clear to auscultation - no rales, rhonchi or wheezes  BREAST: normal without masses, tenderness or nipple discharge and no palpable axillary masses or adenopathy  CV: regular rate and rhythm, normal S1 S2, no S3 or S4, no murmur, click or rub, no peripheral edema and peripheral pulses strong  ABDOMEN: soft, nontender, no hepatosplenomegaly, no masses and bowel sounds normal   (female): normal female external genitalia, normal urethral meatus, vaginal mucosa, normal cervix/adnexa/uterus without masses or discharge  MS: no gross musculoskeletal defects noted, no edema  SKIN: no suspicious lesions or rashes  NEURO: Normal strength and tone, mentation intact and speech normal  PSYCH: mentation appears normal, affect normal/bright    Diagnostic Test Results:  Labs reviewed in Epic  No results found for this or any previous visit (from the past 24 hour(s)).    ASSESSMENT/PLAN:       ICD-10-CM    1. Routine history and physical examination of adult  Z00.00 Adult Mental Health  Referral      2. Screen for colon cancer  Z12.11 COLOGUARD(EXACT SCIENCES)      3. Encounter for screening mammogram for breast cancer  Z12.31 MA SCREENING DIGITAL BILAT - Future  (s+30)      4. Cervical cancer screening  Z12.4 Pap Screen with HPV - recommended age 30 - 65 years      5. Major depressive disorder, recurrent, moderate (H)  F33.1       6. Donor of kidney for transplant  Z52.4 UA with Microscopic - lab collect     Comprehensive metabolic panel (BMP + Alb, Alk Phos, ALT, AST, Total. Bili, TP)     UA with Microscopic - lab collect     Comprehensive metabolic panel (BMP + Alb, Alk Phos, ALT, AST, Total. Bili, TP)              COUNSELING:  Reviewed preventive health counseling, as reflected in patient instructions       Regular exercise        Healthy diet/nutrition        She reports that she has never smoked. She has never used smokeless tobacco.      HERMINIO Bhatt CNP [facility]

## 2023-01-05 LAB
ALBUMIN SERPL BCG-MCNC: 4.4 G/DL (ref 3.5–5.2)
ALP SERPL-CCNC: 45 U/L (ref 35–104)
ALT SERPL W P-5'-P-CCNC: 12 U/L (ref 10–35)
ANION GAP SERPL CALCULATED.3IONS-SCNC: 12 MMOL/L (ref 7–15)
AST SERPL W P-5'-P-CCNC: 20 U/L (ref 10–35)
BILIRUB SERPL-MCNC: 0.2 MG/DL
BUN SERPL-MCNC: 7.8 MG/DL (ref 6–20)
CALCIUM SERPL-MCNC: 8.6 MG/DL (ref 8.6–10)
CHLORIDE SERPL-SCNC: 104 MMOL/L (ref 98–107)
CREAT SERPL-MCNC: 0.9 MG/DL (ref 0.51–0.95)
DEPRECATED HCO3 PLAS-SCNC: 22 MMOL/L (ref 22–29)
GFR SERPL CREATININE-BSD FRML MDRD: 79 ML/MIN/1.73M2
GLUCOSE SERPL-MCNC: 89 MG/DL (ref 70–99)
POTASSIUM SERPL-SCNC: 4.2 MMOL/L (ref 3.4–5.3)
PROT SERPL-MCNC: 6.6 G/DL (ref 6.4–8.3)
SODIUM SERPL-SCNC: 138 MMOL/L (ref 136–145)

## 2023-01-09 LAB
BKR LAB AP GYN ADEQUACY: NORMAL
BKR LAB AP GYN INTERPRETATION: NORMAL
BKR LAB AP HPV REFLEX: NORMAL
BKR LAB AP PREVIOUS ABNORMAL: NORMAL
PATH REPORT.COMMENTS IMP SPEC: NORMAL
PATH REPORT.COMMENTS IMP SPEC: NORMAL
PATH REPORT.RELEVANT HX SPEC: NORMAL

## 2023-01-10 LAB
HUMAN PAPILLOMA VIRUS 16 DNA: NEGATIVE
HUMAN PAPILLOMA VIRUS 18 DNA: NEGATIVE
HUMAN PAPILLOMA VIRUS FINAL DIAGNOSIS: NORMAL
HUMAN PAPILLOMA VIRUS OTHER HR: NEGATIVE

## 2023-01-20 LAB — NONINV COLON CA DNA+OCC BLD SCRN STL QL: NEGATIVE

## 2023-01-25 ENCOUNTER — ANCILLARY PROCEDURE (OUTPATIENT)
Dept: MAMMOGRAPHY | Facility: CLINIC | Age: 48
End: 2023-01-25
Attending: NURSE PRACTITIONER
Payer: COMMERCIAL

## 2023-01-25 DIAGNOSIS — Z12.31 ENCOUNTER FOR SCREENING MAMMOGRAM FOR BREAST CANCER: ICD-10-CM

## 2023-01-25 PROCEDURE — 77063 BREAST TOMOSYNTHESIS BI: CPT | Mod: GC | Performed by: STUDENT IN AN ORGANIZED HEALTH CARE EDUCATION/TRAINING PROGRAM

## 2023-01-25 PROCEDURE — 77067 SCR MAMMO BI INCL CAD: CPT | Mod: GC | Performed by: STUDENT IN AN ORGANIZED HEALTH CARE EDUCATION/TRAINING PROGRAM

## 2023-01-26 NOTE — RESULT ENCOUNTER NOTE
Nika Sanabria is out of the clinic this week and I am reviewing the lab results she ordered.  Your cologuard test was normal which means there was no colon cancer detected.  If you have any questions, please feel free to contact the clinic.    MIKA Gonzalez

## 2023-05-19 NOTE — NURSING NOTE
Homer Sanabria in clinic [facility] kidney donor evaluation.    Came with  on 19  Has offered to be donor to sister  Discussed surgery hospitalization and recovery.  Know when and how to obtain evaluation results and the process for scheduling surgery.  Reviewed SRTR datasheet.  Donor was Provided Living Donor Collective (LDC) Materials? Yes  Donor has agreed to be contacted by [facility] for follow-up LDC Questions? Yes  Signed consent for donor evaluation.  Donor Signed TATIANNA.  Requested routine cancer screening tests:    Needs mammogram  Questions answered.  Approx. time spent:  30 minutes  Donor has medical insurance:  Yes      Living Kidney Donor Consent per OPTN Policy 14.3 for Transplant Coordinators    Written assurance has been obtained from the patient that the donor:   1) Is willing to donate  2) Is free from inducement and coercion  3) Has been informed that the donor may decline to donate at any time  4) Has been informed that transplant centers must:     A) Offer donors an opportunity to discontinue the donor consent or evaluation process in a way that is protected and confidential    B) Provide an independent donor advocate (TIFFANIE) to assist the potential donor during this process    The following was presented in a language in which donor is able to engage in meaningful dialogue and was reviewed and discussed with the patient by the transplant coordinator and the physician.     The following has been provided:   Education and instruction about all phases of the living donation process includin) Consent  2) The donor must undergo medical and psychosocial evaluations  3) Disclosure that the recovery hospital will take all reasonable precautions to provide confidentiality for the donor/recipient  4) Disclosure that it is a federal crime for any person to knowingly acquire, obtain or otherwise transfer any human organ for valuable consideration  5) The transplant hospital may refuse the potential donor,  and the donor could be evaluated by another transplant program with different selection criteria  6) Data from the most recent SRTR center-specific reports:     A) National 1-year patient and graft survival rates    B) Hospital s 1-year patient and graft survival rates    C) Notification about all CMS outcome requirements not being met by the recovery and recipient s hospitals    The following has been provided:   1) Education about expected post-donation kidney function and how chronic kidney disease and end-stage renal disease might potentially impact the donor in the future to include:    A) On average, donors will have 25-35% permanent loss of kidney function at donation    B) Baseline risk of End Stage Renal Disease (ESRD)  does not exceed that of members of general population with same demographic profile    C) Donor risks must be interpreted in light of known epidemiology of both Chronic Kidney Disease (CKD) or ESRD    D) CKD generally develops in midlife (40-50 years old)    E) ESRD generally develops after age 60    F) Medical evaluation of young potential donor cannot predict lifetime risk  2) Donors may be at higher risk for CKD if they sustain damage to the remaining kidney. 3) Development of CKD and progression to ESRD may be more rapid with only 1 kidney  4) Dialysis is required when reaching ESRD  5) Current practice prioritizes prior living kidney donors who became kidney transplant candidates  6) Alternate procedures or courses of treatment for the recipient, including  donor transplant    A) A  donor kidney may become available for the recipient before donor evaluation is complete or transplant occurs    B) Any transplant candidate may have risk factors for increased morbidity or mortality that are not disclosed to the potential donor  7) The patient will receive a thorough medical and psychosocial evaluation  8) Health information obtained during the evaluation is subject to the  same regulations as all records and could reveal conditions that must be reported to local, state, or federal public health authorities  9) The [facility] [place], is required to report living donor follow up information at 6, 12, and 24 months  10) Potential donors must commit to post operative follow up testing coordinated by the [facility]  11) Any infectious disease or malignancy pertinent to acute recipient care discovered during the potential donor s first two years of follow up care:    A) Will be disclosed to the donor    B) May need to be reported to local, state or federal public health authorities    C) Will be disclosed to their recipient s transplant center, and    D) Will be reported through the [facility] Improving Patient Safety Portal    Disclosure has been provided that these risks may be transient or permanent & include but are not limited to potential medical or surgical risks:    Death    Scars, pain, fatigue, and other consequences typical of any surgical procedure    Decreased kidney function    Abdominal or bowel symptoms such as bloating and nausea, and developing bowel obstruction    Kidney failure and the need for dialysis or kidney transplant for the donor  Impact of obesity, hypertension or other donor-specific medical conditions on morbidity and mortality of the potential donor    KARIE Valdivia, RN     Transplant Coordinator  [phone]  [phone]   done

## 2023-10-07 NOTE — OR NURSING
10/07/23 0840   Evaluation Type   Evaluation Type Inpatient   Problems identified   Problems identified Knowledge deficit   Problems Identified Other hx Fe deficiency anemia   Maternal history   Maternal history Depression; Anxiety   Other/comment Infant completed feeding just prior to lc arrival   Breastfeeding goal   Breastfeeding goal To maintain breast milk feeding per patient goal   Maternal Assessment   Prior breastfeeding experience (comment below) Multip;Pumped & bottle fed;Problems, continued   Prior BF experience: comment hx low milk supply, difficulty when RTW, not returning to work followng with this baby   Breastfeeding Assistance 1923 [facility] assistance declined at this time   Pain assessment   Location/Comment denied   Treatment of Sore Nipples Expressed breast milk   Guidelines for use of:   Breast pump type   (Has medela symphony at hospital with her, pumps following most feedings at breast)   Suggested use of pump Pump if infant is not latching to breast;For comfort as needed;Pump each time a supplement is offered   Other (comment) Reviewed pump use/recommendations, size flange assessment ~ 24mm vs 30 mm currently in use Reported K+ of 3.2 to MDA

## 2023-10-20 ENCOUNTER — OFFICE VISIT (OUTPATIENT)
Dept: FAMILY MEDICINE | Facility: CLINIC | Age: 48
End: 2023-10-20
Payer: COMMERCIAL

## 2023-10-20 ENCOUNTER — MYC MEDICAL ADVICE (OUTPATIENT)
Dept: FAMILY MEDICINE | Facility: CLINIC | Age: 48
End: 2023-10-20

## 2023-10-20 VITALS
OXYGEN SATURATION: 99 % | WEIGHT: 130.4 LBS | TEMPERATURE: 98.3 F | SYSTOLIC BLOOD PRESSURE: 93 MMHG | RESPIRATION RATE: 18 BRPM | DIASTOLIC BLOOD PRESSURE: 59 MMHG | HEART RATE: 62 BPM | BODY MASS INDEX: 20.47 KG/M2
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
## 2023-10-20 DIAGNOSIS — S20.461A TICK BITE OF RIGHT BACK WALL OF THORAX, INITIAL ENCOUNTER: Primary | ICD-10-CM

## 2023-10-20 PROCEDURE — 99213 OFFICE O/P EST LOW 20 MIN: CPT | Performed by: PHYSICIAN ASSISTANT

## 2023-10-20 NOTE — PROGRESS NOTES
Assessment & Plan     Tick bite of right back wall of thorax, initial encounter  Patient has had a deer tick bite which meets criteria for prophylaxis including being in place approximately 48 hours and engorged.  She has already been given a prescription for doxycycline but discussed with her there is no indication for longer treatment than a single 200 mg dose of doxycycline at this time.  She is agreeable to this and prefers to be off antibiotics in this indicated thus will have her do the prophylactic dose as indicated only.  Recommend continued observation of the area.  I discussed there is not a need to remove the mandible which remains in the lesion however she would very let much like me to try to see if it can easily come out.  Using a fine forceps/splinter removal forceps and an 18-gauge needle was able to remove the mandible with minimal surrounding tissue damage.  She should watch the area for signs of secondary infection keep the area clean and dry and will continue to observe for typical signs and symptoms of erythema migrans or systemic symptoms Lyme disease.  Discussed ticks there is no indication for laboratory testing of the tick itself.           Jenna Frazier PA-C  [facility] is a 48 year old female who presents to clinic today for the following health issues:  Chief Complaint   Patient presents with    Insect Bites     Last night deer tick bit on right side of shoulder      HPI  Patient presents to urgent care with concerns regarding a deer tick bite.  She noticed the tick last night on her right posterior shoulder.  She removed the body but is certain the mandible remains in place.  She was hiking 2 days prior thus believes the tick was in place at least 48 hours.  She denies any fevers or rash.  She was seen today in an occupational medicine clinic and was given a prescription for doxycycline with recommendation to take 5 to 21  days.  Tender to the touch.            Review of Systems  Constitutional, HEENT, cardiovascular, pulmonary, gi and gu systems are negative, except as otherwise noted.      Objective    BP 93/59 (BP Location: Right arm, Patient Position: Sitting, Cuff Size: Adult Regular)   Pulse 62   Temp 98.3  F (36.8  C) (Oral)   Resp 18   Wt 59.1 kg (130 lb 6.4 oz)   SpO2 99%   BMI 20.47 kg/m    Physical Exam   Examination of the right posterior trunk/scapular stanley reveals a erythematous macule approximately 5 mm in size with central black debris consistent with mandible of the tick.  This was removed with some difficulty using a forceps and 18-gauge needle with minimal surrounding tissue damage.  No active bleeding.  No rash.

## 2023-10-20 NOTE — PATIENT INSTRUCTIONS
Take doxycycline 200 mg once.      Watch for rash of lyme disease, unexplained fevers, etc, happy to see you back any time.

## 2023-12-05 ENCOUNTER — PATIENT OUTREACH (OUTPATIENT)
Dept: CARE COORDINATION | Facility: CLINIC | Age: 48
End: 2023-12-05
Payer: COMMERCIAL

## 2023-12-12 NOTE — MR AVS SNAPSHOT
MRN:[id number]                      After Visit Summary   8/15/2018    Elly Ballard    MRN: [id number]           Visit Information        Provider Department      8/15/2018 11:00 AM Hitesh Wayne [facility] Generic      Your next 10 appointments already scheduled     Aug 28, 2018 10:00 AM CDT   Return Visit with Wayne [facility]    [facility]  [address]  [facility] [id number] [phone]            Sep 13, 2018 10:00 AM CDT   Return Visit with Wayne Proctor [facility])    [facility]  [address]  [facility] [id number] [phone]              MyChart Information     [employer] gives you secure access to your electronic health record. If you see a primary care provider, you can also send messages to your care team and make appointments. If you have questions, please call your primary care clinic.  If you do not have a primary care provider, please call [phone] and they will assist you.        Care EveryWhere ID     This is your Care EveryWhere ID. This could be used by other organizations to access your [place] medical records  [id number]        Equal Access to Services     VIC COTTRELL : Sarah lestero Sobetsy, waaxda luqadaha, qaybta kaalmada adeegyada, marino sow. So [facility] [phone].    ATENCIÓN: Si habla español, tiene a funk disposición servicios gratuitos de asistencia lingüística. Llkan al [phone].    We comply with applicable federal civil rights laws and [place] laws. We do not discriminate on the basis of race, color, national origin, age, disability, sex, sexual orientation, or gender identity.            
- - -

## 2023-12-19 ENCOUNTER — PATIENT OUTREACH (OUTPATIENT)
Dept: CARE COORDINATION | Facility: CLINIC | Age: 48
End: 2023-12-19
Payer: COMMERCIAL

## 2023-12-26 ENCOUNTER — PATIENT OUTREACH (OUTPATIENT)
Dept: CARE COORDINATION | Facility: CLINIC | Age: 48
End: 2023-12-26
Payer: COMMERCIAL

## 2024-01-23 ENCOUNTER — PATIENT OUTREACH (OUTPATIENT)
Dept: CARE COORDINATION | Facility: CLINIC | Age: 49
End: 2024-01-23
Payer: COMMERCIAL

## 2024-01-29 ENCOUNTER — OFFICE VISIT (OUTPATIENT)
Dept: FAMILY MEDICINE | Facility: CLINIC | Age: 49
End: 2024-01-29
Payer: COMMERCIAL

## 2024-01-29 VITALS
BODY MASS INDEX: 20.65 KG/M2 | TEMPERATURE: 98.2 F | HEIGHT: 67 IN | SYSTOLIC BLOOD PRESSURE: 107 MMHG | DIASTOLIC BLOOD PRESSURE: 70 MMHG | RESPIRATION RATE: 20 BRPM | HEART RATE: 74 BPM | OXYGEN SATURATION: 98 % | WEIGHT: 131.6 LBS

## 2024-01-29 DIAGNOSIS — Z13.29 SCREENING FOR THYROID DISORDER: ICD-10-CM

## 2024-01-29 DIAGNOSIS — Z13.1 SCREENING FOR DIABETES MELLITUS: ICD-10-CM

## 2024-01-29 DIAGNOSIS — Z13.21 ENCOUNTER FOR VITAMIN DEFICIENCY SCREENING: ICD-10-CM

## 2024-01-29 DIAGNOSIS — Z23 NEED FOR VACCINATION: ICD-10-CM

## 2024-01-29 DIAGNOSIS — Z12.31 VISIT FOR SCREENING MAMMOGRAM: ICD-10-CM

## 2024-01-29 DIAGNOSIS — Z94.0 HISTORY OF LIVING-DONOR KIDNEY TRANSPLANTATION: ICD-10-CM

## 2024-01-29 DIAGNOSIS — Z00.00 ROUTINE GENERAL MEDICAL EXAMINATION AT A HEALTH CARE FACILITY: Primary | ICD-10-CM

## 2024-01-29 LAB
ANION GAP SERPL CALCULATED.3IONS-SCNC: 7 MMOL/L (ref 7–15)
BUN SERPL-MCNC: 10.1 MG/DL (ref 6–20)
CALCIUM SERPL-MCNC: 9.1 MG/DL (ref 8.6–10)
CHLORIDE SERPL-SCNC: 102 MMOL/L (ref 98–107)
CREAT SERPL-MCNC: 0.89 MG/DL (ref 0.51–0.95)
DEPRECATED HCO3 PLAS-SCNC: 27 MMOL/L (ref 22–29)
EGFRCR SERPLBLD CKD-EPI 2021: 80 ML/MIN/1.73M2
GLUCOSE SERPL-MCNC: 76 MG/DL (ref 70–99)
POTASSIUM SERPL-SCNC: 4.4 MMOL/L (ref 3.4–5.3)
SODIUM SERPL-SCNC: 136 MMOL/L (ref 135–145)
T4 FREE SERPL-MCNC: 1.26 NG/DL (ref 0.9–1.7)
TSH SERPL DL<=0.005 MIU/L-ACNC: 5.08 UIU/ML (ref 0.3–4.2)
VIT D+METAB SERPL-MCNC: 26 NG/ML (ref 20–50)

## 2024-01-29 PROCEDURE — 90471 IMMUNIZATION ADMIN: CPT | Performed by: NURSE PRACTITIONER

## 2024-01-29 PROCEDURE — 99396 PREV VISIT EST AGE 40-64: CPT | Mod: 25 | Performed by: NURSE PRACTITIONER

## 2024-01-29 PROCEDURE — 84443 ASSAY THYROID STIM HORMONE: CPT | Performed by: NURSE PRACTITIONER

## 2024-01-29 PROCEDURE — 84439 ASSAY OF FREE THYROXINE: CPT | Performed by: NURSE PRACTITIONER

## 2024-01-29 PROCEDURE — 82306 VITAMIN D 25 HYDROXY: CPT | Performed by: NURSE PRACTITIONER

## 2024-01-29 PROCEDURE — 90715 TDAP VACCINE 7 YRS/> IM: CPT | Performed by: NURSE PRACTITIONER

## 2024-01-29 PROCEDURE — 80048 BASIC METABOLIC PNL TOTAL CA: CPT | Performed by: NURSE PRACTITIONER

## 2024-01-29 PROCEDURE — 36415 COLL VENOUS BLD VENIPUNCTURE: CPT | Performed by: NURSE PRACTITIONER

## 2024-01-29 ASSESSMENT — ENCOUNTER SYMPTOMS
PALPITATIONS: 0
CONSTIPATION: 0
HEARTBURN: 0
DIARRHEA: 0
FEVER: 0
SORE THROAT: 0
JOINT SWELLING: 0
NAUSEA: 0
DYSURIA: 0
WEAKNESS: 0
SHORTNESS OF BREATH: 0
HEADACHES: 0
DIZZINESS: 0
HEMATURIA: 0
COUGH: 0
HEMATOCHEZIA: 0
ARTHRALGIAS: 0
FREQUENCY: 0
CHILLS: 0
EYE PAIN: 0
BREAST MASS: 0
ABDOMINAL PAIN: 0
MYALGIAS: 0
PARESTHESIAS: 0
NERVOUS/ANXIOUS: 0

## 2024-01-29 ASSESSMENT — PAIN SCALES - GENERAL: PAINLEVEL: NO PAIN (0)

## 2024-01-29 ASSESSMENT — PATIENT HEALTH QUESTIONNAIRE - PHQ9
SUM OF ALL RESPONSES TO PHQ QUESTIONS 1-9: 3
10. IF YOU CHECKED OFF ANY PROBLEMS, HOW DIFFICULT HAVE THESE PROBLEMS MADE IT FOR YOU TO DO YOUR WORK, TAKE CARE OF THINGS AT HOME, OR GET ALONG WITH OTHER PEOPLE: SOMEWHAT DIFFICULT
SUM OF ALL RESPONSES TO PHQ QUESTIONS 1-9: 3

## 2024-01-29 NOTE — NURSING NOTE
Prior to immunization administration, verified patients identity using patient s name and date of birth. Please see Immunization Activity for additional information.     Screening Questionnaire for Adult Immunization    Are you sick today?   No   Do you have allergies to medications, food, a vaccine component or latex?   No   Have you ever had a serious reaction after receiving a vaccination?   No   Do you have a long-term health problem with heart, lung, kidney, or metabolic disease (e.g., diabetes), asthma, a blood disorder, no spleen, complement component deficiency, a cochlear implant, or a spinal fluid leak?  Are you on long-term aspirin therapy?   No   Do you have cancer, leukemia, HIV/AIDS, or any other immune system problem?   No   Do you have a parent, brother, or sister with an immune system problem?   No   In the past 3 months, have you taken medications that affect  your immune system, such as prednisone, other steroids, or anticancer drugs; drugs for the treatment of rheumatoid arthritis, Crohn s disease, or psoriasis; or have you had radiation treatments?   No   Have you had a seizure, or a brain or other nervous system problem?   No   During the past year, have you received a transfusion of blood or blood    products, or been given immune (gamma) globulin or antiviral drug?   No   For women: Are you pregnant or is there a chance you could become       pregnant during the next month?   No   Have you received any vaccinations in the past 4 weeks?   No     Immunization questionnaire answers were all negative.      Patient instructed to remain in clinic for 15 minutes afterwards, and to report any adverse reactions.     Screening performed by Mercy Hung MA on 1/29/2024 at 9:28 AM.

## 2024-01-29 NOTE — PROGRESS NOTES
Preventive Care Visit  [facility]  Soni Charles DNP, Family Medicine  Jan 29, 2024       SUBJECTIVE:   Elly is a 48 year old, presenting for the following:  Physical (Check kidney function, wondering about bone density (sisters diagnosed with osteopenia) )        1/29/2024     9:25 AM   Additional Questions   Roomed by Mercy   Accompanied by none         1/29/2024     9:25 AM   Patient Reported Additional Medications   Patient reports taking the following new medications none     Healthy Habits:     Getting at least 3 servings of Calcium per day:  NO    Bi-annual eye exam:  NO    Dental care twice a year:  Yes    Sleep apnea or symptoms of sleep apnea:  None    Diet:  Gluten-free/reduced    Frequency of exercise:  4-5 days/week    Duration of exercise:  15-30 minutes    Taking medications regularly:  Yes    Medication side effects:  Lightheadedness    Additional concerns today:  Yes    Today's PHQ-9 Score:       1/29/2024     9:15 AM   PHQ-9 SCORE   PHQ-9 Total Score MyChart 3 (Minimal depression)   PHQ-9 Total Score 3         Additional provider notes: Patient presents in clinic for annual physical.     Exercise: walking dog 4-5 days a week. Rollerblading once a week.     Diet: feels she has a generally healthy diet.     Allergies   Allergen Reactions    Seasonal Allergies        No current outpatient medications on file.     No current facility-administered medications for this visit.       Past Medical History:   Diagnosis Date    Anxiety     controlled not on medications    Donor of kidney for transplant 2019    IBS (irritable bowel syndrome)                   Social History     Tobacco Use    Smoking status: Never     Passive exposure: Never    Smokeless tobacco: Never   Substance Use Topics    Alcohol use: Yes     Comment: 3/week             1/29/2024     9:17 AM   Alcohol Use   Prescreen: >3 drinks/day or >7 drinks/week? No     Reviewed orders with patient.  Reviewed health  maintenance and updated orders accordingly - Yes      Breast Cancer Screenin/6/2021     8:56 AM   Breast CA Risk Assessment (FHS-7)   Do you have a family history of breast, colon, or ovarian cancer? No / Unknown         Mammogram Screening: Recommended annual mammography  Pertinent mammograms are reviewed under the imaging tab.    History of abnormal Pap smear: NO - age 30-65 PAP every 5 years with negative HPV co-testing recommended      Latest Ref Rng & Units 2023     3:43 PM 2018    12:25 PM 2018     9:40 AM   PAP / HPV   PAP  Negative for Intraepithelial Lesion or Malignancy (NILM)      PAP (Historical)   NIL     HPV 16 DNA Negative Negative   Negative    HPV 18 DNA Negative Negative   Negative    Other HR HPV Negative Negative   Negative      Reviewed and updated as needed this visit by clinical staff   Tobacco  Allergies  Meds              Reviewed and updated as needed this visit by Provider                  Past Medical History:   Diagnosis Date    Anxiety     controlled not on medications    Donor of kidney for transplant     IBS (irritable bowel syndrome)       Past Surgical History:   Procedure Laterality Date    LAPAROSCOPIC DONOR HAND ASSISTED KIDNEY LIVING RELATED Right 2019    Procedure: Laparoscopic Hand Assisted Living Related Kidney Donor;  Surgeon: Cali Pérez MD;  Location: [facility] OR     OB History    Para Term  AB Living   2 2 2 0 0 2   SAB IAB Ectopic Multiple Live Births   0 0 0 0 0      # Outcome Date GA Lbr [place]/2nd Weight Sex Delivery Anes PTL Lv   2 Term            1 Term               Obstetric Comments   Normal delivery      Review of Systems   Constitutional:  Negative for chills and fever.   HENT:  Negative for congestion, ear pain, hearing loss and sore throat.    Eyes:  Negative for pain and visual disturbance.   Respiratory:  Negative for cough and shortness of breath.    Cardiovascular:  Negative for chest pain and palpitations.  "  Gastrointestinal:  Negative for abdominal pain, constipation, diarrhea and nausea.   Genitourinary:  Negative for dysuria, frequency, genital sores, hematuria, pelvic pain, urgency, vaginal bleeding and vaginal discharge.   Musculoskeletal:  Negative for arthralgias, joint swelling and myalgias.   Skin:  Negative for rash.   Neurological:  Negative for dizziness, weakness and headaches.   Psychiatric/Behavioral:  The patient is not nervous/anxious.          OBJECTIVE:   /70 (BP Location: Right arm, Patient Position: Sitting, Cuff Size: Adult Regular)   Pulse 74   Temp 98.2  F (36.8  C) (Oral)   Resp 20   Ht 1.702 m (5' 7\")   Wt 59.7 kg (131 lb 9.6 oz)   LMP 01/08/2024 (Approximate)   SpO2 98%   Breastfeeding No   BMI 20.61 kg/m     Estimated body mass index is 20.61 kg/m  as calculated from the following:    Height as of this encounter: 1.702 m (5' 7\").    Weight as of this encounter: 59.7 kg (131 lb 9.6 oz).  Physical Exam  GENERAL: alert and no distress  EYES: Eyes grossly normal to inspection, PERRL and conjunctivae and sclerae normal  HENT: ear canals and TM's normal, nose and mouth without ulcers or lesions  NECK: no adenopathy, no asymmetry, masses, or scars  RESP: lungs clear to auscultation - no rales, rhonchi or wheezes  BREAST: normal without masses, tenderness or nipple discharge and no palpable axillary masses or adenopathy/ slightly dense breast tissue, no abnormal masses noted  CV: regular rate and rhythm, normal S1 S2, no S3 or S4, no murmur, click or rub, no peripheral edema  ABDOMEN: soft, nontender, no hepatosplenomegaly, no masses and bowel sounds normal  MS: no gross musculoskeletal defects noted, no edema  SKIN: no suspicious lesions or rashes  NEURO: Normal strength and tone, mentation intact and speech normal  PSYCH: mentation appears normal, affect normal/bright  LYMPH: no cervical, supraclavicular, axillary, or inguinal adenopathy    Diagnostic Test Results:  Labs reviewed " in Epic    ASSESSMENT/PLAN:   Routine general medical examination at a health care facility  No concerns today.     History of living-donor kidney transplantation  BMP done today. She declined urine albumin this visit. No concerns today.     - Basic metabolic panel  (Ca, Cl, CO2, Creat, Gluc, K, Na, BUN)    Visit for screening mammogram  Screening.     - MA SCREENING DIGITAL BILAT - Future  (s+30); Future    Screening for thyroid disorder  Screening    - TSH with free T4 reflex    Screening for diabetes mellitus  Screening    - Basic metabolic panel  (Ca, Cl, CO2, Creat, Gluc, K, Na, BUN)    Encounter for vitamin deficiency screening  Screening    - Vitamin D Deficiency    Need for vaccination    - TDAP 10-64Y (ADACEL,BOOSTRIX)    Patient has been advised of split billing requirements and indicates understanding: No      Counseling  Reviewed preventive health counseling, as reflected in patient instructions       Regular exercise       Healthy diet/nutrition       Vision screening       Immunizations  Vaccinated for: TDAP         Osteoporosis prevention/bone health       Colorectal Cancer Screening        She reports that she has never smoked. She has never been exposed to tobacco smoke. She has never used smokeless tobacco.        Signed Electronically by: Soni Charles DNP  Answers submitted by the patient for this visit:  Patient Health Questionnaire (Submitted on 1/29/2024)  If you checked off any problems, how difficult have these problems made it for you to do your work, take care of things at home, or get along with other people?: Somewhat difficult  PHQ9 TOTAL SCORE: 3  Annual Preventive Visit (Submitted on 1/29/2024)  Chief Complaint: Annual Exam:  Blood in stool: No  heartburn: No  peripheral edema: No  mood changes: No  Skin sensation changes: No  tenderness: No  breast mass: No  breast discharge: No

## 2024-02-08 ENCOUNTER — MYC MEDICAL ADVICE (OUTPATIENT)
Dept: FAMILY MEDICINE | Facility: CLINIC | Age: 49
End: 2024-02-08
Payer: COMMERCIAL

## 2024-02-09 ENCOUNTER — OFFICE VISIT (OUTPATIENT)
Dept: FAMILY MEDICINE | Facility: CLINIC | Age: 49
End: 2024-02-09
Payer: COMMERCIAL

## 2024-02-09 VITALS
DIASTOLIC BLOOD PRESSURE: 73 MMHG | OXYGEN SATURATION: 99 % | SYSTOLIC BLOOD PRESSURE: 113 MMHG | BODY MASS INDEX: 20.52 KG/M2 | RESPIRATION RATE: 16 BRPM | WEIGHT: 131 LBS | HEART RATE: 79 BPM | TEMPERATURE: 99 F

## 2024-02-09 DIAGNOSIS — S91.352A CAT BITE OF LEFT FOOT, INITIAL ENCOUNTER: Primary | ICD-10-CM
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
## 2024-02-09 PROCEDURE — 99213 OFFICE O/P EST LOW 20 MIN: CPT | Performed by: FAMILY MEDICINE

## 2024-02-09 NOTE — PROGRESS NOTES
Assessment & Plan     Cat bite of left foot, initial encounter  Mild infection possibly. No sign bone joint involvement.   - amoxicillin-clavulanate (AUGMENTIN) 875-125 MG tablet  Dispense: 10 tablet; Refill: 0       [id number]}    Return in about 5 days (around 2/14/2024) for follow up with your regular clinic if needed.    Salomón Torrez MD  [facility]    ------------------------------------------------------------------------  Subjective     Elly LORENZO Ballard presents to clinic today for the following health issues:  chief complaint  HPI  49 yo with cat bit about 36 hours ago to left foot. Pain and mild swelling, bruising, mild redness noted. No fever.     Tetanus up to date.     Review of Systems        Objective    /73   Pulse 79   Temp 99  F (37.2  C)   Resp 16   Wt 59.4 kg (131 lb)   LMP 01/08/2024 (Approximate)   SpO2 99%   BMI 20.52 kg/m    Physical Exam   GENERAL: alert and no distress  SKIN: about 5 punctum of cat bites. Mild erythema,swelling, bruising not very warm. Movement appears normal. None appear about the joints or with bony involvement.

## 2024-02-21 ENCOUNTER — OFFICE VISIT (OUTPATIENT)
Dept: OPTOMETRY | Facility: CLINIC | Age: 49
End: 2024-02-21
Payer: COMMERCIAL

## 2024-02-21 DIAGNOSIS — H52.4 PRESBYOPIA: ICD-10-CM

## 2024-02-21 DIAGNOSIS — H52.13 MYOPIA OF BOTH EYES: ICD-10-CM

## 2024-02-21 DIAGNOSIS — Z01.00 EXAMINATION OF EYES AND VISION: Primary | ICD-10-CM

## 2024-02-21 PROCEDURE — 92015 DETERMINE REFRACTIVE STATE: CPT | Performed by: OPTOMETRIST

## 2024-02-21 PROCEDURE — 92004 COMPRE OPH EXAM NEW PT 1/>: CPT | Performed by: OPTOMETRIST

## 2024-02-21 ASSESSMENT — KERATOMETRY
OS_K2POWER_DIOPTERS: 46.00
OD_AXISANGLE_DEGREES: 045
OD_K1POWER_DIOPTERS: 45.75
OS_AXISANGLE2_DEGREES: 007
OS_AXISANGLE_DEGREES: 097
OD_K2POWER_DIOPTERS: 46.00
OD_AXISANGLE2_DEGREES: 135
OS_K1POWER_DIOPTERS: 45.50

## 2024-02-21 ASSESSMENT — REFRACTION_WEARINGRX
OD_SPHERE: +1.25
OS_SPHERE: +1.25
SPECS_TYPE: OTC READERS

## 2024-02-21 ASSESSMENT — REFRACTION_MANIFEST
OS_SPHERE: -0.25
METHOD_AUTOREFRACTION: 1
OD_ADD: +1.75
OS_ADD: +1.75
OD_CYLINDER: SPHERE
OD_SPHERE: -0.25
OS_CYLINDER: SPHERE

## 2024-02-21 ASSESSMENT — CUP TO DISC RATIO
OD_RATIO: 0.15
OS_RATIO: 0.15

## 2024-02-21 ASSESSMENT — TONOMETRY
OD_IOP_MMHG: 12
IOP_METHOD: TONOPEN
OS_IOP_MMHG: 14

## 2024-02-21 ASSESSMENT — SLIT LAMP EXAM - LIDS
COMMENTS: NORMAL
COMMENTS: NORMAL

## 2024-02-21 ASSESSMENT — VISUAL ACUITY
OD_SC: 20/20
OD_CC: 20/20
METHOD: SNELLEN - LINEAR
OS_SC: 20/20
OS_CC: 20/20
CORRECTION_TYPE: GLASSES

## 2024-02-21 ASSESSMENT — EXTERNAL EXAM - RIGHT EYE: OD_EXAM: NORMAL

## 2024-02-21 ASSESSMENT — CONF VISUAL FIELD
OS_INFERIOR_TEMPORAL_RESTRICTION: 0
OS_SUPERIOR_NASAL_RESTRICTION: 0
OS_NORMAL: 1
OS_SUPERIOR_TEMPORAL_RESTRICTION: 0
OD_NORMAL: 1
OS_INFERIOR_NASAL_RESTRICTION: 0
OD_INFERIOR_TEMPORAL_RESTRICTION: 0
OD_SUPERIOR_TEMPORAL_RESTRICTION: 0
OD_INFERIOR_NASAL_RESTRICTION: 0
OD_SUPERIOR_NASAL_RESTRICTION: 0

## 2024-02-21 ASSESSMENT — EXTERNAL EXAM - LEFT EYE: OS_EXAM: NORMAL

## 2024-02-21 NOTE — PATIENT INSTRUCTIONS
Eyeglass prescription given.    Return in 1 year for a complete eye exam or sooner if needed.    Jaren Coleman, SOUMYA    The affects of the dilating drops last for 4- 6 hours.  You will be more sensitive to light and vision will be blurry up close.  Do not drive if you do not feel comfortable.  Mydriatic sunglasses were given if needed.      Optometry Providers       Clinic Locations                                 Telephone Number   Dr. Dimple Norton [place]    [facility]  [place] [phone]     [place] [facility] Hours:                [place] [facility] Hours:       [place] [facility] Hours:   [address]   [address]      [address] [place] [address]   [place] [address]    [place] [address]  Phone: [phone]                    Phone: [phone]     Phone: [phone]                      Monday 8:00-6:00                          Monday 8:00-6:00                          Monday 8:00-6:00              Tuesday 8:00-6:00                          Tuesday 8:00-6:00                          Tuesday 8:00-6:00              Wednesday 8:00-6:00                  Wednesday 8:00-6:00                   Wednesday 8:00-6:00      Thursday 8:00-6:00                        Thursday 8:00-6:00                         Thursday 8:00-6:00            Friday 8:00-5:00                              Friday 8:00-5:00                              Friday 8:00-5:00    [facility] Hours:   [address] [place], [place] [id number] [phone]    Monday 9:00-6:00  Tuesday 9:00-6:00  Wednesday 9:00-6:00  Thursday 9:00-6:00  Friday 9:00-5:00  As always, Thank you for trusting us with your health care needs!

## 2024-02-21 NOTE — PROGRESS NOTES
Chief Complaint   Patient presents with    Annual Eye Exam        Patient is a kidney donor and only has 1 kidney     Last Eye Exam: 10-3-2018  Dilated Previously: Yes    What are you currently using to see?  Otc readers    Distance Vision Acuity: Satisfied with vision,maybe night driving a little harder    Near Vision Acuity: Satisfied with vision while reading  with  otc readers    Eye Comfort: good  Do you use eye drops? : No  Occupation or Hobbies:  at a [employer]     Marcy Ojeda Optometric Assistant, A.B.O.C.      Medical, surgical and family histories reviewed and updated 2/21/2024.       OBJECTIVE: See Ophthalmology exam    ASSESSMENT:    ICD-10-CM    1. Examination of eyes and vision  Z01.00 EYE EXAM (SIMPLE-NONBILLABLE)      2. Presbyopia  H52.4 REFRACTION      3. Myopia of both eyes  H52.13 REFRACTION          PLAN:     Patient Instructions   Eyeglass prescription given.    Return in 1 year for a complete eye exam or sooner if needed.    Jaren Coleman, OD

## 2024-02-21 NOTE — LETTER
2/21/2024         RE: Elly Ballard  [address] [address]        Dear Colleague,    Thank you for referring your patient, Elly Ballard, to the [facility]. Please see a copy of my visit note below.    Chief Complaint   Patient presents with     Annual Eye Exam        Patient is a kidney donor and only has 1 kidney     Last Eye Exam: 10-3-2018  Dilated Previously: Yes    What are you currently using to see?  Otc readers    Distance Vision Acuity: Satisfied with vision,maybe night driving a little harder    Near Vision Acuity: Satisfied with vision while reading  with  otc readers    Eye Comfort: good  Do you use eye drops? : No  Occupation or Hobbies:  at a nonprofit     Marcy Ojeda Optometric Assistant, JANELLBKeshavOKeshavC.      Medical, surgical and family histories reviewed and updated 2/21/2024.       OBJECTIVE: See Ophthalmology exam    ASSESSMENT:    ICD-10-CM    1. Examination of eyes and vision  Z01.00 EYE EXAM (SIMPLE-NONBILLABLE)      2. Presbyopia  H52.4 REFRACTION      3. Myopia of both eyes  H52.13 REFRACTION          PLAN:     Patient Instructions   Eyeglass prescription given.    Return in 1 year for a complete eye exam or sooner if needed.    Jaren Coleman OD         Again, thank you for allowing me to participate in the care of your patient.        Sincerely,        Jaren Coleman, OD

## 2024-02-26 ENCOUNTER — ANCILLARY PROCEDURE (OUTPATIENT)
Dept: MAMMOGRAPHY | Facility: CLINIC | Age: 49
End: 2024-02-26
Attending: NURSE PRACTITIONER
Payer: COMMERCIAL

## 2024-02-26 DIAGNOSIS — Z12.31 VISIT FOR SCREENING MAMMOGRAM: ICD-10-CM

## 2024-02-26 PROCEDURE — 77063 BREAST TOMOSYNTHESIS BI: CPT

## 2024-02-26 PROCEDURE — 77063 BREAST TOMOSYNTHESIS BI: CPT | Mod: 26 | Performed by: RADIOLOGY

## 2024-02-26 PROCEDURE — 77067 SCR MAMMO BI INCL CAD: CPT | Mod: 26 | Performed by: RADIOLOGY

## 2024-02-29 ENCOUNTER — OFFICE VISIT (OUTPATIENT)
Dept: FAMILY MEDICINE | Facility: CLINIC | Age: 49
End: 2024-02-29
Payer: COMMERCIAL

## 2024-02-29 VITALS
RESPIRATION RATE: 15 BRPM | TEMPERATURE: 99 F | HEART RATE: 65 BPM | HEIGHT: 67 IN | WEIGHT: 133.5 LBS | SYSTOLIC BLOOD PRESSURE: 129 MMHG | DIASTOLIC BLOOD PRESSURE: 74 MMHG | OXYGEN SATURATION: 99 % | BODY MASS INDEX: 20.95 KG/M2

## 2024-02-29 DIAGNOSIS — Z63.8 CAREGIVER ROLE STRAIN: ICD-10-CM

## 2024-02-29 DIAGNOSIS — R94.6 ABNORMAL FINDING ON THYROID FUNCTION TEST: Primary | ICD-10-CM

## 2024-02-29 LAB — TSH SERPL DL<=0.005 MIU/L-ACNC: 5.67 UIU/ML (ref 0.3–4.2)

## 2024-02-29 PROCEDURE — 99214 OFFICE O/P EST MOD 30 MIN: CPT | Performed by: NURSE PRACTITIONER

## 2024-02-29 PROCEDURE — 84443 ASSAY THYROID STIM HORMONE: CPT | Performed by: NURSE PRACTITIONER

## 2024-02-29 PROCEDURE — 84439 ASSAY OF FREE THYROXINE: CPT | Performed by: NURSE PRACTITIONER

## 2024-02-29 PROCEDURE — 36415 COLL VENOUS BLD VENIPUNCTURE: CPT | Performed by: NURSE PRACTITIONER

## 2024-02-29 SDOH — SOCIAL STABILITY - SOCIAL INSECURITY: OTHER SPECIFIED PROBLEMS RELATED TO PRIMARY SUPPORT GROUP: Z63.8

## 2024-02-29 NOTE — PROGRESS NOTES
"    ICD-10-CM    1. Abnormal finding on thyroid function test  R94.6 TSH with free T4 reflex     TSH with free T4 reflex      2. Caregiver role strain  Z63.8         Recheck TSH  Discussed establish care with counseling; take time for self care    Robb Sanabria is a 48 year old, presenting for the following health issues:  Thyroid Problem (Labs showed hypothyroidism at physical 1/29/24)      2/29/2024     8:56 AM   Additional Questions   Roomed by Jazmine Kumari   Labs showed hypothyroidism at physical 1/29/24  History of Present Illness       Hypothyroidism:     Since last visit, patient describes the following symptoms::  None    She eats 2-3 servings of fruits and vegetables daily.She consumes 1 sweetened beverage(s) daily.She exercises with enough effort to increase her heart rate 20 to 29 minutes per day.  She exercises with enough effort to increase her heart rate 4 days per week.   She is taking medications regularly.     Recently a very slight increase in weight. She exercises regularly (walking and rollerblading) but no weight training. She has not noticed skin/hair issues. She is currently under a lot of stress caring for her teenage children. Her oldest child is Autistic and Trans, and may have POTS/EDS and has had multiple strange symptoms. She is working on navigating medical care for them. She continues to sleep, eat well, and feels that her mood is stable. She had a counselor in the past, does not see someone currently.    Review of Systems  Constitutional, HEENT, cardiovascular, pulmonary, gi and gu systems are negative, except as otherwise noted.      Objective    /74   Pulse 65   Temp 99  F (37.2  C) (Temporal)   Resp 15   Ht 1.702 m (5' 7.01\")   Wt 60.6 kg (133 lb 8 oz)   LMP 01/30/2024 (Within Days)   SpO2 99%   BMI 20.90 kg/m    Body mass index is 20.9 kg/m .  Physical Exam   GENERAL: alert and no distress  NECK: no adenopathy, no asymmetry, masses, or scars  RESP: lungs clear " to auscultation - no rales, rhonchi or wheezes  CV: regular rate and rhythm, normal S1 S2, no S3 or S4, no murmur, click or rub, no peripheral edema  MS: no gross musculoskeletal defects noted, no edema  PSYCH: mentation appears normal, affect normal/bright    Office Visit on 01/29/2024   Component Date Value Ref Range Status    TSH 01/29/2024 5.08 (H)  0.30 - 4.20 uIU/mL Final    Sodium 01/29/2024 136  135 - 145 mmol/L Final    Reference intervals for this test were updated on 09/26/2023 to more accurately reflect our healthy population. There may be differences in the flagging of prior results with similar values performed with this method. Interpretation of those prior results can be made in the context of the updated reference intervals.     Potassium 01/29/2024 4.4  3.4 - 5.3 mmol/L Final    Chloride 01/29/2024 102  98 - 107 mmol/L Final    Carbon Dioxide (CO2) 01/29/2024 27  22 - 29 mmol/L Final    Anion Gap 01/29/2024 7  7 - 15 mmol/L Final    Urea Nitrogen 01/29/2024 10.1  6.0 - 20.0 mg/dL Final    Creatinine 01/29/2024 0.89  0.51 - 0.95 mg/dL Final    GFR Estimate 01/29/2024 80  >60 mL/min/1.73m2 Final    Calcium 01/29/2024 9.1  8.6 - 10.0 mg/dL Final    Glucose 01/29/2024 76  70 - 99 mg/dL Final    Vitamin D, Total (25-Hydroxy) 01/29/2024 26  20 - 50 ng/mL Final    optimum levels    Free T4 01/29/2024 1.26  0.90 - 1.70 ng/dL Final           Signed Electronically by: HERMINIO Bhatt CNP

## 2024-03-01 LAB — T4 FREE SERPL-MCNC: 1.28 NG/DL (ref 0.9–1.7)

## 2024-04-02 ENCOUNTER — OFFICE VISIT (OUTPATIENT)
Dept: DERMATOLOGY | Facility: CLINIC | Age: 49
End: 2024-04-02
Payer: COMMERCIAL

## 2024-04-02 DIAGNOSIS — L81.4 LENTIGO: ICD-10-CM

## 2024-04-02 DIAGNOSIS — D22.9 MULTIPLE BENIGN NEVI: ICD-10-CM

## 2024-04-02 DIAGNOSIS — L82.1 SEBORRHEIC KERATOSES: ICD-10-CM

## 2024-04-02 DIAGNOSIS — Z80.8 FAMILY HISTORY OF MELANOMA: ICD-10-CM

## 2024-04-02 DIAGNOSIS — D18.01 ANGIOMA OF SKIN: ICD-10-CM

## 2024-04-02 DIAGNOSIS — D23.9 DERMAL NEVUS: Primary | ICD-10-CM

## 2024-04-02 PROCEDURE — 99203 OFFICE O/P NEW LOW 30 MIN: CPT | Performed by: DERMATOLOGY

## 2024-04-02 NOTE — LETTER
4/2/2024         RE: Elly Ballard  [address] [facility] [address]        Dear Colleague,    Thank you for referring your patient, Elly Ballard, to the [facility]. Please see a copy of my visit note below.    Elly Ballard is an extremely pleasant 48 year old year old female patient here today for fmhx of melanoma in sister.  She notes brown spots on legs.  Patient has no other skin complaints today.  Remainder of the HPI, Meds, PMH, Allergies, FH, and SH was reviewed in chart.      Past Medical History:   Diagnosis Date     Anxiety     controlled not on medications     Donor of kidney for transplant 2019     IBS (irritable bowel syndrome)        Past Surgical History:   Procedure Laterality Date     LAPAROSCOPIC DONOR HAND ASSISTED KIDNEY LIVING RELATED Right 04/16/2019    Procedure: Laparoscopic Hand Assisted Living Related Kidney Donor;  Surgeon: Cali Pérez MD;  Location: [facility] OR        Family History   Problem Relation Age of Onset     Pulmonary Embolism Mother      Depression Mother      Anxiety Disorder Mother      Hypertension Mother      Macular Degeneration Father      Family History Negative Father      Chronic Kidney Disease Sister         anatomical      Other - See Comments Sister         corneal disorder     Mental Illness Sister         possible ASD; eating disorder     Osteoporosis Sister         hx of stress fracture     Diabetes Sister      Cancer Sister      Breast Cancer Sister 62     Osteopenia Sister      Family History Negative Brother      Substance Abuse Brother      Substance Abuse Brother      Other - See Comments Maternal Grandmother         TIA     Parkinsonism Maternal Grandfather      No Known Problems Paternal Grandmother      No Known Problems Paternal Grandfather      Other - See Comments Daughter      Autoimmune Disease Daughter         affects joints     Post-Traumatic Stress Disorder (PTSD) Daughter         b. 2001     Other -  See Comments Son         b.2004 ASD; ADHD     Autoimmune Disease Son         POTS; EDS; chronic pain     Macular Degeneration Paternal Aunt      Glaucoma No family hx of        Social History     Socioeconomic History     Marital status:      Spouse name: Not on file     Number of children: Not on file     Years of education: Not on file     Highest education level: Not on file   Occupational History     Comment:  for a non profit orgnization    Tobacco Use     Smoking status: Never     Passive exposure: Never     Smokeless tobacco: Never   Vaping Use     Vaping Use: Never used   Substance and Sexual Activity     Alcohol use: Yes     Comment: 3/week     Drug use: No     Sexual activity: Yes     Partners: Male   Other Topics Concern     Parent/sibling w/ CABG, MI or angioplasty before 65F 55M? Not Asked   Social History Narrative     Not on file     Social Determinants of Health     Financial Resource Strain: Low Risk  (1/29/2024)    Financial Resource Strain      Within the past 12 months, have you or your family members you live with been unable to get utilities (heat, electricity) when it was really needed?: No   Food Insecurity: Low Risk  (1/29/2024)    Food Insecurity      Within the past 12 months, did you worry that your food would run out before you got money to buy more?: No      Within the past 12 months, did the food you bought just not last and you didn t have money to get more?: No   Transportation Needs: Low Risk  (1/29/2024)    Transportation Needs      Within the past 12 months, has lack of transportation kept you from medical appointments, getting your medicines, non-medical meetings or appointments, work, or from getting things that you need?: No   Physical Activity: Unknown (1/4/2019)    Exercise Vital Sign      Days of Exercise per Week: 1 day      Minutes of Exercise per Session: Not on file   Stress: Stress Concern Present (1/4/2019)    [employer] [place] [employer] - Occupational Stress Questionnaire      Feeling of Stress : Very much   Social Connections: Not on file   Interpersonal Safety: Low Risk  (1/29/2024)    Interpersonal Safety      Do you feel physically and emotionally safe where you currently live?: Yes      Within the past 12 months, have you been hit, slapped, kicked or otherwise physically hurt by someone?: No      Within the past 12 months, have you been humiliated or emotionally abused in other ways by your partner or ex-partner?: No   Housing Stability: Low Risk  (1/29/2024)    Housing Stability      Do you have housing? : Yes      Are you worried about losing your housing?: No       No outpatient encounter medications on file as of 4/2/2024.     No facility-administered encounter medications on file as of 4/2/2024.             O:   NAD, WDWN, Alert & Oriented, Mood & Affect wnl, Vitals stable   General appearance normal   Vitals stable   Alert, oriented and in no acute distress     Rare pigmented macules on trunk and ext with regular borders and pigment networks     Stuck on papules and brown macules on trunk and ext   Red papules on trunk  Flesh colored papules on trunk     The remainder of the full exam was normal; the following areas were examined:  conjunctiva/lids, , neck, peripheral vascular system, abdomen, lymph nodes, digits/nails, eccrine and apocrine glands, scalp/hair, face, neck, chest, abdomen, buttocks, back, RUE, LUE, RLE, LLE       Eyes: Conjunctivae/lids:Normal     ENT: Lips, buccal mucosa, tongue: normal    MSK:Normal    Cardiovascular: peripheral edema none    Pulm: Breathing Normal    Lymph Nodes: No Head and Neck Lymphadenopathy     Neuro/Psych: Orientation:Alert and Orientedx3 ; Mood/Affect:normal       A/P:  1. Seborrheic keratosis, lentigo, angioma, dermal nevus, fmhx of melanoma, nevi   Risk of melanoma discussed with patient   It was a pleasure speaking to Elly Ballard today.  Previous clinic notes and pertinent  laboratory tests were reviewed prior to Elly Ballard's visit.  Nature and genetics of benign skin lesions dicussed with patient.  Signs and Symptoms of skin cancer discussed with patient.  Patient encouraged to perform monthly skin exams.  UV precautions reviewed with patient.  Return to clinic 12 months      Again, thank you for allowing me to participate in the care of your patient.        Sincerely,        Chucky Thakkar MD

## 2024-04-02 NOTE — PATIENT INSTRUCTIONS
Patient Education       Proper skin care from [facility]:    -Eliminate harsh soaps as they strip the natural oils from the skin, often resulting in dry itchy skin ( i.e. Dial, Zest, [place] Spring)  -Use mild soaps such as Cetaphil or Dove Sensitive Skin in the shower. You do not need to use soap on arms, legs, and trunk every time you shower unless visibly soiled.   -Avoid hot or cold showers.  -After showering, lightly dry off and apply moisturizing within 2-3 minutes. This will help trap moisture in the skin.   -Aggressive use of a moisturizer at least 1-2 times a day to the entire body (including -Vanicream, Cetaphil, Aquaphor or Cerave) and moisturize hands after every washing.  -We recommend using moisturizers that come in a tub that needs to be scooped out, not a pump. This has more of an oil base. It will hold moisture in your skin much better than a water base moisturizer. The above recommended are non-pore clogging.      Wear a sunscreen with at least SPF 30 on your face, ears, neck and V of the chest daily. Wear sunscreen on other areas of the body if those areas are exposed to the sun throughout the day. Sunscreens can contain physical and/or chemical blockers. Physical blockers are less likely to clog pores, these include zinc oxide and titanium dioxide. Reapply every two hour and after swimming.     Sunscreen examples: https://www.ewg.org/sunscreen/    UV radiation  UVA radiation remains constant throughout the day and throughout the year. It is a longer wavelength than UVB and therefore penetrates deeper into the skin leading to immediate and delayed tanning, photoaging, and skin cancer. 70-80% of UVA and UVB radiation occurs between the hours of 10am-2pm.  UVB radiation  UVB radiation causes the most harmful effects and is more significant during the summer months. However, snow and ice can reflect UVB radiation leading to skin damage during the winter months as well. UVB radiation is  responsible for tanning, burning, inflammation, delayed erythema (pinkness), pigmentation (brown spots), and skin cancer.     I recommend self monthly full body exams and yearly full body exams with a dermatology provider. If you develop a new or changing lesion please follow up for examination. Most skin cancers are pink and scaly or pink and pearly. However, we do see blue/brown/black skin cancers.  Consider the ABCDEs of melanoma when giving yourself your monthly full body exam ( don't forget the groin, buttocks, feet, toes, etc). A-asymmetry, B-borders, C-color, D-diameter, E-elevation or evolving. If you see any of these changes please follow up in clinic. If you cannot see your back I recommend purchasing a hand held mirror to use with a larger wall mirror.       Checking for Skin Cancer  You can find cancer early by checking your skin each month. There are 3 kinds of skin cancer. They are melanoma, basal cell carcinoma, and squamous cell carcinoma. Doing monthly skin checks is the best way to find new marks or skin changes. Follow the instructions below for checking your skin.   The ABCDEs of checking moles for melanoma   Check your moles or growths for signs of melanoma using ABCDE:   Asymmetry: the sides of the mole or growth don t match  Border: the edges are ragged, notched, or blurred  Color: the color within the mole or growth varies  Diameter: the mole or growth is larger than 6 mm (size of a pencil eraser)  Evolving: the size, shape, or color of the mole or growth is changing (evolving is not shown in the images below)    Checking for other types of skin cancer  Basal cell carcinoma or squamous cell carcinoma have symptoms such as:     A spot or mole that looks different from all other marks on your skin  Changes in how an area feels, such as itching, tenderness, or pain  Changes in the skin's surface, such as oozing, bleeding, or scaliness  A sore that does not heal  New swelling or redness beyond  the border of a mole    Who s at risk?  Anyone can get skin cancer. But you are at greater risk if you have:   Fair skin, light-colored hair, or light-colored eyes  Many moles or abnormal moles on your skin  A history of sunburns from sunlight or tanning beds  A family history of skin cancer  A history of exposure to radiation or chemicals  A weakened immune system  If you have had skin cancer in the past, you are at risk for recurring skin cancer.   How to check your skin  Do your monthly skin checkups in front of a full-length mirror. Check all parts of your body, including your:   Head (ears, face, neck, and scalp)  Torso (front, back, and sides)  Arms (tops, undersides, upper, and lower armpits)  Hands (palms, backs, and fingers, including under the nails)  Buttocks and genitals  Legs (front, back, and sides)  Feet (tops, soles, toes, including under the nails, and between toes)  If you have a lot of moles, take digital photos of them each month. Make sure to take photos both up close and from a distance. These can help you see if any moles change over time.   Most skin changes are not cancer. But if you see any changes in your skin, call your doctor right away. Only he or she can diagnose a problem. If you have skin cancer, seeing your doctor can be the first step toward getting the treatment that could save your life.   [employer] last reviewed this educational content on 4/1/2019 2000-2020 [employer]. [address] [place] [address], [place] [address]. All rights reserved. This information is not intended as a substitute for professional medical care. Always follow your healthcare professional's instructions.       When should I call my doctor?  If you are worsening or not improving, please, contact us or seek urgent care as noted below.     Who should I call with questions (adults)?  [facility] (adult and pediatric): [phone]  [facility]  [place] (adult): [phone]  [facility] ([place], [place], [place] and [place]) [phone]  For urgent needs outside of business hours call the [facility] at [phone] and ask for the dermatology resident on call to be paged  If this is a medical emergency and you are unable to reach an ER, Call 911      If you need a prescription refill, please contact your pharmacy. Refills are approved or denied by our Physicians during normal business hours, Monday through Fridays  Per office policy, refills will not be granted if you have not been seen within the past year (or sooner depending on your child's condition)

## 2024-04-02 NOTE — PROGRESS NOTES
Elly Ballard is an extremely pleasant 48 year old year old female patient here today for fmhx of melanoma in sister.  She notes brown spots on legs.  Patient has no other skin complaints today.  Remainder of the HPI, Meds, PMH, Allergies, FH, and SH was reviewed in chart.      Past Medical History:   Diagnosis Date    Anxiety     controlled not on medications    Donor of kidney for transplant 2019    IBS (irritable bowel syndrome)        Past Surgical History:   Procedure Laterality Date    LAPAROSCOPIC DONOR HAND ASSISTED KIDNEY LIVING RELATED Right 04/16/2019    Procedure: Laparoscopic Hand Assisted Living Related Kidney Donor;  Surgeon: Cali Pérez MD;  Location: [facility] OR        Family History   Problem Relation Age of Onset    Pulmonary Embolism Mother     Depression Mother     Anxiety Disorder Mother     Hypertension Mother     Macular Degeneration Father     Family History Negative Father     Chronic Kidney Disease Sister         anatomical     Other - See Comments Sister         corneal disorder    Mental Illness Sister         possible ASD; eating disorder    Osteoporosis Sister         hx of stress fracture    Diabetes Sister     Cancer Sister     Breast Cancer Sister 62    Osteopenia Sister     Family History Negative Brother     Substance Abuse Brother     Substance Abuse Brother     Other - See Comments Maternal Grandmother         TIA    Parkinsonism Maternal Grandfather     No Known Problems Paternal Grandmother     No Known Problems Paternal Grandfather     Other - See Comments Daughter     Autoimmune Disease Daughter         affects joints    Post-Traumatic Stress Disorder (PTSD) Daughter         b. 2001    Other - See Comments Son         b.2004 ASD; ADHD    Autoimmune Disease Son         POTS; EDS; chronic pain    Macular Degeneration Paternal Aunt     Glaucoma No family hx of        Social History     Socioeconomic History    Marital status:      Spouse name: Not on file     Number of children: Not on file    Years of education: Not on file    Highest education level: Not on file   Occupational History     Comment:  for a non profit orgnization    Tobacco Use    Smoking status: Never     Passive exposure: Never    Smokeless tobacco: Never   Vaping Use    Vaping Use: Never used   Substance and Sexual Activity    Alcohol use: Yes     Comment: 3/week    Drug use: No    Sexual activity: Yes     Partners: Male   Other Topics Concern    Parent/sibling w/ CABG, MI or angioplasty before 65F 55M? Not Asked   Social History Narrative    Not on file     Social Determinants of Health     Financial Resource Strain: Low Risk  (1/29/2024)    Financial Resource Strain     Within the past 12 months, have you or your family members you live with been unable to get utilities (heat, electricity) when it was really needed?: No   Food Insecurity: Low Risk  (1/29/2024)    Food Insecurity     Within the past 12 months, did you worry that your food would run out before you got money to buy more?: No     Within the past 12 months, did the food you bought just not last and you didn t have money to get more?: No   Transportation Needs: Low Risk  (1/29/2024)    Transportation Needs     Within the past 12 months, has lack of transportation kept you from medical appointments, getting your medicines, non-medical meetings or appointments, work, or from getting things that you need?: No   Physical Activity: Unknown (1/4/2019)    Exercise Vital Sign     Days of Exercise per Week: 1 day     Minutes of Exercise per Session: Not on file   Stress: Stress Concern Present (1/4/2019)    [employer] [employer] - Occupational Stress Questionnaire     Feeling of Stress : Very much   Social Connections: Not on file   Interpersonal Safety: Low Risk  (1/29/2024)    Interpersonal Safety     Do you feel physically and emotionally safe where you currently live?: Yes     Within the past 12 months, have  you been hit, slapped, kicked or otherwise physically hurt by someone?: No     Within the past 12 months, have you been humiliated or emotionally abused in other ways by your partner or ex-partner?: No   Housing Stability: Low Risk  (1/29/2024)    Housing Stability     Do you have housing? : Yes     Are you worried about losing your housing?: No       No outpatient encounter medications on file as of 4/2/2024.     No facility-administered encounter medications on file as of 4/2/2024.             O:   NAD, WDWN, Alert & Oriented, Mood & Affect wnl, Vitals stable   General appearance normal   Vitals stable   Alert, oriented and in no acute distress     Rare pigmented macules on trunk and ext with regular borders and pigment networks     Stuck on papules and brown macules on trunk and ext   Red papules on trunk  Flesh colored papules on trunk     The remainder of the full exam was normal; the following areas were examined:  conjunctiva/lids, , neck, peripheral vascular system, abdomen, lymph nodes, digits/nails, eccrine and apocrine glands, scalp/hair, face, neck, chest, abdomen, buttocks, back, RUE, LUE, RLE, LLE       Eyes: Conjunctivae/lids:Normal     ENT: Lips, buccal mucosa, tongue: normal    MSK:Normal    Cardiovascular: peripheral edema none    Pulm: Breathing Normal    Lymph Nodes: No Head and Neck Lymphadenopathy     Neuro/Psych: Orientation:Alert and Orientedx3 ; Mood/Affect:normal       A/P:  1. Seborrheic keratosis, lentigo, angioma, dermal nevus, fmhx of melanoma, nevi   Risk of melanoma discussed with patient   It was a pleasure speaking to Elly Ballard today.  Previous clinic notes and pertinent laboratory tests were reviewed prior to Elly Ballard's visit.  Nature and genetics of benign skin lesions dicussed with patient.  Signs and Symptoms of skin cancer discussed with patient.  Patient encouraged to perform monthly skin exams.  UV precautions reviewed with patient.  Return to clinic 12  months

## 2024-10-14 ENCOUNTER — TELEPHONE (OUTPATIENT)
Dept: FAMILY MEDICINE | Facility: CLINIC | Age: 49
End: 2024-10-14

## 2024-10-14 ENCOUNTER — ALLIED HEALTH/NURSE VISIT (OUTPATIENT)
Dept: FAMILY MEDICINE | Facility: CLINIC | Age: 49
End: 2024-10-14
Payer: COMMERCIAL

## 2024-10-14 DIAGNOSIS — Z23 ENCOUNTER FOR IMMUNIZATION: Primary | ICD-10-CM

## 2024-10-14 PROCEDURE — 99207 PR NO CHARGE NURSE ONLY: CPT

## 2024-10-14 PROCEDURE — 90480 ADMN SARSCOV2 VAC 1/ONLY CMP: CPT

## 2024-10-14 PROCEDURE — 91320 SARSCV2 VAC 30MCG TRS-SUC IM: CPT

## 2024-10-14 NOTE — TELEPHONE ENCOUNTER
"Called patient and lmtcb,    Please transfer patient to nurse. I see that she is on our RN schedule this afternoon with the following note: \"[facility] wouldn't do it because I faint (briefly) 12 hoursa after \"    Please clarify why she is coming in and gather more details on her fainting?    lJ Lange, NEYDA     [facility]    "

## 2024-10-14 NOTE — PROGRESS NOTES
Addended by: NNAMDI VALDEZ on: 7/11/2024 03:18 PM     Modules accepted: Orders     Writer was present for this visit alongside NEYDA Martínez. Patient has a history of feeling unwell approx. 12 hours after receiving covid RNA vaccines in the past. Writer consulted with PCP's office and did get the okay to administer the covid vaccine today.     Patient aware of prior reactions and knows to stay well hydrated and to exercise caution until she feels well. Patient will seek medical attention if severe symptoms occur or persist.    Jl Lange RN     [facility]

## 2024-10-14 NOTE — TELEPHONE ENCOUNTER
"Patient returning call. She is scheduled for Covid booster this afternoon at [facility] with note stating, \"[facility] because I faint (briefly) 12 hoursa after- Covid booster.\"    Patient reports that exactly 12 hours after each of her previous Covid vaccinations she has \"fainted\" for about 1 hour. Patient elaborates to say that she starts to see spots, feels lightheaded and woozy like she will faint during this period of time. Denies any immediate or additional symptoms after vaccination and denies any similar reactions to other vaccines. Has NOT previously discussed this reaction with a provider. Routing to PCP to review and advise, thanks!    Erika Coffey RN    "

## 2024-10-14 NOTE — PROGRESS NOTES
Prior to immunization administration, verified patients identity using patient s name and date of birth. Please see Immunization Activity for additional information.     Is the patient's temperature normal (100.5 or less)? Yes     Patient MEETS CRITERIA. PROCEED with vaccine administration.      Patient instructed to remain in clinic for 15 minutes afterwards, and to report any adverse reactions.      Link to Ancillary Visit Immunization Standing Orders SmartSet     Screening performed by Cecilio Dejesus RN on 10/14/2024 at 2:18 PM.

## 2024-10-14 NOTE — TELEPHONE ENCOUNTER
NEYDA [facility] calling to confirm with PCP okay to administer new Covid Booster today given patient's history with vaccines (see note below).   Huddled with JANELL Briscoe and confirmed approval of vaccine; emphasized patient education for hydration, nutrition and level of activity based on how she is feeling.  Call or go in if experiencing s/e     Relayed provider message to RN.     Bria MUÑIZ RN  [facility]

## 2025-02-20 SDOH — HEALTH STABILITY: PHYSICAL HEALTH: ON AVERAGE, HOW MANY MINUTES DO YOU ENGAGE IN EXERCISE AT THIS LEVEL?: 30 MIN

## 2025-02-20 SDOH — HEALTH STABILITY: PHYSICAL HEALTH: ON AVERAGE, HOW MANY DAYS PER WEEK DO YOU ENGAGE IN MODERATE TO STRENUOUS EXERCISE (LIKE A BRISK WALK)?: 3 DAYS

## 2025-02-20 ASSESSMENT — ANXIETY QUESTIONNAIRES
1. FEELING NERVOUS, ANXIOUS, OR ON EDGE: MORE THAN HALF THE DAYS
3. WORRYING TOO MUCH ABOUT DIFFERENT THINGS: NEARLY EVERY DAY
IF YOU CHECKED OFF ANY PROBLEMS ON THIS QUESTIONNAIRE, HOW DIFFICULT HAVE THESE PROBLEMS MADE IT FOR YOU TO DO YOUR WORK, TAKE CARE OF THINGS AT HOME, OR GET ALONG WITH OTHER PEOPLE: SOMEWHAT DIFFICULT
GAD7 TOTAL SCORE: 11
GAD7 TOTAL SCORE: 11
7. FEELING AFRAID AS IF SOMETHING AWFUL MIGHT HAPPEN: SEVERAL DAYS
4. TROUBLE RELAXING: SEVERAL DAYS
8. IF YOU CHECKED OFF ANY PROBLEMS, HOW DIFFICULT HAVE THESE MADE IT FOR YOU TO DO YOUR WORK, TAKE CARE OF THINGS AT HOME, OR GET ALONG WITH OTHER PEOPLE?: SOMEWHAT DIFFICULT
5. BEING SO RESTLESS THAT IT IS HARD TO SIT STILL: SEVERAL DAYS
6. BECOMING EASILY ANNOYED OR IRRITABLE: SEVERAL DAYS
GAD7 TOTAL SCORE: 11
7. FEELING AFRAID AS IF SOMETHING AWFUL MIGHT HAPPEN: SEVERAL DAYS
2. NOT BEING ABLE TO STOP OR CONTROL WORRYING: MORE THAN HALF THE DAYS

## 2025-02-20 ASSESSMENT — SOCIAL DETERMINANTS OF HEALTH (SDOH): HOW OFTEN DO YOU GET TOGETHER WITH FRIENDS OR RELATIVES?: ONCE A WEEK

## 2025-02-25 ENCOUNTER — OFFICE VISIT (OUTPATIENT)
Dept: FAMILY MEDICINE | Facility: CLINIC | Age: 50
End: 2025-02-25
Payer: COMMERCIAL

## 2025-02-25 VITALS
OXYGEN SATURATION: 100 % | HEART RATE: 72 BPM | HEIGHT: 67 IN | TEMPERATURE: 98.4 F | RESPIRATION RATE: 14 BRPM | SYSTOLIC BLOOD PRESSURE: 96 MMHG | DIASTOLIC BLOOD PRESSURE: 62 MMHG | WEIGHT: 133.2 LBS | BODY MASS INDEX: 20.91 KG/M2

## 2025-02-25 DIAGNOSIS — Z13.21 ENCOUNTER FOR VITAMIN DEFICIENCY SCREENING: ICD-10-CM

## 2025-02-25 DIAGNOSIS — Z13.29 SCREENING FOR THYROID DISORDER: ICD-10-CM

## 2025-02-25 DIAGNOSIS — Z00.00 ROUTINE GENERAL MEDICAL EXAMINATION AT A HEALTH CARE FACILITY: Primary | ICD-10-CM

## 2025-02-25 DIAGNOSIS — Z13.1 SCREENING FOR DIABETES MELLITUS: ICD-10-CM

## 2025-02-25 DIAGNOSIS — Z13.220 LIPID SCREENING: ICD-10-CM

## 2025-02-25 PROBLEM — Z00.5 TRANSPLANT DONOR EVALUATION: Status: RESOLVED | Noted: 2018-12-17 | Resolved: 2025-02-25

## 2025-02-25 PROBLEM — F33.1 MAJOR DEPRESSIVE DISORDER, RECURRENT, MODERATE (H): Status: RESOLVED | Noted: 2018-06-07 | Resolved: 2025-02-25

## 2025-02-25 PROBLEM — Z52.4 ENCOUNTER FOR DONATION OF KIDNEY: Status: RESOLVED | Noted: 2019-04-16 | Resolved: 2025-02-25

## 2025-02-25 PROCEDURE — 36415 COLL VENOUS BLD VENIPUNCTURE: CPT | Performed by: NURSE PRACTITIONER

## 2025-02-25 PROCEDURE — 84443 ASSAY THYROID STIM HORMONE: CPT | Performed by: NURSE PRACTITIONER

## 2025-02-25 PROCEDURE — 3074F SYST BP LT 130 MM HG: CPT | Performed by: NURSE PRACTITIONER

## 2025-02-25 PROCEDURE — 99396 PREV VISIT EST AGE 40-64: CPT | Performed by: NURSE PRACTITIONER

## 2025-02-25 PROCEDURE — 80061 LIPID PANEL: CPT | Performed by: NURSE PRACTITIONER

## 2025-02-25 PROCEDURE — 82306 VITAMIN D 25 HYDROXY: CPT | Performed by: NURSE PRACTITIONER

## 2025-02-25 PROCEDURE — 1126F AMNT PAIN NOTED NONE PRSNT: CPT | Performed by: NURSE PRACTITIONER

## 2025-02-25 PROCEDURE — 84439 ASSAY OF FREE THYROXINE: CPT | Performed by: NURSE PRACTITIONER

## 2025-02-25 PROCEDURE — 80048 BASIC METABOLIC PNL TOTAL CA: CPT | Performed by: NURSE PRACTITIONER

## 2025-02-25 PROCEDURE — 3078F DIAST BP <80 MM HG: CPT | Performed by: NURSE PRACTITIONER

## 2025-02-25 ASSESSMENT — PAIN SCALES - GENERAL: PAINLEVEL_OUTOF10: NO PAIN (0)

## 2025-02-25 ASSESSMENT — PATIENT HEALTH QUESTIONNAIRE - PHQ9
10. IF YOU CHECKED OFF ANY PROBLEMS, HOW DIFFICULT HAVE THESE PROBLEMS MADE IT FOR YOU TO DO YOUR WORK, TAKE CARE OF THINGS AT HOME, OR GET ALONG WITH OTHER PEOPLE: SOMEWHAT DIFFICULT
SUM OF ALL RESPONSES TO PHQ QUESTIONS 1-9: 5
SUM OF ALL RESPONSES TO PHQ QUESTIONS 1-9: 5

## 2025-02-25 NOTE — PATIENT INSTRUCTIONS
Patient Education   Preventive Care Advice   This is general advice given by our system to help you stay healthy. However, your care team may have specific advice just for you. Please talk to your care team about your preventive care needs.  Nutrition  Eat 5 or more servings of fruits and vegetables each day.  Try wheat bread, brown rice and whole grain pasta (instead of white bread, rice, and pasta).  Get enough calcium and vitamin D. Check the label on foods and aim for 100% of the RDA (recommended daily allowance).  Lifestyle  Exercise at least 150 minutes each week  (30 minutes a day, 5 days a week).  Do muscle strengthening activities 2 days a week. These help control your weight and prevent disease.  No smoking.  Wear sunscreen to prevent skin cancer.  Have a dental exam and cleaning every 6 months.  Yearly exams  See your health care team every year to talk about:  Any changes in your health.  Any medicines your care team has prescribed.  Preventive care, family planning, and ways to prevent chronic diseases.  Shots (vaccines)   HPV shots (up to age 26), if you've never had them before.  Hepatitis B shots (up to age 59), if you've never had them before.  COVID-19 shot: Get this shot when it's due.  Flu shot: Get a flu shot every year.  Tetanus shot: Get a tetanus shot every 10 years.  Pneumococcal, hepatitis A, and RSV shots: Ask your care team if you need these based on your risk.  Shingles shot (for age 50 and up)  General health tests  Diabetes screening:  Starting at age 35, Get screened for diabetes at least every 3 years.  If you are younger than age 35, ask your care team if you should be screened for diabetes.  Cholesterol test: At age 39, start having a cholesterol test every 5 years, or more often if advised.  Bone density scan (DEXA): At age 50, ask your care team if you should have this scan for osteoporosis (brittle bones).  Hepatitis C: Get tested at least once in your life.  STIs (sexually  transmitted infections)  Before age 24: Ask your care team if you should be screened for STIs.  After age 24: Get screened for STIs if you're at risk. You are at risk for STIs (including HIV) if:  You are sexually active with more than one person.  You don't use condoms every time.  You or a partner was diagnosed with a sexually transmitted infection.  If you are at risk for HIV, ask about PrEP medicine to prevent HIV.  Get tested for HIV at least once in your life, whether you are at risk for HIV or not.  Cancer screening tests  Cervical cancer screening: If you have a cervix, begin getting regular cervical cancer screening tests starting at age 21.  Breast cancer scan (mammogram): If you've ever had breasts, begin having regular mammograms starting at age 40. This is a scan to check for breast cancer.  Colon cancer screening: It is important to start screening for colon cancer at age 45.  Have a colonoscopy test every 10 years (or more often if you're at risk) Or, ask your provider about stool tests like a FIT test every year or Cologuard test every 3 years.  To learn more about your testing options, visit:   .  For help making a decision, visit:   https://bit.ly/ym76232.  Prostate cancer screening test: If you have a prostate, ask your care team if a prostate cancer screening test (PSA) at age 55 is right for you.  Lung cancer screening: If you are a current or former smoker ages 50 to 80, ask your care team if ongoing lung cancer screenings are right for you.  For informational purposes only. Not to replace the advice of your health care provider. Copyright   2023 [facility]. All rights reserved. Clinically reviewed by the [facility] Transitions Program. [employer] 611049 - REV 01/24.  Learning About Stress  What is stress?     Stress is your body's response to a hard situation. Your body can have a physical, emotional, or mental response. Stress is a fact of life for most people, and it  affects everyone differently. What causes stress for you may not be stressful for someone else.  A lot of things can cause stress. You may feel stress when you go on a job interview, take a test, or run a race. This kind of short-term stress is normal and even useful. It can help you if you need to work hard or react quickly. For example, stress can help you finish an important job on time.  Long-term stress is caused by ongoing stressful situations or events. Examples of long-term stress include long-term health problems, ongoing problems at work, or conflicts in your family. Long-term stress can harm your health.  How does stress affect your health?  When you are stressed, your body responds as though you are in danger. It makes hormones that speed up your heart, make you breathe faster, and give you a burst of energy. This is called the fight-or-flight stress response. If the stress is over quickly, your body goes back to normal and no harm is done.  But if stress happens too often or lasts too long, it can have bad effects. Long-term stress can make you more likely to get sick, and it can make symptoms of some diseases worse. If you tense up when you are stressed, you may develop neck, shoulder, or low back pain. Stress is linked to high blood pressure and heart disease.  Stress also harms your emotional health. It can make you galindo, tense, or depressed. Your relationships may suffer, and you may not do well at work or school.  What can you do to manage stress?  You can try these things to help manage stress:   Do something active. Exercise or activity can help reduce stress. Walking is a great way to get started. Even everyday activities such as housecleaning or yard work can help.  Try yoga or carin chi. These techniques combine exercise and meditation. You may need some training at first to learn them.  Do something you enjoy. For example, listen to music or go to a movie. Practice your hobby or do volunteer  "work.  Meditate. This can help you relax, because you are not worrying about what happened before or what may happen in the future.  Do guided imagery. Imagine yourself in any setting that helps you feel calm. You can use online videos, books, or a teacher to guide you.  Do breathing exercises. For example:  From a standing position, bend forward from the waist with your knees slightly bent. Let your arms dangle close to the floor.  Breathe in slowly and deeply as you return to a standing position. Roll up slowly and lift your head last.  Hold your breath for just a few seconds in the standing position.  Breathe out slowly and bend forward from the waist.  Let your feelings out. Talk, laugh, cry, and express anger when you need to. Talking with supportive friends or family, a counselor, or a olga lidia leader about your feelings is a healthy way to relieve stress. Avoid discussing your feelings with people who make you feel worse.  Write. It may help to write about things that are bothering you. This helps you find out how much stress you feel and what is causing it. When you know this, you can find better ways to cope.  What can you do to prevent stress?  You might try some of these things to help prevent stress:  Manage your time. This helps you find time to do the things you want and need to do.  Get enough sleep. Your body recovers from the stresses of the day while you are sleeping.  Get support. Your family, friends, and community can make a difference in how you experience stress.  Limit your news feed. Avoid or limit time on social media or news that may make you feel stressed.  Do something active. Exercise or activity can help reduce stress. Walking is a great way to get started.  Where can you learn more?  Go to https://www.[employer].net/patiented  Enter N032 in the search box to learn more about \"Learning About Stress.\"  Current as of: October 24, 2023  Content Version: 14.3    2024 [employer]. "   Care instructions adapted under license by your healthcare professional. If you have questions about a medical condition or this instruction, always ask your healthcare professional. [employer] disclaims any warranty or liability for your use of this information.    Learning About Depression Screening  What is depression screening?  Depression screening is a way to see if you have depression symptoms. It may be done by a doctor or counselor. It's often part of a routine checkup. That's because your mental health is just as important as your physical health.  Depression is a mental health condition that affects how you feel, think, and act. You may:  Have less energy.  Lose interest in your daily activities.  Feel sad and grouchy for a long time.  Depression is very common. It affects people of all ages.  Many things can lead to depression. Some people become depressed after they have a stroke or find out they have a major illness like cancer or heart disease. The death of a loved one or a breakup may lead to depression. It can run in families. Most experts believe that a combination of inherited genes and stressful life events can cause it.  What happens during screening?  You may be asked to fill out a form about your depression symptoms. You and the doctor will discuss your answers. The doctor may ask you more questions to learn more about how you think, act, and feel.  What happens after screening?  If you have symptoms of depression, your doctor will talk to you about your options.  Doctors usually treat depression with medicines or counseling. Often, combining the two works best. Many people don't get help because they think that they'll get over the depression on their own. But people with depression may not get better unless they get treatment.  The cause of depression is not well understood. There may be many factors involved. But if you have depression, it's not your fault.  A serious symptom  "of depression is thinking about death or suicide. If you or someone you care about talks about this or about feeling hopeless, get help right away.  It's important to know that depression can be treated. Medicine, counseling, and self-care may help.  Where can you learn more?  Go to https://www.[employer].net/patiented  Enter T185 in the search box to learn more about \"Learning About Depression Screening.\"  Current as of: July 31, 2024  Content Version: 14.3    2024 [employer].   Care instructions adapted under license by your healthcare professional. If you have questions about a medical condition or this instruction, always ask your healthcare professional. [employer] disclaims any warranty or liability for your use of this information.       "

## 2025-02-25 NOTE — PROGRESS NOTES
Preventive Care Visit  [facility]  Soni Charles DNP, Family Medicine  Feb 25, 2025      Assessment & Plan     Routine general medical examination at a health care facility  Routine physical.     Lipid screening  Screening. She is not fasting today.     - Lipid panel reflex to direct LDL Non-fasting; Future    Screening for thyroid disorder  Screening.     - TSH with free T4 reflex; Future    Encounter for vitamin deficiency screening  Screening.     - Vitamin D Deficiency; Future    Screening for diabetes mellitus  Screening.     - Basic metabolic panel  (Ca, Cl, CO2, Creat, Gluc, K, Na, BUN); Future    Patient has been advised of split billing requirements and indicates understanding: Yes, reviewed by MA        Counseling  Appropriate preventive services were addressed with this patient via screening, questionnaire, or discussion as appropriate for fall prevention, nutrition, physical activity, Tobacco-use cessation, social engagement, weight loss and cognition.  Checklist reviewing preventive services available has been given to the patient.  Reviewed patient's diet, addressing concerns and/or questions.   She is at risk for lack of exercise and has been provided with information to increase physical activity for the benefit of her well-being.   She is at risk for psychosocial distress and has been provided with information to reduce risk.   The patient's PHQ-9 score is consistent with mild depression. She was provided with information regarding depression.       See Patient Instructions    Robb Sanabria is a 49 year old, presenting for the following:  Physical        2/25/2025     9:25 AM   Additional Questions   Roomed by [facility]   Accompanied by none         2/25/2025     9:25 AM   Patient Reported Additional Medications   Patient reports taking the following new medications none          HPI        Additional provider notes: Patient presents for the following:     Annual  prevent. No concerns today.    Perimenopause: hot flashes, wants more information on HRT. Recommended OBGYN.  Patient's last menstrual period was 02/04/2025 (approximate).    Exercise: walks dog regularly, rollerblading during summer      Allergies   Allergen Reactions    Seasonal Allergies        No current outpatient medications on file.     No current facility-administered medications for this visit.       Past Medical History:   Diagnosis Date    Actinic keratosis     Anxiety     controlled not on medications    Donor of kidney for transplant 2019    IBS (irritable bowel syndrome)         Health Care Directive  Patient does not have a Health Care Directive: Discussed advance care planning with patient; however, patient declined at this time.      2/20/2025   General Health   How would you rate your overall physical health? Excellent   Feel stress (tense, anxious, or unable to sleep) To some extent   (!) STRESS CONCERN      2/20/2025   Nutrition   Three or more servings of calcium each day? Yes   Diet: Regular (no restrictions)   How many servings of fruit and vegetables per day? (!) 2-3   How many sweetened beverages each day? 0-1         2/20/2025   Exercise   Days per week of moderate/strenous exercise 3 days   Average minutes spent exercising at this level 30 min         2/20/2025   Social Factors   Frequency of gathering with friends or relatives Once a week   Worry food won't last until get money to buy more No   Food not last or not have enough money for food? No   Do you have housing? (Housing is defined as stable permanent housing and does not include staying ouside in a car, in a tent, in an abandoned building, in an overnight shelter, or couch-surfing.) Yes   Are you worried about losing your housing? No   Lack of transportation? No   Unable to get utilities (heat,electricity)? No         2/20/2025   Dental   Dentist two times every year? Yes          Today's PHQ-9 Score:       2/25/2025     9:18 AM    PHQ-9 SCORE   PHQ-9 Total Score MyChart 5 (Mild depression)   PHQ-9 Total Score 5        Patient-reported         2/20/2025   Substance Use   Alcohol more than 3/day or more than 7/wk No   Do you use any other substances recreationally? No     Social History     Tobacco Use    Smoking status: Never     Passive exposure: Past    Smokeless tobacco: Never   Vaping Use    Vaping status: Never Used   Substance Use Topics    Alcohol use: Yes     Comment: 3/week    Drug use: No           2/26/2024   LAST FHS-7 RESULTS   1st degree relative breast or ovarian cancer Yes   Any relative bilateral breast cancer No   Any male have breast cancer No   Any ONE woman have BOTH breast AND ovarian cancer No   Any woman with breast cancer before 50yrs No   2 or more relatives with breast AND/OR ovarian cancer No   2 or more relatives with breast AND/OR bowel cancer No        Mammogram Screening - Mammogram every 1-2 years updated in Health Maintenance based on mutual decision making        2/20/2025   STI Screening   New sexual partner(s) since last STI/HIV test? No     History of abnormal Pap smear: No - age 30- 64 PAP with HPV every 5 years recommended        Latest Ref Rng & Units 1/4/2023     3:43 PM 5/4/2018    12:25 PM 5/4/2018     9:40 AM   PAP / HPV   PAP  Negative for Intraepithelial Lesion or Malignancy (NILM)      PAP (Historical)   NIL     HPV 16 DNA Negative Negative   Negative    HPV 18 DNA Negative Negative   Negative    Other HR HPV Negative Negative   Negative      ASCVD Risk   The 10-year ASCVD risk score (Todd STALLWORTH, et al., 2019) is: 0.4%    Values used to calculate the score:      Age: 49 years      Sex: Female      Is Non- : No      Diabetic: No      Tobacco smoker: No      Systolic Blood Pressure: 96 mmHg      Is BP treated: No      HDL Cholesterol: 65 mg/dL      Total Cholesterol: 173 mg/dL        2/20/2025   Contraception/Family Planning   Questions about contraception or  "family planning No        Reviewed and updated as needed this visit by Provider                    Past Medical History:   Diagnosis Date    Actinic keratosis     Anxiety     controlled not on medications    Donor of kidney for transplant     IBS (irritable bowel syndrome)      Past Surgical History:   Procedure Laterality Date    LAPAROSCOPIC DONOR HAND ASSISTED KIDNEY LIVING RELATED Right 2019    Procedure: Laparoscopic Hand Assisted Living Related Kidney Donor;  Surgeon: Cali Pérez MD;  Location: [facility] OR     OB History    Para Term  AB Living   2 2 2 0 0 2   SAB IAB Ectopic Multiple Live Births   0 0 0 0 0      # Outcome Date GA Lbr [place]/2nd Weight Sex Type Anes PTL Lv   2 Term            1 Term               Obstetric Comments   Normal delivery          Review of Systems  Constitutional, HEENT, cardiovascular, pulmonary, gi and gu systems are negative, except as otherwise noted.     Objective    Exam  BP 96/62 (BP Location: Right arm, Patient Position: Sitting, Cuff Size: Adult Regular)   Pulse 72   Temp 98.4  F (36.9  C) (Oral)   Resp 14   Ht 1.702 m (5' 7\")   Wt 60.4 kg (133 lb 3.2 oz)   LMP 2025 (Approximate)   SpO2 100%   Breastfeeding No   BMI 20.86 kg/m     Estimated body mass index is 20.86 kg/m  as calculated from the following:    Height as of this encounter: 1.702 m (5' 7\").    Weight as of this encounter: 60.4 kg (133 lb 3.2 oz).    Physical Exam  GENERAL: alert and no distress  EYES: Eyes grossly normal to inspection, PERRL and conjunctivae and sclerae normal  HENT: ear canals and TM's normal, nose and mouth without ulcers or lesions  NECK: no adenopathy, no asymmetry, masses, or scars  RESP: lungs clear to auscultation - no rales, rhonchi or wheezes  BREAST: normal without masses, tenderness or nipple discharge and no palpable axillary masses or adenopathy; dense tissue - does annual mammo  CV: regular rate and rhythm, normal S1 S2, no S3 or S4, no " murmur, click or rub, no peripheral edema  ABDOMEN: soft, nontender, no hepatosplenomegaly, no masses and bowel sounds normal  MS: no gross musculoskeletal defects noted, no edema  SKIN: no suspicious lesions or rashes  NEURO: Normal strength and tone, mentation intact and speech normal  PSYCH: mentation appears normal, affect normal/bright        Signed Electronically by: Soni Charles DNP

## 2025-02-26 LAB
ANION GAP SERPL CALCULATED.3IONS-SCNC: 10 MMOL/L (ref 7–15)
BUN SERPL-MCNC: 11.4 MG/DL (ref 6–20)
CALCIUM SERPL-MCNC: 9.1 MG/DL (ref 8.8–10.4)
CHLORIDE SERPL-SCNC: 103 MMOL/L (ref 98–107)
CHOLEST SERPL-MCNC: 219 MG/DL
CREAT SERPL-MCNC: 0.86 MG/DL (ref 0.51–0.95)
EGFRCR SERPLBLD CKD-EPI 2021: 82 ML/MIN/1.73M2
FASTING STATUS PATIENT QL REPORTED: NO
FASTING STATUS PATIENT QL REPORTED: NO
GLUCOSE SERPL-MCNC: 76 MG/DL (ref 70–99)
HCO3 SERPL-SCNC: 25 MMOL/L (ref 22–29)
HDLC SERPL-MCNC: 68 MG/DL
LDLC SERPL CALC-MCNC: 124 MG/DL
NONHDLC SERPL-MCNC: 151 MG/DL
POTASSIUM SERPL-SCNC: 4.4 MMOL/L (ref 3.4–5.3)
SODIUM SERPL-SCNC: 138 MMOL/L (ref 135–145)
T4 FREE SERPL-MCNC: 1.24 NG/DL (ref 0.9–1.7)
TRIGL SERPL-MCNC: 134 MG/DL
TSH SERPL DL<=0.005 MIU/L-ACNC: 4.65 UIU/ML (ref 0.3–4.2)
VIT D+METAB SERPL-MCNC: 18 NG/ML (ref 20–50)

## 2025-03-01 DIAGNOSIS — E55.9 VITAMIN D DEFICIENCY: Primary | ICD-10-CM

## 2025-03-01 RX ORDER — CHOLECALCIFEROL (VITAMIN D3) 50 MCG
1 TABLET ORAL DAILY
Qty: 90 TABLET | Refills: 0 | Status: SHIPPED | OUTPATIENT
Start: 2025-03-01

## 2025-04-09 ENCOUNTER — OFFICE VISIT (OUTPATIENT)
Dept: DERMATOLOGY | Facility: CLINIC | Age: 50
End: 2025-04-09
Payer: COMMERCIAL

## 2025-04-09 DIAGNOSIS — D18.01 ANGIOMA OF SKIN: ICD-10-CM

## 2025-04-09 DIAGNOSIS — L82.1 SEBORRHEIC KERATOSES: ICD-10-CM

## 2025-04-09 DIAGNOSIS — L82.0 INFLAMED SEBORRHEIC KERATOSIS: ICD-10-CM

## 2025-04-09 DIAGNOSIS — L81.4 LENTIGO: ICD-10-CM

## 2025-04-09 DIAGNOSIS — D23.9 DERMAL NEVUS: Primary | ICD-10-CM

## 2025-04-09 PROCEDURE — 17110 DESTRUCTION B9 LES UP TO 14: CPT | Performed by: DERMATOLOGY

## 2025-04-09 PROCEDURE — 99213 OFFICE O/P EST LOW 20 MIN: CPT | Mod: 25 | Performed by: DERMATOLOGY

## 2025-04-09 NOTE — PROGRESS NOTES
Elly Ballard is an extremely pleasant 49 year old year old female patient here today for spot on lft jawline.  Patient has no other skin complaints today.  Remainder of the HPI, Meds, PMH, Allergies, FH, and SH was reviewed in chart.      Past Medical History:   Diagnosis Date    Actinic keratosis     Anxiety     controlled not on medications    Donor of kidney for transplant 2019    IBS (irritable bowel syndrome)        Past Surgical History:   Procedure Laterality Date    LAPAROSCOPIC DONOR HAND ASSISTED KIDNEY LIVING RELATED Right 04/16/2019    Procedure: Laparoscopic Hand Assisted Living Related Kidney Donor;  Surgeon: Cali Pérez MD;  Location: [facility] OR        Family History   Problem Relation Age of Onset    Pulmonary Embolism Mother     Depression Mother     Anxiety Disorder Mother     Hypertension Mother     Macular Degeneration Father     Family History Negative Father     Chronic Kidney Disease Sister         anatomical     Other - See Comments Sister         corneal disorder    Mental Illness Sister         possible ASD; eating disorder    Osteoporosis Sister         hx of stress fracture    Diabetes Sister     Cancer Sister     Breast Cancer Sister 62    Osteopenia Sister     Family History Negative Brother     Substance Abuse Brother     Substance Abuse Brother     Other - See Comments Maternal Grandmother         TIA    Parkinsonism Maternal Grandfather     No Known Problems Paternal Grandmother     No Known Problems Paternal Grandfather     Other - See Comments Daughter     Autoimmune Disease Daughter         affects joints    Post-Traumatic Stress Disorder (PTSD) Daughter         b. 2001    Other - See Comments Son         b.2004 ASD; ADHD    Autoimmune Disease Son         POTS; EDS; chronic pain    Macular Degeneration Paternal Aunt     Glaucoma No family hx of        Social History     Socioeconomic History    Marital status:      Spouse name: Not on file    Number of children:  Not on file    Years of education: Not on file    Highest education level: Not on file   Occupational History     Comment:  for a non profit orgnization    Tobacco Use    Smoking status: Never     Passive exposure: Past    Smokeless tobacco: Never   Vaping Use    Vaping status: Never Used   Substance and Sexual Activity    Alcohol use: Yes     Comment: 3/week    Drug use: No    Sexual activity: Yes     Partners: Male   Other Topics Concern    Parent/sibling w/ CABG, MI or angioplasty before 65F 55M? Not Asked   Social History Narrative    Not on file     Social Drivers of Health     Financial Resource Strain: Low Risk  (2/20/2025)    Financial Resource Strain     Within the past 12 months, have you or your family members you live with been unable to get utilities (heat, electricity) when it was really needed?: No   Food Insecurity: Low Risk  (2/20/2025)    Food Insecurity     Within the past 12 months, did you worry that your food would run out before you got money to buy more?: No     Within the past 12 months, did the food you bought just not last and you didn t have money to get more?: No   Transportation Needs: Low Risk  (2/20/2025)    Transportation Needs     Within the past 12 months, has lack of transportation kept you from medical appointments, getting your medicines, non-medical meetings or appointments, work, or from getting things that you need?: No   Physical Activity: Insufficiently Active (2/20/2025)    Exercise Vital Sign     Days of Exercise per Week: 3 days     Minutes of Exercise per Session: 30 min   Stress: Stress Concern Present (2/20/2025)    [employer] - Occupational Stress Questionnaire     Feeling of Stress : To some extent   Social Connections: Unknown (2/20/2025)    Social Connection and Isolation Panel [NHANES]     Frequency of Communication with Friends and Family: Not on file     Frequency of Social Gatherings with Friends and Family: Once a  week     Attends Zoroastrian Services: Not on file     Active Member of Clubs or Organizations: Not on file     Attends Club or Organization Meetings: Not on file     Marital Status: Not on file   Interpersonal Safety: Low Risk  (2/25/2025)    Interpersonal Safety     Do you feel physically and emotionally safe where you currently live?: Yes     Within the past 12 months, have you been hit, slapped, kicked or otherwise physically hurt by someone?: No     Within the past 12 months, have you been humiliated or emotionally abused in other ways by your partner or ex-partner?: No   Housing Stability: Low Risk  (2/20/2025)    Housing Stability     Do you have housing? : Yes     Are you worried about losing your housing?: No       Outpatient Encounter Medications as of 4/9/2025   Medication Sig Dispense Refill    vitamin D3 (CHOLECALCIFEROL) 50 mcg (2000 units) tablet Take 1 tablet (50 mcg) by mouth daily. When this bottle gets low, follow-up for repeat labs. 90 tablet 0     No facility-administered encounter medications on file as of 4/9/2025.             O:   NAD, WDWN, Alert & Oriented, Mood & Affect wnl, Vitals stable   General appearance normal   Vitals stable   Alert, oriented and in no acute distress     L jawline inflamed seborrheic keratosis    Stuck on papules and brown macules on trunk and ext   Red papules on trunk  Flesh colored papules on trunk     The remainder of the full exam was normal; the following areas were examined:  conjunctiva/lids, , neck, peripheral vascular system, abdomen, lymph nodes, digits/nails, eccrine and apocrine glands, scalp/hair, face, neck, chest, abdomen, buttocks, back, RUE, LUE, RLE, LLE       Eyes: Conjunctivae/lids:Normal     ENT: Lips, mucosa: normal    MSK:Normal    Cardiovascular: peripheral edema none    Pulm: Breathing Normal    Lymph Nodes: No Head and Neck Lymphadenopathy     Neuro/Psych: Orientation:Alert and Orientedx3 ; Mood/Affect:normal       A/P:  1. Seborrheic  keratosis, lentigo, angioma, dermal nevus  2. L jawline inflamed seborrheic keratosis   LN2:  Treated with LN2 for 5s for 1-2 cycles. Warned risks of blistering, pain, pigment change, scarring, and incomplete resolution.  Advised patient to return if lesions do not completely resolve.  Wound care sheet given.  It was a pleasure speaking to Elly Ballard today.  Previous clinic notes and pertinent laboratory tests were reviewed prior to Elly Ballard's visit.  Signs and Symptoms of skin cancer discussed with patient.  Patient encouraged to perform monthly skin exams.  UV precautions reviewed with patient.  Return to clinic 12 months

## 2025-04-09 NOTE — LETTER
4/9/2025      Elly Ballard  [address] [address]      Dear Colleague,    Thank you for referring your patient, Elly Ballard, to the [facility]. Please see a copy of my visit note below.    Elly Ballard is an extremely pleasant 49 year old year old female patient here today for spot on lft jawline.  Patient has no other skin complaints today.  Remainder of the HPI, Meds, PMH, Allergies, FH, and SH was reviewed in chart.      Past Medical History:   Diagnosis Date     Actinic keratosis      Anxiety     controlled not on medications     Donor of kidney for transplant 2019     IBS (irritable bowel syndrome)        Past Surgical History:   Procedure Laterality Date     LAPAROSCOPIC DONOR HAND ASSISTED KIDNEY LIVING RELATED Right 04/16/2019    Procedure: Laparoscopic Hand Assisted Living Related Kidney Donor;  Surgeon: Cali Pérez MD;  Location:  OR        Family History   Problem Relation Age of Onset     Pulmonary Embolism Mother      Depression Mother      Anxiety Disorder Mother      Hypertension Mother      Macular Degeneration Father      Family History Negative Father      Chronic Kidney Disease Sister         anatomical      Other - See Comments Sister         corneal disorder     Mental Illness Sister         possible ASD; eating disorder     Osteoporosis Sister         hx of stress fracture     Diabetes Sister      Cancer Sister      Breast Cancer Sister 62     Osteopenia Sister      Family History Negative Brother      Substance Abuse Brother      Substance Abuse Brother      Other - See Comments Maternal Grandmother         TIA     Parkinsonism Maternal Grandfather      No Known Problems Paternal Grandmother      No Known Problems Paternal Grandfather      Other - See Comments Daughter      Autoimmune Disease Daughter         affects joints     Post-Traumatic Stress Disorder (PTSD) Daughter         b. 2001     Other - See Comments Son          b.2004 ASD; ADHD     Autoimmune Disease Son         POTS; EDS; chronic pain     Macular Degeneration Paternal Aunt      Glaucoma No family hx of        Social History     Socioeconomic History     Marital status:      Spouse name: Not on file     Number of children: Not on file     Years of education: Not on file     Highest education level: Not on file   Occupational History     Comment:  for a non profit orgnization    Tobacco Use     Smoking status: Never     Passive exposure: Past     Smokeless tobacco: Never   Vaping Use     Vaping status: Never Used   Substance and Sexual Activity     Alcohol use: Yes     Comment: 3/week     Drug use: No     Sexual activity: Yes     Partners: Male   Other Topics Concern     Parent/sibling w/ CABG, MI or angioplasty before 65F 55M? Not Asked   Social History Narrative     Not on file     Social Drivers of Health     Financial Resource Strain: Low Risk  (2/20/2025)    Financial Resource Strain      Within the past 12 months, have you or your family members you live with been unable to get utilities (heat, electricity) when it was really needed?: No   Food Insecurity: Low Risk  (2/20/2025)    Food Insecurity      Within the past 12 months, did you worry that your food would run out before you got money to buy more?: No      Within the past 12 months, did the food you bought just not last and you didn t have money to get more?: No   Transportation Needs: Low Risk  (2/20/2025)    Transportation Needs      Within the past 12 months, has lack of transportation kept you from medical appointments, getting your medicines, non-medical meetings or appointments, work, or from getting things that you need?: No   Physical Activity: Insufficiently Active (2/20/2025)    Exercise Vital Sign      Days of Exercise per Week: 3 days      Minutes of Exercise per Session: 30 min   Stress: Stress Concern Present (2/20/2025)    [employer] -  Occupational Stress Questionnaire      Feeling of Stress : To some extent   Social Connections: Unknown (2/20/2025)    Social Connection and Isolation Panel [NHANES]      Frequency of Communication with Friends and Family: Not on file      Frequency of Social Gatherings with Friends and Family: Once a week      Attends Hindu Services: Not on file      Active Member of Clubs or Organizations: Not on file      Attends Club or Organization Meetings: Not on file      Marital Status: Not on file   Interpersonal Safety: Low Risk  (2/25/2025)    Interpersonal Safety      Do you feel physically and emotionally safe where you currently live?: Yes      Within the past 12 months, have you been hit, slapped, kicked or otherwise physically hurt by someone?: No      Within the past 12 months, have you been humiliated or emotionally abused in other ways by your partner or ex-partner?: No   Housing Stability: Low Risk  (2/20/2025)    Housing Stability      Do you have housing? : Yes      Are you worried about losing your housing?: No       Outpatient Encounter Medications as of 4/9/2025   Medication Sig Dispense Refill     vitamin D3 (CHOLECALCIFEROL) 50 mcg (2000 units) tablet Take 1 tablet (50 mcg) by mouth daily. When this bottle gets low, follow-up for repeat labs. 90 tablet 0     No facility-administered encounter medications on file as of 4/9/2025.             O:   NAD, WDWN, Alert & Oriented, Mood & Affect wnl, Vitals stable   General appearance normal   Vitals stable   Alert, oriented and in no acute distress     L jawline inflamed seborrheic keratosis    Stuck on papules and brown macules on trunk and ext   Red papules on trunk  Flesh colored papules on trunk     The remainder of the full exam was normal; the following areas were examined:  conjunctiva/lids, , neck, peripheral vascular system, abdomen, lymph nodes, digits/nails, eccrine and apocrine glands, scalp/hair, face, neck, chest, abdomen, buttocks, back, RUE,  LUE, RLE, LLE       Eyes: Conjunctivae/lids:Normal     ENT: Lips, mucosa: normal    MSK:Normal    Cardiovascular: peripheral edema none    Pulm: Breathing Normal    Lymph Nodes: No Head and Neck Lymphadenopathy     Neuro/Psych: Orientation:Alert and Orientedx3 ; Mood/Affect:normal       A/P:  1. Seborrheic keratosis, lentigo, angioma, dermal nevus  2. L jawline inflamed seborrheic keratosis   LN2:  Treated with LN2 for 5s for 1-2 cycles. Warned risks of blistering, pain, pigment change, scarring, and incomplete resolution.  Advised patient to return if lesions do not completely resolve.  Wound care sheet given.  It was a pleasure speaking to Elly Ballard today.  Previous clinic notes and pertinent laboratory tests were reviewed prior to Elly Ballard's visit.  Signs and Symptoms of skin cancer discussed with patient.  Patient encouraged to perform monthly skin exams.  UV precautions reviewed with patient.  Return to clinic 12 months      Again, thank you for allowing me to participate in the care of your patient.        Sincerely,        Chucky Thakkar MD    Electronically signed

## 2025-05-22 DIAGNOSIS — E55.9 VITAMIN D DEFICIENCY: ICD-10-CM

## 2025-05-22 RX ORDER — CHOLECALCIFEROL (VITAMIN D3) 50 MCG
1 TABLET ORAL DAILY
Qty: 90 TABLET | Refills: 2 | Status: SHIPPED | OUTPATIENT
Start: 2025-05-22

## (undated) DEVICE — SUCTION MANIFOLD DORNOCH ULTRA CART UL-CL500

## (undated) DEVICE — ENDO SCOPE WARMER SEAL  C3101

## (undated) DEVICE — SURGICEL ABSORBABLE HEMOSTAT SNOW 4"X4" 2083

## (undated) DEVICE — LINEN TOWEL PACK X6 WHITE 5487

## (undated) DEVICE — ESU HARMONIC LAPAROSCOPIC SHEARS HD 1000I 36CM HARHD36

## (undated) DEVICE — DRAPE ISOLATION BAG 1003

## (undated) DEVICE — ANTIFOG SOLUTION W/FOAM PAD 31142527

## (undated) DEVICE — Device

## (undated) DEVICE — CATH TRAY FOLEY SURESTEP 16FR W/URNE MTR STLK LATEX A303316A

## (undated) DEVICE — TUBING INSUFFLATION W/FILTER CPC TO LUER 620-030-301

## (undated) DEVICE — SOL WATER IRRIG 1000ML BOTTLE 2F7114

## (undated) DEVICE — DRAPE SLUSH/WARMER 66X44" ORS-320

## (undated) DEVICE — SU VICRYL 3-0 SH 27" J316H

## (undated) DEVICE — ESU ENDO SCISSORS 5MM CVD 5DCS

## (undated) DEVICE — DRAPE MAYO STAND 23X54 8337

## (undated) DEVICE — WIPES FOLEY CARE SURESTEP PROVON DFC100

## (undated) DEVICE — SU MONOCRYL 4-0 PS-2 27" UND Y426H

## (undated) DEVICE — LINEN TOWEL PACK X5 5464

## (undated) DEVICE — STPL ENDO RELOAD TA 30X2.5MM 010911L

## (undated) DEVICE — ENDO TROCAR FIRST ENTRY KII FIOS Z-THRD 05X100MM CTF03

## (undated) DEVICE — ENDO TROCAR FIRST ENTRY KII FIOS Z-THRD 12X100MM CTF73

## (undated) DEVICE — SOL NACL 0.9% IRRIG 1000ML BOTTLE 2F7124

## (undated) DEVICE — GLOVE SENSICARE 7.0 MSG1070 LATEX FREE

## (undated) DEVICE — SOL NACL 0.9% INJ 1000ML BAG 2B1324X

## (undated) DEVICE — SU SILK 2-0 TIE 12X30" A305H

## (undated) DEVICE — CLIP ENDO HEMO-LOC PURPLE LG 544240

## (undated) DEVICE — SU SILK 3-0 TIE 12X30" A304H

## (undated) DEVICE — STPL ENDO TA30 2.5MM MULTIFIRE 010901

## (undated) DEVICE — ENDO TROCAR SLEEVE KII Z-THREADED 12X100MM CTS22

## (undated) DEVICE — SUCTION IRR STRYKERFLOW II W/TIP 250-070-520

## (undated) DEVICE — SU DERMABOND ADVANCED .7ML DNX12

## (undated) DEVICE — SU VICRYL 0 TIE 54" J608H

## (undated) DEVICE — LINEN GOWN XLG 5407

## (undated) DEVICE — BASIN SET SINGLE STERILE 13752-624

## (undated) DEVICE — TUBING IRRIG CYSTO/BLADDER SET 81" LF 2C4040

## (undated) DEVICE — PREP CHLORAPREP 26ML TINTED ORANGE  260815

## (undated) DEVICE — SU SILK 4-0 TIE 12X30" A303H

## (undated) DEVICE — BNDG ABDOMINAL BINDER 9X45-62" 79-89071

## (undated) DEVICE — SU PDS II 0 TP-1 60" Z991G

## (undated) RX ORDER — KETOROLAC TROMETHAMINE 30 MG/ML
INJECTION, SOLUTION INTRAMUSCULAR; INTRAVENOUS
Status: DISPENSED
Start: 2019-04-16

## (undated) RX ORDER — ONDANSETRON 2 MG/ML
INJECTION INTRAMUSCULAR; INTRAVENOUS
Status: DISPENSED
Start: 2019-04-16

## (undated) RX ORDER — DEXTROSE, SODIUM CHLORIDE, SODIUM LACTATE, POTASSIUM CHLORIDE, AND CALCIUM CHLORIDE 5; .6; .31; .03; .02 G/100ML; G/100ML; G/100ML; G/100ML; G/100ML
INJECTION, SOLUTION INTRAVENOUS
Status: DISPENSED
Start: 2019-04-16

## (undated) RX ORDER — SCOLOPAMINE TRANSDERMAL SYSTEM 1 MG/1
PATCH, EXTENDED RELEASE TRANSDERMAL
Status: DISPENSED
Start: 2019-04-16

## (undated) RX ORDER — PROPOFOL 10 MG/ML
INJECTION, EMULSION INTRAVENOUS
Status: DISPENSED
Start: 2019-04-16

## (undated) RX ORDER — FENTANYL CITRATE 50 UG/ML
INJECTION, SOLUTION INTRAMUSCULAR; INTRAVENOUS
Status: DISPENSED
Start: 2019-04-16

## (undated) RX ORDER — GLYCOPYRROLATE 0.2 MG/ML
INJECTION, SOLUTION INTRAMUSCULAR; INTRAVENOUS
Status: DISPENSED
Start: 2019-04-16

## (undated) RX ORDER — CEFUROXIME SODIUM 1.5 G/16ML
INJECTION, POWDER, FOR SOLUTION INTRAVENOUS
Status: DISPENSED
Start: 2019-04-16

## (undated) RX ORDER — CARDIOPLEG/ORGAN PRESERV NO.1 9-198-2-1
BOTTLE PERFUSION
Status: DISPENSED
Start: 2019-04-16

## (undated) RX ORDER — ACETAMINOPHEN 325 MG/1
TABLET ORAL
Status: DISPENSED
Start: 2019-04-16

## (undated) RX ORDER — HEPARIN SODIUM 5000 [USP'U]/.5ML
INJECTION, SOLUTION INTRAVENOUS; SUBCUTANEOUS
Status: DISPENSED
Start: 2019-04-16

## (undated) RX ORDER — DEXAMETHASONE SODIUM PHOSPHATE 4 MG/ML
INJECTION, SOLUTION INTRA-ARTICULAR; INTRALESIONAL; INTRAMUSCULAR; INTRAVENOUS; SOFT TISSUE
Status: DISPENSED
Start: 2019-04-16

## (undated) RX ORDER — GABAPENTIN 300 MG/1
CAPSULE ORAL
Status: DISPENSED
Start: 2019-04-16

## (undated) RX ORDER — HYDROMORPHONE HYDROCHLORIDE 1 MG/ML
INJECTION, SOLUTION INTRAMUSCULAR; INTRAVENOUS; SUBCUTANEOUS
Status: DISPENSED
Start: 2019-04-16

## (undated) RX ORDER — PROTAMINE SULFATE 10 MG/ML
INJECTION, SOLUTION INTRAVENOUS
Status: DISPENSED
Start: 2019-04-16

## (undated) RX ORDER — HYDROMORPHONE HCL/0.9% NACL/PF 0.2MG/0.2
SYRINGE (ML) INTRAVENOUS
Status: DISPENSED
Start: 2019-04-16

## (undated) RX ORDER — FUROSEMIDE 10 MG/ML
INJECTION INTRAMUSCULAR; INTRAVENOUS
Status: DISPENSED
Start: 2019-04-16

## (undated) RX ORDER — HEPARIN SODIUM 1000 [USP'U]/ML
INJECTION, SOLUTION INTRAVENOUS; SUBCUTANEOUS
Status: DISPENSED
Start: 2019-04-16

## (undated) RX ORDER — LIDOCAINE HYDROCHLORIDE 20 MG/ML
INJECTION, SOLUTION EPIDURAL; INFILTRATION; INTRACAUDAL; PERINEURAL
Status: DISPENSED
Start: 2019-04-16